# Patient Record
Sex: MALE | Race: WHITE | NOT HISPANIC OR LATINO | URBAN - METROPOLITAN AREA
[De-identification: names, ages, dates, MRNs, and addresses within clinical notes are randomized per-mention and may not be internally consistent; named-entity substitution may affect disease eponyms.]

---

## 2022-02-02 ENCOUNTER — INPATIENT (INPATIENT)
Facility: HOSPITAL | Age: 77
LOS: 15 days | Discharge: NURSING, REHAB OR LONG-TERM CARE | End: 2022-02-18
Attending: HOSPITALIST | Admitting: HOSPITALIST
Payer: MEDICARE

## 2022-02-02 VITALS — HEIGHT: 69 IN

## 2022-02-02 DIAGNOSIS — K92.2 GASTROINTESTINAL HEMORRHAGE, UNSPECIFIED: ICD-10-CM

## 2022-02-02 LAB
ALBUMIN SERPL ELPH-MCNC: 2.7 G/DL — LOW (ref 3.3–5)
ALP SERPL-CCNC: 54 U/L — SIGNIFICANT CHANGE UP (ref 40–120)
ALT FLD-CCNC: 10 U/L — SIGNIFICANT CHANGE UP (ref 4–41)
ANION GAP SERPL CALC-SCNC: 22 MMOL/L — HIGH (ref 7–14)
APTT BLD: 28.6 SEC — SIGNIFICANT CHANGE UP (ref 27–36.3)
AST SERPL-CCNC: 58 U/L — HIGH (ref 4–40)
BASOPHILS # BLD AUTO: 0.02 K/UL — SIGNIFICANT CHANGE UP (ref 0–0.2)
BASOPHILS NFR BLD AUTO: 0.1 % — SIGNIFICANT CHANGE UP (ref 0–2)
BILIRUB SERPL-MCNC: 0.5 MG/DL — SIGNIFICANT CHANGE UP (ref 0.2–1.2)
BLOOD GAS VENOUS COMPREHENSIVE RESULT: SIGNIFICANT CHANGE UP
BUN SERPL-MCNC: 165 MG/DL — HIGH (ref 7–23)
CALCIUM SERPL-MCNC: 8.6 MG/DL — SIGNIFICANT CHANGE UP (ref 8.4–10.5)
CHLORIDE SERPL-SCNC: 91 MMOL/L — LOW (ref 98–107)
CO2 SERPL-SCNC: 15 MMOL/L — LOW (ref 22–31)
CREAT SERPL-MCNC: 3.25 MG/DL — HIGH (ref 0.5–1.3)
EOSINOPHIL # BLD AUTO: 0.02 K/UL — SIGNIFICANT CHANGE UP (ref 0–0.5)
EOSINOPHIL NFR BLD AUTO: 0.1 % — SIGNIFICANT CHANGE UP (ref 0–6)
GLUCOSE BLDC GLUCOMTR-MCNC: 114 MG/DL — HIGH (ref 70–99)
GLUCOSE SERPL-MCNC: 101 MG/DL — HIGH (ref 70–99)
HCT VFR BLD CALC: 16.4 % — CRITICAL LOW (ref 39–50)
HCT VFR BLD CALC: 25.5 % — LOW (ref 39–50)
HGB BLD-MCNC: 5.6 G/DL — CRITICAL LOW (ref 13–17)
HGB BLD-MCNC: 9.1 G/DL — LOW (ref 13–17)
IANC: 9.78 K/UL — HIGH (ref 1.5–8.5)
IMM GRANULOCYTES NFR BLD AUTO: 1.5 % — SIGNIFICANT CHANGE UP (ref 0–1.5)
INR BLD: 1.05 RATIO — SIGNIFICANT CHANGE UP (ref 0.88–1.16)
LYMPHOCYTES # BLD AUTO: 19.1 % — SIGNIFICANT CHANGE UP (ref 13–44)
LYMPHOCYTES # BLD AUTO: 2.62 K/UL — SIGNIFICANT CHANGE UP (ref 1–3.3)
MCHC RBC-ENTMCNC: 29.5 PG — SIGNIFICANT CHANGE UP (ref 27–34)
MCHC RBC-ENTMCNC: 30.1 PG — SIGNIFICANT CHANGE UP (ref 27–34)
MCHC RBC-ENTMCNC: 34.1 GM/DL — SIGNIFICANT CHANGE UP (ref 32–36)
MCHC RBC-ENTMCNC: 35.7 GM/DL — SIGNIFICANT CHANGE UP (ref 32–36)
MCV RBC AUTO: 84.4 FL — SIGNIFICANT CHANGE UP (ref 80–100)
MCV RBC AUTO: 86.3 FL — SIGNIFICANT CHANGE UP (ref 80–100)
MONOCYTES # BLD AUTO: 1.07 K/UL — HIGH (ref 0–0.9)
MONOCYTES NFR BLD AUTO: 7.8 % — SIGNIFICANT CHANGE UP (ref 2–14)
NEUTROPHILS # BLD AUTO: 9.78 K/UL — HIGH (ref 1.8–7.4)
NEUTROPHILS NFR BLD AUTO: 71.4 % — SIGNIFICANT CHANGE UP (ref 43–77)
NRBC # BLD: 0 /100 WBCS — SIGNIFICANT CHANGE UP
NRBC # BLD: 0 /100 WBCS — SIGNIFICANT CHANGE UP
NRBC # FLD: 0 K/UL — SIGNIFICANT CHANGE UP
NRBC # FLD: 0 K/UL — SIGNIFICANT CHANGE UP
PLATELET # BLD AUTO: 101 K/UL — LOW (ref 150–400)
PLATELET # BLD AUTO: 123 K/UL — LOW (ref 150–400)
POTASSIUM SERPL-MCNC: 4.8 MMOL/L — SIGNIFICANT CHANGE UP (ref 3.5–5.3)
POTASSIUM SERPL-SCNC: 4.8 MMOL/L — SIGNIFICANT CHANGE UP (ref 3.5–5.3)
PROT SERPL-MCNC: 4.9 G/DL — LOW (ref 6–8.3)
PROTHROM AB SERPL-ACNC: 12 SEC — SIGNIFICANT CHANGE UP (ref 10.6–13.6)
RBC # BLD: 1.9 M/UL — LOW (ref 4.2–5.8)
RBC # BLD: 3.02 M/UL — LOW (ref 4.2–5.8)
RBC # FLD: 13.8 % — SIGNIFICANT CHANGE UP (ref 10.3–14.5)
RBC # FLD: 14.7 % — HIGH (ref 10.3–14.5)
SARS-COV-2 RNA SPEC QL NAA+PROBE: SIGNIFICANT CHANGE UP
SODIUM SERPL-SCNC: 128 MMOL/L — LOW (ref 135–145)
WBC # BLD: 13.72 K/UL — HIGH (ref 3.8–10.5)
WBC # BLD: 13.92 K/UL — HIGH (ref 3.8–10.5)
WBC # FLD AUTO: 13.72 K/UL — HIGH (ref 3.8–10.5)
WBC # FLD AUTO: 13.92 K/UL — HIGH (ref 3.8–10.5)

## 2022-02-02 PROCEDURE — 99291 CRITICAL CARE FIRST HOUR: CPT

## 2022-02-02 RX ORDER — BUDESONIDE AND FORMOTEROL FUMARATE DIHYDRATE 160; 4.5 UG/1; UG/1
2 AEROSOL RESPIRATORY (INHALATION)
Qty: 0 | Refills: 0 | DISCHARGE

## 2022-02-02 RX ORDER — ATORVASTATIN CALCIUM 80 MG/1
20 TABLET, FILM COATED ORAL ONCE
Refills: 0 | Status: COMPLETED | OUTPATIENT
Start: 2022-02-02 | End: 2022-02-02

## 2022-02-02 RX ORDER — INSULIN LISPRO 100/ML
VIAL (ML) SUBCUTANEOUS
Refills: 0 | Status: DISCONTINUED | OUTPATIENT
Start: 2022-02-02 | End: 2022-02-04

## 2022-02-02 RX ORDER — DEXTROSE 50 % IN WATER 50 %
25 SYRINGE (ML) INTRAVENOUS ONCE
Refills: 0 | Status: DISCONTINUED | OUTPATIENT
Start: 2022-02-02 | End: 2022-02-18

## 2022-02-02 RX ORDER — CEFTRIAXONE 500 MG/1
1000 INJECTION, POWDER, FOR SOLUTION INTRAMUSCULAR; INTRAVENOUS ONCE
Refills: 0 | Status: COMPLETED | OUTPATIENT
Start: 2022-02-02 | End: 2022-02-02

## 2022-02-02 RX ORDER — SENNA PLUS 8.6 MG/1
2 TABLET ORAL
Qty: 0 | Refills: 0 | DISCHARGE

## 2022-02-02 RX ORDER — OMEPRAZOLE 10 MG/1
1 CAPSULE, DELAYED RELEASE ORAL
Qty: 0 | Refills: 0 | DISCHARGE

## 2022-02-02 RX ORDER — ATORVASTATIN CALCIUM 80 MG/1
20 TABLET, FILM COATED ORAL AT BEDTIME
Refills: 0 | Status: DISCONTINUED | OUTPATIENT
Start: 2022-02-03 | End: 2022-02-03

## 2022-02-02 RX ORDER — CEFTRIAXONE 500 MG/1
1000 INJECTION, POWDER, FOR SOLUTION INTRAMUSCULAR; INTRAVENOUS EVERY 24 HOURS
Refills: 0 | Status: DISCONTINUED | OUTPATIENT
Start: 2022-02-03 | End: 2022-02-03

## 2022-02-02 RX ORDER — SODIUM CHLORIDE 9 MG/ML
1000 INJECTION, SOLUTION INTRAVENOUS
Refills: 0 | Status: DISCONTINUED | OUTPATIENT
Start: 2022-02-02 | End: 2022-02-18

## 2022-02-02 RX ORDER — PANTOPRAZOLE SODIUM 20 MG/1
80 TABLET, DELAYED RELEASE ORAL ONCE
Refills: 0 | Status: COMPLETED | OUTPATIENT
Start: 2022-02-02 | End: 2022-02-02

## 2022-02-02 RX ORDER — DEXTROSE 50 % IN WATER 50 %
15 SYRINGE (ML) INTRAVENOUS ONCE
Refills: 0 | Status: DISCONTINUED | OUTPATIENT
Start: 2022-02-02 | End: 2022-02-18

## 2022-02-02 RX ORDER — NOREPINEPHRINE BITARTRATE/D5W 8 MG/250ML
0.05 PLASTIC BAG, INJECTION (ML) INTRAVENOUS
Qty: 8 | Refills: 0 | Status: DISCONTINUED | OUTPATIENT
Start: 2022-02-02 | End: 2022-02-06

## 2022-02-02 RX ORDER — CHOLECALCIFEROL (VITAMIN D3) 125 MCG
1 CAPSULE ORAL
Qty: 0 | Refills: 0 | DISCHARGE

## 2022-02-02 RX ORDER — CEFTRIAXONE 500 MG/1
INJECTION, POWDER, FOR SOLUTION INTRAMUSCULAR; INTRAVENOUS
Refills: 0 | Status: DISCONTINUED | OUTPATIENT
Start: 2022-02-02 | End: 2022-02-03

## 2022-02-02 RX ORDER — CITALOPRAM 10 MG/1
1 TABLET, FILM COATED ORAL
Qty: 0 | Refills: 0 | DISCHARGE

## 2022-02-02 RX ORDER — GLUCAGON INJECTION, SOLUTION 0.5 MG/.1ML
1 INJECTION, SOLUTION SUBCUTANEOUS ONCE
Refills: 0 | Status: DISCONTINUED | OUTPATIENT
Start: 2022-02-02 | End: 2022-02-18

## 2022-02-02 RX ORDER — DESMOPRESSIN ACETATE 0.1 MG/1
30 TABLET ORAL ONCE
Refills: 0 | Status: COMPLETED | OUTPATIENT
Start: 2022-02-02 | End: 2022-02-02

## 2022-02-02 RX ORDER — INSULIN LISPRO 100/ML
VIAL (ML) SUBCUTANEOUS AT BEDTIME
Refills: 0 | Status: DISCONTINUED | OUTPATIENT
Start: 2022-02-02 | End: 2022-02-04

## 2022-02-02 RX ORDER — SODIUM CHLORIDE 9 MG/ML
500 INJECTION, SOLUTION INTRAVENOUS ONCE
Refills: 0 | Status: COMPLETED | OUTPATIENT
Start: 2022-02-02 | End: 2022-02-02

## 2022-02-02 RX ORDER — PANTOPRAZOLE SODIUM 20 MG/1
8 TABLET, DELAYED RELEASE ORAL
Qty: 80 | Refills: 0 | Status: DISCONTINUED | OUTPATIENT
Start: 2022-02-02 | End: 2022-02-04

## 2022-02-02 RX ORDER — SODIUM CHLORIDE 9 MG/ML
1000 INJECTION, SOLUTION INTRAVENOUS
Refills: 0 | Status: DISCONTINUED | OUTPATIENT
Start: 2022-02-02 | End: 2022-02-03

## 2022-02-02 RX ORDER — DEXTROSE 50 % IN WATER 50 %
12.5 SYRINGE (ML) INTRAVENOUS ONCE
Refills: 0 | Status: DISCONTINUED | OUTPATIENT
Start: 2022-02-02 | End: 2022-02-18

## 2022-02-02 RX ORDER — CHLORHEXIDINE GLUCONATE 213 G/1000ML
1 SOLUTION TOPICAL
Refills: 0 | Status: DISCONTINUED | OUTPATIENT
Start: 2022-02-02 | End: 2022-02-11

## 2022-02-02 RX ADMIN — PANTOPRAZOLE SODIUM 10 MG/HR: 20 TABLET, DELAYED RELEASE ORAL at 14:28

## 2022-02-02 RX ADMIN — PANTOPRAZOLE SODIUM 80 MILLIGRAM(S): 20 TABLET, DELAYED RELEASE ORAL at 14:28

## 2022-02-02 RX ADMIN — CEFTRIAXONE 100 MILLIGRAM(S): 500 INJECTION, POWDER, FOR SOLUTION INTRAMUSCULAR; INTRAVENOUS at 23:27

## 2022-02-02 RX ADMIN — DESMOPRESSIN ACETATE 230 MICROGRAM(S): 0.1 TABLET ORAL at 22:05

## 2022-02-02 RX ADMIN — Medication 8.68 MICROGRAM(S)/KG/MIN: at 22:41

## 2022-02-02 RX ADMIN — SODIUM CHLORIDE 1000 MILLILITER(S): 9 INJECTION, SOLUTION INTRAVENOUS at 22:35

## 2022-02-02 NOTE — ED PROVIDER NOTE - PHYSICAL EXAMINATION
General: Elderly male in NAD   HEENT: Normocephalic and atraumatic, conjunctival pallor. Trachea midline.   Cardiac: Normal S1 and S2 w/ RRR. No MRG.  Pulmonary: CTA bilaterally. No increased WOB.   Abdominal: Soft, NTND  Rectal: Dark, melanotic stool in vault.   Neurologic: AAOX1, follows commands.   Musculoskeletal: No limited ROM or deformities  Vascular: Warm and well perfused  Skin: Color appropriate   Psychiatric: Appropriate mood and affect. No apparent risk to self or others.  Sukhdev Del Castillo M.D.

## 2022-02-02 NOTE — H&P ADULT - NSHPLABSRESULTS_GEN_ALL_CORE
9.1    13.92 )-----------( 101      ( 02 Feb 2022 19:54 )             25.5     02-02    128<L>  |  91<L>  |  165<H>  ----------------------------<  101<H>  4.8   |  15<L>  |  3.25<H>    Ca    8.6      02 Feb 2022 14:35    TPro  4.9<L>  /  Alb  2.7<L>  /  TBili  0.5  /  DBili  x   /  AST  58<H>  /  ALT  10  /  AlkPhos  54  02-02      Blood Gas Profile - Venous  pH, Venous - 7.37  pCO2, Venous - 29  pO2, Venous - 32  HCO3, Venous - 17  Lactate, Venous - 4.8 Labs:                9.1    13.92 )-----------( 101      ( 02 Feb 2022 19:54 )             25.5                         8.9L    16.03H )-----------( 122L      ( 03 Feb 2022 02:54 )             25.7L   IANC 11.83H    PT/INR - ( 02 Feb 2022 14:35 )   PT: 12.0 sec;   INR: 1.05 ratio    PTT - ( 03 Feb 2022 02:54 )  PTT:25.2H sec    02-02  128<L>  |  91<L>  |  165<H>  ----------------------------<  101<H>  4.8   |  15<L>  |  3.25<H>  Ca    8.6      02 Feb 2022 14:35    02-03  128<L>  |  92<L>  |  146<H>   ----------------------------<  165<H>  4.5   |  14<L>  |  2.51<H>  Ca    8.3<L>      03 Feb 2022 02:54    TPro  5.4<L>  /  Alb  2.8<L>  /  TBili  2.1<H>  /  DBili  x   /  AST  66<H>  /  ALT  10  /  AlkPhos  65  02-03  TPro  4.9<L>  /  Alb  2.7<L>  /  TBili  0.5  /  DBili  x   /  AST  58<H>  /  ALT  10  /  AlkPhos  54  02-02    Blood Gas Profile - Venous  pH, Venous - 7.37  pCO2, Venous - 29  pO2, Venous - 32  HCO3, Venous - 17  Lactate, Venous - 4.8 Labs:                9.1    13.92 )-----------( 101      ( 2022 19:54 )             25.5                         8.9L    16.03H )-----------( 122L      ( 2022 02:54 )             25.7L   IANC 11.83H    PT/INR - ( 2022 14:35 )   PT: 12.0 sec;   INR: 1.05 ratio    PTT - ( 2022 02:54 )  PTT:25.2H sec    02  128<L>  |  91<L>  |  165<H>  ----------------------------<  101<H>  4.8   |  15<L>  |  3.25<H>  Ca    8.6      2022 14:35    02-  128<L>  |  92<L>  |  146<H>   ----------------------------<  165<H>  4.5   |  14<L>  |  2.51<H>  Ca    8.3<L>      2022 02:54    TPro  5.4<L>  /  Alb  2.8<L>  /  TBili  2.1<H>  /  DBili  x   /  AST  66<H>  /  ALT  10  /  AlkPhos  65  02-03  TPro  4.9<L>  /  Alb  2.7<L>  /  TBili  0.5  /  DBili  x   /  AST  58<H>  /  ALT  10  /  AlkPhos  54  02-02    Blood Gas Profile - Venous  pH, Venous - 7.37  pCO2, Venous - 29  pO2, Venous - 32  HCO3, Venous - 17  Lactate, Venous - 4.8    Urinalysis Basic - ( 2022 05:53 )  Color: Light Yellow / Appearance: Slightly Turbid / S.016 / pH: x  Gluc: x / Ketone: Small  / Bili: Negative / Urobili: <2 mg/dL   Blood: x / Protein: Negative / Nitrite: Negative   Leuk Esterase: Large / RBC: 0 /HPF / WBC 49 /HPF   Sq Epi: x / Non Sq Epi: Moderate / Bacteria: Few

## 2022-02-02 NOTE — H&P ADULT - NSHPREVIEWOFSYSTEMS_GEN_ALL_CORE
CONSTITUTIONAL: No weakness. No fatigue. No fever.  HEAD: No head trauma.   EYES: No vision changes.  ENT: No hearing changes or tinnitus. No ear pain. No changes in smell. No nasal congestion or discharge. No sore throat. No voice hoarseness.   NECK: No neck pain or stiffness. No lumps.  RESPIRATORY: No cough. No SOB. No wheezing. No hemoptysis.   CARDIOVASCULAR: No chest pain. No palpitations.   GASTROINTESTINAL: +ABD pain in RLQ. +Coffee ground emesis. No distension. No constipation. No diarrhea. No pain with defecation. No hematemesis. No hematochezia or melena.  BACK: No back pain.  GENITOURINARY: No dysuria. No frequency or urgency. No hesitancy. No incontinence. No urinary retention. No suprapubic pain. No hematuria.  EXTREMITY: No swelling.  MUSCULOSKELETAL: No joint pain or swelling. No fractures. No stiffness.    SKIN: No rashes. No itching. No skin, hair, or nail changes.  NEUROLOGICAL: No weakness or paralysis. No lightheadedness or dizziness. No HA. No numbness or tingling.   PSYCHIATRIC: No depression. CONSTITUTIONAL: No weakness. No fatigue. No fever.  HEAD: No head trauma.   EYES: No vision changes.  ENT: No hearing changes or tinnitus. No ear pain. No changes in smell. No nasal congestion or discharge. No sore throat. No voice hoarseness.   NECK: No neck pain or stiffness. No lumps.  RESPIRATORY: No cough. No SOB. No wheezing. No hemoptysis.   CARDIOVASCULAR: No chest pain. No palpitations.   GASTROINTESTINAL: +ABD pain in RLQ. +Coffee ground emesis. No distension. No constipation. No diarrhea. No pain with defecation. No hematemesis. No hematochezia or melena.  BACK: No back pain.  GENITOURINARY: No dysuria. No frequency or urgency. No hesitancy. No incontinence. No urinary retention. No suprapubic pain. No hematuria.  EXTREMITY: No swelling.  MUSCULOSKELETAL: No joint pain or swelling. No fractures. No stiffness.    SKIN: No rashes. No itching. No skin, hair, or nail changes.  NEUROLOGICAL: No weakness or paralysis. No lightheadedness or dizziness. No HA. No numbness or tingling.   PSYCHIATRIC: No SI.

## 2022-02-02 NOTE — ED PROVIDER NOTE - CLINICAL SUMMARY MEDICAL DECISION MAKING FREE TEXT BOX
76M presents with GIB, melanoitc stool in vault. Intermittently hypotensive. Given 2U PRBC urgent release. GI, MICU consulted.

## 2022-02-02 NOTE — ED PROVIDER NOTE - ATTENDING CONTRIBUTION TO CARE
DR. BLOCH, ATTENDING MD-  I performed a face to face bedside interview with patient regarding history of present illness, review of symptoms and past medical history. I completed an independent physical exam.  I have discussed patient's plan of care with the fellow.  Upon my evaluation, this patient had a high probability of imminent or life-threatening deterioration due to GI bleed, which required my direct attention, intervention, and personal management.  The patient has a  medical condition that impairs one or more vital organ systems.  Frequent personal assessment and adjustment of medical interventions was performed.      I have personally provided 45 minutes of critical care time exclusive of time spent on separately billable procedures. Time includes review of laboratory data, radiology results, discussion with consultants, patient and family; monitoring for potential decompensation, as well as time spent retrieving data and reviewing the chart and documenting the visit. Interventions were performed as documented above.  Patient mentating well. though hypotensive, oriented x1, pale, heart s1s2, lungs clear abd soft nontedner, extrem trace edema, moving all extremities

## 2022-02-02 NOTE — CONSULT NOTE ADULT - ASSESSMENT
76M with CHF (EF 27% per [employer]scripts in 2011), CAD s/p CABG 2010, CVA with hemiparesis, GERD, MDD, COPD presenting from NH with coffee ground emesis, encephalopathy and hypotension.    Impression:  #Coffee ground emesis - hgb 5.7 concerning for UGIB. Ddx includes stress ulcer, peptic ulcer disease, erosive gastritis, severe reflux esophagitis, MW tear, NSAID gastropathy, Dieulafoy's lesion, angioectasia. No known hx of liver disease.   #CHF - last documented EF in [employer]scripts 27%    Recommendations:  - Keep NPO  - Maintain active type & screen  - Transfuse to maintain Hb >/= 8.0 given hx of cardiac disease  - Pantoprazole 80 mg IV bolus then pantoprazole infusion at 8 mg/hr  - if BP remains low after resuscitation, low threshold for MICU consult  - repeat lactate   - TTE to assess cardiac function  - plan for EGD after resuscitation likely in AM or sooner if needed in ICU setting      Cassandra Trejo PGY-5  Gastroenterology Fellow  Pager #[phone] ([facility]) or [phone] (NS)  Available on [employer] Teams.  Please contact on-call GI fellow via answering service ([phone]) after 5pm and before 8am, and on weekends.

## 2022-02-02 NOTE — H&P ADULT - HISTORY OF PRESENT ILLNESS
76M PMHx CHF (TTE 8/2011 demonstrating moderate segmental LV systolic dysfunction), CAD, HTN, T2DM presents from [facility] with abdominal pain and coffee ground emesis. Patient states that he started experiencing acute onset of R lower quadrant abdominal pain x 1 week, described as a 7/10 intermittent, sharp, non-radiating pain that is made better with consumption of liquid diet. He also endorses 1-2 episodes of pale green and clear emesis daily within this 1-week time frame. Note that per documentation accompanying patient from NH, patient was noted to have coffee ground emesis and altered mentation.    In the ED:  VS: BP /47-66, HR 89-91, RR 17-26, O2 Sat  on 2L, Tmax 97.9  Labs: CBC - WBC 13.72, Hgb 5.6 (baseline is 17.3 in 2015), platelets 123. CMP - Na 128, /3.25 (baseline 1.69 9/2011); AST/ALT 58/10, otherwise normal. VBG 7.37/29/32/17/lactate 4.8.  Imaging: None acquired.    Patient given: Pantoprazole 80, pRBC 2U, ordered for pantoprazole drip and given one additional unit of pRBC. Patient was noted to be intermittently hypotensive despite fluid resuscitation, was admitted to MICU. 76M PMHx CHF (TTE 8/2011 demonstrating moderate segmental LV systolic dysfunction), CAD, COPD, HTN, T2DM presents from [facility] with abdominal pain and coffee ground emesis. Patient states that he started experiencing acute onset of R lower quadrant abdominal pain x 1 week, described as a 7/10 intermittent, sharp, non-radiating pain that is made better with consumption of liquid diet. He also endorses 1-2 episodes of pale green and clear emesis daily within this 1-week time frame. Note that per documentation accompanying patient from NH, patient was being transferred for evaluation of GI bleed and hypotension.    In the ED:  VS: BP /47-66, HR 89-91, RR 17-26, O2 Sat  on 2L, Tmax 97.9  Labs: CBC - WBC 13.72, Hgb 5.6 (baseline is 17.3 in 2015), platelets 123. CMP - Na 128, /3.25 (baseline 1.69 9/2011); AST/ALT 58/10, otherwise normal. VBG 7.37/29/32/17/lactate 4.8.  Imaging: None acquired.    Patient given: Pantoprazole 80, pRBC 2U, ordered for pantoprazole drip and given one additional unit of pRBC. Patient was noted to be intermittently hypotensive despite fluid resuscitation, was admitted to MICU. 76M PMHx CHF (TTE 8/2011 demonstrating moderate segmental LV systolic dysfunction), CAD, COPD, HTN, T2DM presents from [facility] with abdominal pain and coffee ground emesis. Patient states that he started experiencing acute onset of R lower quadrant abdominal pain x 1 week, described as a 7/10 intermittent, sharp, non-radiating pain that is made better with consumption of liquid diet. He also endorses 1-2 episodes of pale green and clear emesis daily within this 1-week time frame. Note that per documentation accompanying patient from NH, patient was being transferred for evaluation of GI bleed and hypotension.    In the ED:  VS: BP /47-66, HR 89-91, RR 17-26, O2 Sat  on 2L, Tmax 97.9  Labs: CBC - WBC 13.72, Hgb 5.6 (baseline is 17.3 in 2015), platelets 123. CMP - Na 128, /3.25 (baseline 1.69 9/2011); AST/ALT 58/10, otherwise normal. VBG 7.37/29/32/17/lactate 4.8.    Patient given: Pantoprazole 80, pRBC 2U, ordered for pantoprazole drip and given one additional unit of pRBC. Patient was noted to be intermittently hypotensive despite fluid resuscitation, was admitted to MICU.

## 2022-02-02 NOTE — ED PROVIDER NOTE - OBJECTIVE STATEMENT
76M PMH CHF, CAD, HTN, DM presents with GIB. As per report from NH, pt had coffee ground emesis last night. This afternoon was noted to be hypotensive so EMS was called. EMS reports pt was hypotensive to 70s in the field. Pt only c/o nausea. unclear what mental baseline is. Does not appear to be on thinners. 76M PMH CHF, CAD, HTN, DM presents with GIB. As per report from NH, pt had coffee ground emesis last night. This afternoon was noted to be hypotensive so EMS was called. EMS reports pt was hypotensive to 70s in the field. Pt only c/o nausea. unclear what mental baseline is. Does not appear to be on blood thinners.

## 2022-02-02 NOTE — CONSULT NOTE ADULT - SUBJECTIVE AND OBJECTIVE BOX
Chief Complaint:  Patient is a 76y old  Male who presents with a chief complaint of     HPI: History mostly obtained from NH paperwork and chart review.    76M with CHF (EF 27% per [employer]scripts in 2011), CAD s/p CABG 2010, CVA with hemiparesis, GERD, MDD, COPD presenting from NH with coffee ground emesis last night along with AMS. Per documentation, patient normally AOx2-3 however now confused and slow to respond. Noted to be hypotensive by EMS to 70s systolic. Patient states he has discomfort in his back currently. Able to state he is at [facility] and here for a stomach problem. Denies history of stomach problems, n/v, dizziness.      Allergies:  No Known Allergies      Home Medications:    Hospital Medications:  pantoprazole Infusion 8 mG/Hr IV Continuous <Continuous>      PMHX/PSHX:  Cigarette Smoker    Essential Hypertension    CHF (Congestive Heart Failure)    Diabetes Mellitus    Coronary Artery Disease (CAD) Excluded    CAD (Coronary Atherosclerotic Disease)    Hypercholesteremia    CABG (Coronary Artery Bypass Graft)    History of Appendectomy    CABG (Coronary Artery Bypass Graft)        Family history:      Social History:     ROS:     General:  No weight loss, fevers, chills, night sweats, fatigue  Eyes:  No vision changes, no yellowing of eyes   ENT:  No throat pain, runny nose  CV:  No chest pain, palpitations  Resp:  No SOB, cough, wheezing  GI:  See HPI  :  No burning with urination, no hematuria   Muscle:  No muscle pain, weakness  Neuro:  No numbness/tingling, memory problems  Psych:  No fatigue, insomnia, mood problems  Heme:  No easy bruisability  Skin:  No rash, itching       PHYSICAL EXAM:     GENERAL: pale, no distress lying in stretcher  HEENT:  NC/AT,  conjunctivae clear and pink, dried brown material on mouth  CHEST:  Full & symmetric excursion, no increased effort  HEART:  Regular rate  ABDOMEN:  Soft, non-tender, non-distended  RECTAL: dark brown stool  EXTREMITIES:  no cyanosis, clubbing or edema  SKIN:  No rash/erythema/ecchymoses/petechiae/wounds/abscess/warm/dry  NEURO:  Alert, oriented to place    Vital Signs:  Vital Signs Last 24 Hrs  T(C): 36.6 (02 Feb 2022 15:13), Max: 36.6 (02 Feb 2022 14:38)  T(F): 97.9 (02 Feb 2022 15:13), Max: 97.9 (02 Feb 2022 14:38)  HR: 90 (02 Feb 2022 15:13) (89 - 91)  BP: 106/53 (02 Feb 2022 15:13) (83/47 - 106/53)  BP(mean): 72 (02 Feb 2022 15:00) (72 - 72)  RR: 18 (02 Feb 2022 15:13) (17 - 26)  SpO2: 100% (02 Feb 2022 15:13) (99% - 100%)  Daily Height in cm: 175.26 (02 Feb 2022 13:42)    Daily     LABS:                        5.6    13.72 )-----------( 123      ( 02 Feb 2022 14:35 )             16.4             PT/INR - ( 02 Feb 2022 14:35 )   PT: 12.0 sec;   INR: 1.05 ratio         PTT - ( 02 Feb 2022 14:35 )  PTT:28.6 sec        Imaging:           HPI: History mostly obtained from NH paperwork and chart review.    76M with CHF (EF 27% per [employer]scripts in 2011), CAD s/p CABG 2010, CVA with hemiparesis, GERD, MDD, COPD presenting from NH with coffee ground emesis last night along with AMS. Per documentation, patient normally AOx2-3 however now confused and slow to respond. Noted to be hypotensive by EMS to 70s systolic. Patient states he has discomfort in his back currently. Able to state he is at [facility] and here for a stomach problem. Denies history of stomach problems, n/v, dizziness.      Allergies:  No Known Allergies      Home Medications:    Hospital Medications:  pantoprazole Infusion 8 mG/Hr IV Continuous <Continuous>      PMHX/PSHX:  Cigarette Smoker    Essential Hypertension    CHF (Congestive Heart Failure)    Diabetes Mellitus    Coronary Artery Disease (CAD) Excluded    CAD (Coronary Atherosclerotic Disease)    Hypercholesteremia    CABG (Coronary Artery Bypass Graft)    History of Appendectomy    CABG (Coronary Artery Bypass Graft)        Family history:      Social History:     ROS:     General:  No weight loss, fevers, chills, night sweats, fatigue  Eyes:  No vision changes, no yellowing of eyes   ENT:  No throat pain, runny nose  CV:  No chest pain, palpitations  Resp:  No SOB, cough, wheezing  GI:  See HPI  :  No burning with urination, no hematuria   Muscle:  No muscle pain, weakness  Neuro:  No numbness/tingling, memory problems  Psych:  No fatigue, insomnia, mood problems  Heme:  No easy bruisability  Skin:  No rash, itching       PHYSICAL EXAM:     GENERAL: pale, no distress lying in stretcher  HEENT:  NC/AT,  conjunctivae clear and pink, dried brown material on mouth  CHEST:  Full & symmetric excursion, no increased effort  HEART:  Regular rate  ABDOMEN:  Soft, non-tender, non-distended  RECTAL: dark brown stool  EXTREMITIES:  no cyanosis, clubbing or edema  SKIN:  No rash/erythema/ecchymoses/petechiae/wounds/abscess/warm/dry  NEURO:  Alert, oriented to place    Vital Signs:  Vital Signs Last 24 Hrs  T(C): 36.6 (02 Feb 2022 15:13), Max: 36.6 (02 Feb 2022 14:38)  T(F): 97.9 (02 Feb 2022 15:13), Max: 97.9 (02 Feb 2022 14:38)  HR: 90 (02 Feb 2022 15:13) (89 - 91)  BP: 106/53 (02 Feb 2022 15:13) (83/47 - 106/53)  BP(mean): 72 (02 Feb 2022 15:00) (72 - 72)  RR: 18 (02 Feb 2022 15:13) (17 - 26)  SpO2: 100% (02 Feb 2022 15:13) (99% - 100%)  Daily Height in cm: 175.26 (02 Feb 2022 13:42)    Daily     LABS:                        5.6    13.72 )-----------( 123      ( 02 Feb 2022 14:35 )             16.4             PT/INR - ( 02 Feb 2022 14:35 )   PT: 12.0 sec;   INR: 1.05 ratio         PTT - ( 02 Feb 2022 14:35 )  PTT:28.6 sec        Imaging:

## 2022-02-02 NOTE — H&P ADULT - NSHPPHYSICALEXAM_GEN_ALL_CORE
Vital Signs Last 24 Hrs  T(C): 36.4 (02 Feb 2022 20:50), Max: 36.7 (02 Feb 2022 16:30)  T(F): 97.5 (02 Feb 2022 20:50), Max: 98 (02 Feb 2022 16:30)  HR: 92 (02 Feb 2022 21:45) (84 - 100)  BP: 81/33 (02 Feb 2022 21:45) (81/33 - 115/78)  BP(mean): 72 (02 Feb 2022 15:00) (72 - 72)  RR: 16 (02 Feb 2022 21:45) (14 - 26)  SpO2: 98% (02 Feb 2022 21:45) (98% - 100%)    CONSTITUTIONAL: No acute distress. Awake and alert.  RESPIRATORY: CTAB. No wheezes, rales, or rhonchi. No accessory muscle use. No apparent respiratory distress.  CARDIOVASCULAR: +S1/S2. No audible S3/S4. Regular rate and rhythm. No murmurs, rubs, or gallops.   GASTROINTESTINAL: Soft, nontender, nondistended. +BS. No rebound or guarding.   MUSCULOSKELETAL: Spontaneous movement in all extremities.  DERMATOLOGICAL: No abnormal rashes or lesions.  NEUROLOGICAL: No focal deficits. A&Ox3 (oriented to person, place, and time). ICU Vital Signs Last 24 Hrs  T(C): 35.8 (03 Feb 2022 02:23), Max: 36.7 (02 Feb 2022 16:30)  T(F): 96.4 (03 Feb 2022 02:23), Max: 98 (02 Feb 2022 16:30)  HR: 90 (03 Feb 2022 03:00) (84 - 100)  BP: 131/90 (03 Feb 2022 03:00) (53/32 - 131/90)  BP(mean): 100 (03 Feb 2022 03:00) (40 - 100)  RR: 20 (03 Feb 2022 03:00) (13 - 26)  SpO2: 100% (03 Feb 2022 03:00) (98% - 100%)    CONSTITUTIONAL: No acute distress. Awake and alert.  RESPIRATORY: +NC. CTAB. No accessory muscle use. No apparent respiratory distress.  CARDIOVASCULAR: +S1/S2. No audible S3/S4. Regular rate and rhythm. No murmurs, rubs, or gallops.   GASTROINTESTINAL: Soft, nontender, nondistended. +BS. No rebound or guarding.   MUSCULOSKELETAL: Spontaneous movement in all extremities.  DERMATOLOGICAL: No abnormal rashes or lesions.  NEUROLOGICAL: No focal deficits. Alert. +Oriented x1-2. ICU Vital Signs Last 24 Hrs  T(C): 35.8 (03 Feb 2022 02:23), Max: 36.7 (02 Feb 2022 16:30)  T(F): 96.4 (03 Feb 2022 02:23), Max: 98 (02 Feb 2022 16:30)  HR: 90 (03 Feb 2022 03:00) (84 - 100)  BP: 131/90 (03 Feb 2022 03:00) (53/32 - 131/90)  BP(mean): 100 (03 Feb 2022 03:00) (40 - 100)  RR: 20 (03 Feb 2022 03:00) (13 - 26)  SpO2: 100% (03 Feb 2022 03:00) (98% - 100%)    Gen: NAD  HEENT: NCAT, no conjunctival injection, no scleral icterus, +dry oral mucosa  Neck: no JVD, supple, no LAD, no thyromegaly  Resp: +LFNC, normal WOB at rest, CTAB anteriorly  CV: RRR, S1 and S2, no murmurs, no rubs, no gallops, 2+ radial pulses  Abd: nondistended, bowel sounds, soft, nontender, no rebound, no involuntary guarding, no organomegaly  : +Hung  Ext: no lower extremity edema  Neuro: alert, oriented x4 ICU Vital Signs Last 24 Hrs  T(C): 35.8 (03 Feb 2022 02:23), Max: 36.7 (02 Feb 2022 16:30)  T(F): 96.4 (03 Feb 2022 02:23), Max: 98 (02 Feb 2022 16:30)  HR: 90 (03 Feb 2022 03:00) (84 - 100)  BP: 131/90 (03 Feb 2022 03:00) (53/32 - 131/90)  BP(mean): 100 (03 Feb 2022 03:00) (40 - 100)  RR: 20 (03 Feb 2022 03:00) (13 - 26)  SpO2: 100% (03 Feb 2022 03:00) (98% - 100%)    Gen: NAD, +tired-appearing  HEENT: NCAT, no conjunctival injection, no scleral icterus, +dry oral mucosa  Neck: no JVD, supple, no LAD, no thyromegaly  Resp: +LFNC, normal WOB at rest, CTAB anteriorly  CV: RRR, S1 and S2, no murmurs, no rubs, no gallops, 2+ radial pulses  Abd: nondistended, bowel sounds, soft, nontender, no rebound, no involuntary guarding, no organomegaly  : +Hung  Ext: no lower extremity edema  Neuro: alert, oriented x4 ICU Vital Signs Last 24 Hrs  T(C): 35.8 (03 Feb 2022 02:23), Max: 36.7 (02 Feb 2022 16:30)  T(F): 96.4 (03 Feb 2022 02:23), Max: 98 (02 Feb 2022 16:30)  HR: 90 (03 Feb 2022 03:00) (84 - 100)  BP: 131/90 (03 Feb 2022 03:00) (53/32 - 131/90)  BP(mean): 100 (03 Feb 2022 03:00) (40 - 100)  RR: 20 (03 Feb 2022 03:00) (13 - 26)  SpO2: 100% (03 Feb 2022 03:00) (98% - 100%)    Gen: NAD, +tired-appearing  HEENT: NCAT, no conjunctival injection, no scleral icterus, +dry oral mucosa  Neck: no JVD, supple, no LAD, no thyromegaly  Resp: +LFNC, normal WOB at rest, CTAB anteriorly  CV: RRR, S1 and S2, no murmurs, no rubs, no gallops, 2+ radial pulses  Abd: nondistended, bowel sounds, soft, nontender, no rebound, no involuntary guarding, no organomegaly  : +Hung  Ext: no lower extremity edema  Neuro: alert, oriented x4  Access: 2 PIV 18g, 1 arrow LUE ICU Vital Signs Last 24 Hrs  T(C): 35.8 (03 Feb 2022 02:23), Max: 36.7 (02 Feb 2022 16:30)  T(F): 96.4 (03 Feb 2022 02:23), Max: 98 (02 Feb 2022 16:30)  HR: 90 (03 Feb 2022 03:00) (84 - 100)  BP: 131/90 (03 Feb 2022 03:00) (53/32 - 131/90)  BP(mean): 100 (03 Feb 2022 03:00) (40 - 100)  RR: 20 (03 Feb 2022 03:00) (13 - 26)  SpO2: 100% (03 Feb 2022 03:00) (98% - 100%)    Gen: NAD, +tired-appearing  HEENT: NCAT, no conjunctival injection, no scleral icterus, +dry oral mucosa  Neck: no JVD, supple, no LAD, no thyromegaly  Resp: +LFNC, normal WOB at rest, CTAB anteriorly  CV: RRR, S1 and S2, no murmurs, no rubs, no gallops, 2+ radial pulses  Abd: nondistended, bowel sounds, soft, nontender, no rebound, no involuntary guarding, no organomegaly  Ext: no lower extremity edema  Neuro: alert, oriented x4  Access: 2 PIV 18g, 1 arrow LUE

## 2022-02-02 NOTE — ED PROVIDER NOTE - NSICDXPASTMEDICALHX_GEN_ALL_CORE_FT
PAST MEDICAL HISTORY:  CABG (Coronary Artery Bypass Graft) Dec 2010    CAD (Coronary Atherosclerotic Disease)     CHF (Congestive Heart Failure)     Cigarette Smoker     Diabetes Mellitus     Essential Hypertension     Hypercholesteremia

## 2022-02-02 NOTE — CONSULT NOTE ADULT - ATTENDING COMMENTS
#Coffee ground emesis: Reported per NH, no recurrent overt bleeding since arrival in ED  #Anemia: Unknown baseline, here with Hgb 5.7  #Heart failure #Coffee ground emesis: Reported per NH, no recurrent overt bleeding since arrival in ED  #Anemia: Unknown baseline, here with Hgb 5.7  #Heart failure    --PPI drip  --NPO for now  --Follow up post-transfusion CBC (receiving 2U pRBC in ED)  --Trend lactate after resuscitation, currently 4.8  --EGD pending resuscitation, likely tomorrow, but can reassess for more urgent endoscopic evaluation pending clinical course  --TTE given history of HF and no recent echo in system  --Low threshold for ICU consultation #Coffee ground emesis: Reported per NH, no recurrent overt bleeding since arrival in ED. Hypotensive in field, improved in ED with initial transfusion.  #Anemia: Unknown baseline, here with Hgb 5.7  #Heart failure    --PPI drip  --NPO for now  --Follow up post-transfusion CBC (receiving 2U pRBC in ED)  --Trend lactate after resuscitation, currently 4.8  --EGD pending resuscitation, likely tomorrow, but can reassess for more urgent endoscopic evaluation pending clinical course  --TTE given history of HF and no recent echo in system  --Low threshold for ICU consultation #Coffee ground emesis: Reported per NH, no recurrent overt bleeding since arrival in ED. Hypotensive in field, improved in ED with initial transfusion.  #Anemia: Unknown baseline, here with Hgb 5.7  #Heart failure    --PPI drip  --NPO for now  --Follow up post-transfusion CBC (receiving 2U pRBC in ED)  --Trend lactate after resuscitation, currently 4.8  --EGD pending resuscitation, likely tomorrow, but can reassess for more urgent endoscopic evaluation pending clinical course  --TTE given history of HF and no recent echo in system  --Low threshold for ICU consultation (case already discussed with MICU team and assessment pending)

## 2022-02-02 NOTE — H&P ADULT - ASSESSMENT
76M PMHx CHF (TTE 8/2011 demonstrating moderate segmental LV systolic dysfunction), CAD, HTN, T2DM presents from [facility] with abdominal pain and coffee ground emesis; on presentation, found to be hypotensive and anemic to Hgb 5.4 concerning for hemorrhagic shock due to upper GI bleed. 76M PMHx CHF (TTE 8/2011 demonstrating moderate segmental LV systolic dysfunction), CAD, HTN, T2DM presents from [facility] with abdominal pain and coffee ground emesis; on presentation, found to be hypotensive and anemic to Hgb 5.4 concerning for hemorrhagic shock due to upper GI bleed.    #Neuro    #Respiratory    #Cardiovascular    #GI    #/Renal    #Endocrine    #ID    #Heme/Onc    #DVT Prophylaxis    #GOC   76M PMHx CHF (TTE 8/2011 demonstrating moderate segmental LV systolic dysfunction), CAD, HTN, T2DM presents from [facility] with abdominal pain and coffee ground emesis; on presentation, found to be hypotensive and anemic to Hgb 5.4 concerning for hemorrhagic shock due to upper GI bleed.    NEURO/PSYCH    #    SKIN/MSK    #    GI/NUTRITION    #GIB  - pantoprazole infusion    #Diet: NPO    ENDO    #    NEPHRO//METABOLIC/VOLUME    #    HEME/ONC    #VTE ppx:     CV    #Shock  Likely hypovolemic.  - norepi  - f/u lactate 2/3 AM    #CAD, h/o  - TTE    PULM    #    ETHICS    - full code    ID    #    LINES    - 76M PMHx CHF (TTE 8/2011 demonstrating moderate segmental LV systolic dysfunction), CAD, HTN, T2DM presents from [facility] with abdominal pain and coffee ground emesis; on presentation, found to be hypotensive and anemic to Hgb 5.4 concerning for hemorrhagic shock due to upper GI bleed.    NEURO/PSYCH    #    SKIN/MSK    #    GI/NUTRITION    #GIB  - pantoprazole infusion    #Diet: NPO    ENDO    #DMT2  - ISS    NEPHRO//METABOLIC/VOLUME    #    HEME/ONC    #Anemia  Likely 2/2 GIB.  - s/p pRBCs    #VTE ppx: SCDs    CV    #Shock  Likely hypovolemic/hemorrhagic 2/2 GIB.  - norepi  - f/u lactate 2/3 AM    #CAD and HTN  - TTE    PULM    #    ETHICS    - full code    ID    #    LINES    - 76M PMHx CHF (TTE 8/2011 demonstrating moderate segmental LV systolic dysfunction), CAD, HTN, T2DM presents from [facility] with abdominal pain and coffee ground emesis; on presentation, found to be hypotensive and anemic to Hgb 5.4 concerning for hemorrhagic shock due to upper GI bleed.    NEURO/PSYCH    - holding home citalopram    SKIN/MSK    GI/NUTRITION    #Coffee ground emesis  Likely UGIB 2/2 stress ulcer, PUD, erosive gastritis, reflux esophagitis, MW tear, NSAID gastropathy, etc.  - pantoprazole infusion s/p 80mg IVB  - EGD planned for 2/3 AM    #GERD  - holding home omeprazole    #Constipation  - holding home senna    #Diet: NPO    ENDO    #DMT2  - ISS  - holding home metformin    NEPHRO//METABOLIC/VOLUME    HEME/ONC    #Anemia  Likely 2/2 UGIB.  - Hb goal >=8  - s/p pRBCs  - CBC Q6H     #VTE ppx: SCDs    CV    #Shock  Likely hypovolemic/hemorrhagic 2/2 GIB.  - norepi  - s/p IVF  - f/u lactate 2/3 AM  - holding home carvedilol    #HF and CAD  Last documented LVEF in [employer] was 27%.  - TTE  - home statin    PULM    - holding home Symbicort    ETHICS    - full code    ID 76M PMHx CHF (TTE 8/2011 demonstrating moderate segmental LV systolic dysfunction), CAD, HTN, T2DM presents from [facility] with abdominal pain and coffee ground emesis; on presentation, found to be hypotensive and anemic to Hgb 5.4 concerning for hemorrhagic shock due to upper GI bleed.    NEURO/PSYCH    - holding home citalopram    SKIN/MSK    GI/NUTRITION    #Coffee ground emesis  Likely UGIB 2/2 stress ulcer, PUD, erosive gastritis, reflux esophagitis, MW tear, NSAID gastropathy, etc.  - pantoprazole infusion s/p 80mg IVB  - EGD planned for 2/3 AM    #GERD  - holding home omeprazole    #Constipation  - holding home senna    #Diet: NPO    ENDO    #DMT2  - ISS  - holding home metformin    NEPHRO//METABOLIC/VOLUME    HEME/ONC    #Anemia  Likely 2/2 UGIB.  - Hb goal >=8  - s/p pRBCs  - CBC Q6H     #VTE ppx: SCDs    CV    #Shock  Possibly 2/2 GIB, infection, or adrenal insufficiency.  - norepi  - s/p IVF  - f/u lactate 2/3 AM  - f/u cortisol 2/3 AM  - empiric CTX  - holding home carvedilol    #HF and CAD  Last documented LVEF in [employer] was 27%.  - TTE  - home statin    PULM    - holding home Symbicort    ETHICS    - full code    ID 76M PMHx CHF (TTE 8/2011 demonstrating moderate segmental LV systolic dysfunction), CAD, HTN, T2DM presents from [facility] with abdominal pain and coffee ground emesis; on presentation, found to be hypotensive and anemic to Hgb 5.4 concerning for hemorrhagic shock due to upper GI bleed.    NEURO/PSYCH    - holding home citalopram    SKIN/MSK    - ZOE    GI/NUTRITION    #Coffee ground emesis  Likely UGIB 2/2 stress ulcer, PUD, erosive gastritis, reflux esophagitis, MW tear, NSAID gastropathy, etc.  - pantoprazole infusion s/p 80mg IVP  - EGD planned for 2/3 AM    #GERD  - holding home omeprazole    #Constipation  - holding home senna    #Diet: NPO    ENDO    #DMT2  - ISS  - holding home metformin    NEPHRO//METABOLIC/VOLUME    HEME/ONC    #Anemia  Likely 2/2 UGIB.  - Hb goal >=8  - s/p pRBCs  - CBC Q6H  - maintain active T&S    #VTE ppx: SCDs    CV    #Shock  Possibly 2/2 GIB, infection, or adrenal insufficiency.  - norepi  - s/p IVF  - f/u lactate, cortisol, and procal 2/3 AM  - empiric CTX  - holding home carvedilol    #HF and CAD  Last documented LVEF in Allscripts was 27%.  - TTE  - home statin    PULM    - holding home Symbicort    ETHICS    - full code    ID 76M PMHx CHF (TTE 8/2011 demonstrating moderate segmental LV systolic dysfunction), CAD, HTN, T2DM presents from [facility] with abdominal pain and coffee ground emesis; on presentation, found to be hypotensive and anemic to Hgb 5.4 concerning for hemorrhagic shock due to upper GI bleed.    NEURO/PSYCH    - holding home citalopram    SKIN/MSK    GI/NUTRITION    #Coffee ground emesis  Likely UGIB 2/2 stress ulcer, PUD, erosive gastritis, reflux esophagitis, MW tear, NSAID gastropathy, etc.  - pantoprazole infusion s/p 80mg IVP  - EGD planned for 2/3 AM  - f/u U/S abd    #GERD  - holding home omeprazole    #Constipation  - holding home senna    #Diet: NPO    ENDO    #DMT2  - ISS  - holding home metformin    NEPHRO//METABOLIC/VOLUME    #Hung    HEME/ONC    #Anemia  Likely 2/2 UGIB.  - Hb goal >=8  - s/p pRBCs  - CBC Q6H  - maintain active T&S    #VTE ppx: SCDs    CV    #Shock  Possibly 2/2 infection, UGIB, or adrenal insufficiency.  - norepi  - s/p IVF  - f/u lactate, cortisol, TSH, T4, and procal 2/3 AM  - empiric Zosyn  - holding home carvedilol    #HF and CAD  Last documented LVEF in Allscripts was 27%.  - TTE  - holding home statin    PULM    - Duoneb instead of home Symbicort    ETHICS    - full code    ID    #Neutrophilic leukocytosis  Likely 2/2 infection.  - s/p IVF  - f/u lactate and procal 2/3 AM  - empiric Zosyn  - f/u U/S abd 76M with PMHx HFrEF (TTE 8/2011 showed moderate segmental LVSD), CAD s/p CABG, HTN, DM.  Presented from [facility] with abd pain and coffee ground emesis. Found to be hypotensive, anemic to Hb 5.4, with DUSTIN, and with c/f UGIB.    NEURO/PSYCH    - holding home citalopram    SKIN/MSK    GI/NUTRITION    #Coffee ground emesis  Likely UGIB. BUN 165H.  - pantoprazole infusion s/p 80mg IVP  - EGD planned for 2/3 AM    #Abd pain  Likely 2/2 UGIB. POCUS showed cholelithiasis.  - f/u U/S abd and CT A/P w/o con    #GERD  - holding home omeprazole    #Constipation  - holding home senna    #Diet: NPO    ENDO    #DM  - ISS  - holding home metformin    NEPHRO//METABOLIC/VOLUME    #DUSTIN  Likely prerenal. BUN 165H.  - Hung  - s/p IVF    #HypoNa  - f/u cortisol, TSH, and T4 2/3 AM  - s/p IVF    HEME/ONC    #Anemia  Likely 2/2 UGIB.  - Hb goal >=8  - s/p pRBCs  - CBC Q6H  - maintain active T&S    #Thrombocytopenia  Likely reactive.  - CBC Q6H    #VTE ppx: SCDs    CV    #Shock  Possibly 2/2 infection, UGIB, or adrenal insufficiency.  - norepi  - s/p IVF  - f/u cortisol and procal 2/3 AM  - f/u lactate 2/4 AM  - empiric Zosyn  - holding home carvedilol    #HF and CAD  Last documented LVEF in Allscripts was 27%.  - TTE  - holding home statin    PULM    - Duoneb PRN instead of home Symbicort    ETHICS    - full code    ID    #Neutrophilic leukocytosis  Likely 2/2 infection.  - s/p IVF  - f/u procal 2/3 AM  - f/u lactate 2/4 AM  - empiric Zosyn  - f/u ucx and bcx  - f/u U/S abd and CT A/P w/o con 76M with PMHx HFrEF (TTE 8/2011 showed moderate segmental LVSD), CAD s/p CABG, HTN, DM.  Presented from [facility] with abd pain and coffee ground emesis. Found to be hypotensive, anemic to Hb 5.4, with DUSTIN, and with c/f UGIB.    NEURO/PSYCH    - holding home citalopram    SKIN/MSK    GI/NUTRITION    #Coffee ground emesis  Likely UGIB. BUN 165H.  - pantoprazole infusion s/p 80mg IVP  - EGD planned for 2/3 AM    #Abd pain  Likely 2/2 UGIB. POCUS showed cholelithiasis.  - f/u U/S abd and CT A/P w/o con    #GERD  - holding home omeprazole    #Constipation  - holding home senna    #Diet: NPO    ENDO    #DM  - ISS  - holding home metformin    NEPHRO//METABOLIC/VOLUME    #DUSTIN  Likely prerenal. BUN 165H.  - Hung  - s/p IVF    #HypoNa  - f/u cortisol, TSH, and T4 2/3 AM  - s/p IVF    HEME/ONC    #Anemia  Likely 2/2 UGIB.  - Hb goal >=8  - s/p pRBCs  - CBC Q6H  - maintain active T&S    #Thrombocytopenia  Likely reactive.  - CBC Q6H    #VTE ppx: SCDs    CV    #Shock  Possibly 2/2 infection, UGIB, or adrenal insufficiency.  - norepi  - s/p IVF  - f/u cortisol, procal, and HsenTnT 2/3 AM  - f/u lactate 2/4 AM  - empiric Zosyn  - holding home carvedilol    #HF and CAD  Last documented LVEF in Allscripts was 27%.  - TTE  - holding home statin  - f/u HsenTnT 2/3 AM    PULM    - Duoneb PRN instead of home Symbicort  - wean off LFNC    ETHICS    - full code    ID    #Neutrophilic leukocytosis  Likely 2/2 infection.  - s/p IVF  - f/u procal 2/3 AM  - f/u lactate 2/4 AM  - empiric Zosyn  - f/u ucx and bcx  - f/u U/S abd and CT A/P w/o con 76M with PMHx HFrEF (TTE 8/2011 showed moderate segmental LVSD), CAD s/p CABG, HTN, DM.  Presented from [facility] with abd pain and coffee ground emesis. Found to be hypotensive, anemic to Hb 5.4, with DUSTIN, and with c/f UGIB.    NEURO/PSYCH    - holding home citalopram    SKIN/MSK    GI/NUTRITION    #Coffee ground emesis  Likely UGIB. BUN 165H.  - pantoprazole infusion s/p 80mg IVP  - EGD planned for 2/3 AM    #Abd pain  Likely 2/2 UGIB. POCUS showed cholelithiasis.  - f/u U/S abd and CT A/P w/o con    #GERD  - holding home omeprazole    #Constipation  - holding home senna    #Diet: NPO    ENDO    #DM  - ISS  - holding home metformin    NEPHRO//METABOLIC/VOLUME    #DUSTIN  Likely prerenal. BUN 165H.  - Hung  - s/p IVF    #HypoNa  - f/u cortisol, TSH, and T4 2/3 AM  - s/p IVF    HEME/ONC    #Anemia  Likely 2/2 UGIB.  - Hb goal >=8  - s/p pRBCs  - CBC Q6H  - maintain active T&S    #Thrombocytopenia  Likely reactive.  - CBC Q6H    #VTE ppx: SCDs    CV    #Shock  Possibly 2/2 infection, UGIB, or adrenal insufficiency. Less likely cardiogenic shock.  - norepi  - s/p IVF  - f/u cortisol, procal, TSH, T4, and HsenTnT 2/3 AM  - f/u lactate 2/4 AM  - empiric Zosyn  - holding home carvedilol    #HF and CAD  Last documented LVEF in Allscripts was 27%.  - TTE  - holding home statin  - f/u HsenTnT 2/3 AM    PULM    - Duoneb PRN instead of home Symbicort  - wean off LFNC    ETHICS    - full code    ID    #Neutrophilic leukocytosis  Likely 2/2 infection.  - s/p IVF  - f/u procal 2/3 AM  - f/u lactate 2/4 AM  - empiric Zosyn  - f/u ucx and bcx  - f/u U/S abd and CT A/P w/o con 76M with PMHx HFrEF (TTE 8/2011 showed moderate segmental LVSD), CAD s/p CABG, HTN, DM.  Presented from [facility] with abd pain and coffee ground emesis. Found to be hypotensive, anemic to Hb 5.4, with DUSTIN, and with c/f UGIB.    NEURO/PSYCH    - holding home citalopram    SKIN/MSK    GI/NUTRITION    #Coffee ground emesis  Likely UGIB. BUN 165H.  - pantoprazole infusion s/p 80mg IVP  - EGD planned for 2/3 AM    #Abd pain  Likely 2/2 UGIB. POCUS showed cholelithiasis.  - f/u U/S abd and CT A/P w/o con    #GERD  - holding home omeprazole    #Constipation  - holding home senna    #Diet: NPO    ENDO    #DM  - ISS  - holding home metformin    NEPHRO//METABOLIC/VOLUME    #DUSTIN  Likely prerenal. BUN 165H.  - Hung  - s/p IVF    #HypoNa  Normal TSH and fT4.  - f/u cortisol 2/3 AM  - s/p IVF    HEME/ONC    #Anemia  Likely 2/2 UGIB.  - Hb goal >=8  - s/p pRBCs  - CBC Q6H  - maintain active T&S    #Thrombocytopenia  Likely reactive.  - CBC Q6H    #VTE ppx: SCDs    CV    #Shock  Possibly 2/2 infection, UGIB, or adrenal insufficiency. Less likely cardiogenic shock. Normal TSH and fT4.  - norepi  - s/p IVF  - f/u cortisol and HsenTnT 2/3 AM  - f/u lactate 2/4 AM  - empiric Zosyn  - holding home carvedilol    #HF and CAD  Last documented LVEF in Allscripts was 27%.  - TTE  - holding home statin  - f/u HsenTnT 2/3 AM    PULM    - Duoneb PRN instead of home Symbicort  - wean off LFNC    ETHICS    - full code    ID    #Neutrophilic leukocytosis  Likely 2/2 infection. Procal 3.21H.  - s/p IVF  - f/u lactate 2/4 AM  - empiric Zosyn  - f/u ucx and bcx  - f/u U/S abd and CT A/P w/o con 76M with PMHx HFrEF (TTE 8/2011 showed moderate segmental LVSD), CAD s/p CABG, HTN, DM.  Presented from [facility] with abd pain and coffee ground emesis. Found to be hypotensive, anemic to Hb 5.4, with DUSTIN, and with c/f UGIB.    NEURO/PSYCH    - holding home citalopram    SKIN/MSK    GI/NUTRITION    #Coffee ground emesis  Likely UGIB. BUN 165H.  - pantoprazole infusion s/p 80mg IVP  - EGD planned for 2/3 AM    #Abd pain  Likely 2/2 UGIB. POCUS showed cholelithiasis.  - f/u U/S abd and CT A/P w/o con    #GERD  - holding home omeprazole    #Constipation  - holding home senna    #Diet: NPO    ENDO    #DM  - ISS  - holding home metformin    NEPHRO//METABOLIC/VOLUME    #DUSTIN  Likely prerenal. BUN 165H.  - Hung  - s/p IVF    #HypoNa  Normal TSH and fT4.  - f/u cortisol 2/3 AM  - s/p IVF    HEME/ONC    #Anemia  Likely 2/2 UGIB.  - Hb goal >=8  - s/p pRBCs  - CBC Q6H  - maintain active T&S    #Thrombocytopenia  Likely reactive.  - CBC Q6H    #VTE ppx: SCDs    CV    #Shock  Possibly 2/2 infection, UGIB, adrenal insufficiency, cardiogenic shock. Normal TSH and fT4.  - norepi  - s/p IVF  - f/u cortisol and HsenTnT 2/3 AM  - f/u lactate 2/4 AM  - empiric Zosyn  - holding home carvedilol    #HFrEF and CAD  Last documented LVEF in Allscripts was 27%. HsenTnT 1320H.  - TTE  - holding home statin  - trend HsenTnT  - f/u ECG    PULM    - Duoneb PRN instead of home Symbicort  - wean off LFNC    ETHICS    - full code    ID    #Neutrophilic leukocytosis  Likely 2/2 infection. Procal 3.21H.  - s/p IVF  - f/u lactate 2/4 AM  - empiric Zosyn  - f/u ucx and bcx  - f/u U/S abd and CT A/P w/o con 76M with PMHx HFrEF (TTE 8/2011 showed moderate segmental LVSD), CAD s/p CABG, HTN, DM.  Presented from [facility] with abd pain and coffee ground emesis. Found to be hypotensive, anemic to Hb 5.4, with DUSTIN, and with c/f UGIB.    NEURO/PSYCH    - holding home citalopram    SKIN/MSK    GI/NUTRITION    #Coffee ground emesis  Likely UGIB. BUN 165H.  - pantoprazole infusion s/p 80mg IVP  - EGD planned for 2/3 AM    #Abd pain  Likely 2/2 UGIB. POCUS showed cholelithiasis.  - f/u U/S abd and CT A/P w/o con    #GERD  - holding home omeprazole    #Constipation  - holding home senna    #Diet: NPO    ENDO    #DM  - ISS  - holding home metformin    NEPHRO//METABOLIC/VOLUME    #DUSTIN  Likely prerenal. BUN 165H.  - Hung  - s/p IVF    #HypoNa  Normal TSH and fT4.  - f/u cortisol 2/3 AM  - s/p IVF    HEME/ONC    #Anemia  Likely 2/2 UGIB.  - Hb goal >=8  - s/p pRBCs  - CBC Q6H  - maintain active T&S    #Thrombocytopenia  Likely reactive.  - CBC Q6H    #VTE ppx: SCDs    CV    #Shock  Possibly 2/2 infection, UGIB, adrenal insufficiency, cardiogenic shock. Normal TSH and fT4.  - norepi  - s/p IVF  - f/u cortisol and HsenTnT 2/3 AM  - f/u lactate 2/4 AM  - empiric Zosyn  - holding home carvedilol    #HFrEF, CAD, troponinemia   Last documented LVEF in Allscripts was 27%. HsenTnT 1320H.  - TTE  - increased home atorvastatin to high intensity  - trend HsenTnT  - f/u ECG  - aspirin     PULM    - Duoneb PRN instead of home Symbicort  - wean off LFNC    ETHICS    - full code    ID    #Neutrophilic leukocytosis  Likely 2/2 infection. Procal 3.21H.  - s/p IVF  - f/u lactate 2/4 AM  - empiric Zosyn  - f/u ucx and bcx  - f/u U/S abd and CT A/P w/o con 76M with PMHx HFrEF (TTE 8/2011 showed moderate segmental LVSD), CAD s/p CABG, HTN, DM.  Presented from [facility] with abd pain and coffee ground emesis. Found to be hypotensive, anemic to Hb 5.4, with DUSTIN, and with c/f UGIB.    NEURO/PSYCH    - holding home citalopram    SKIN/MSK    GI/NUTRITION    #Coffee ground emesis  Likely UGIB. BUN 165H.  - pantoprazole infusion s/p 80mg IVP  - EGD planned for 2/3 AM    #Abd pain  Likely 2/2 UGIB. POCUS showed cholelithiasis.  - f/u U/S abd and CT A/P w/o con    #GERD  - holding home omeprazole    #Constipation  - holding home senna    #Diet: NPO    ENDO    #DM  - ISS  - holding home metformin    NEPHRO//METABOLIC/VOLUME    #DUSTIN  Likely prerenal. BUN 165H.  - Hung  - s/p IVF    #HypoNa  Normal TSH and fT4.  - f/u cortisol 2/3 AM  - s/p IVF    HEME/ONC    #Anemia  Likely 2/2 UGIB.  - Hb goal >=8  - s/p pRBCs  - CBC Q6H  - maintain active T&S    #Thrombocytopenia  Likely reactive.  - CBC Q6H    #VTE ppx: SCDs    CV    #Shock  Possibly 2/2 infection, UGIB, adrenal insufficiency, cardiogenic shock. Normal TSH and fT4.  - norepi  - s/p IVF  - f/u cortisol and HsenTnT 2/3 AM  - f/u lactate 2/4 AM  - empiric Zosyn  - holding home carvedilol    #HFrEF, CAD, troponinemia   Potential demand ischemia. Last documented LVEF in Allscripts was 27%. HsenTnT 1320H.  - TTE  - increased home atorvastatin to high intensity  - trend HsenTnT  - f/u ECG  - aspirin   - Card consulted 2/3    PULM    - Duoneb PRN instead of home Symbicort  - wean off LFNC    ETHICS    - full code    ID    #Neutrophilic leukocytosis  Likely 2/2 infection. Procal 3.21H.  - s/p IVF  - f/u lactate 2/4 AM  - empiric Zosyn  - f/u ucx and bcx  - f/u U/S abd and CT A/P w/o con

## 2022-02-02 NOTE — ED ADULT NURSE NOTE - OBJECTIVE STATEMENT
Mobile Critical Care RN:.  77 yo M received in [facility] as EMS notification for r/o GI bleed.  Sent from SNF for GIB/coffee ground emesis last PM.  Pt hypotensive to 70/36 in the field with altered mental status.  Per SNF not his baseline.  Pt is A&Ox2/3 but lethargic and slow to answer questions.  Respirations even unlabored.  LS CTA.  dried blood noted to mouth.  + blood in stool.  no BM present.  MM dry.  Skin pale but warm.  R temp as noted.  denies pain.  arrives with non functioning 24G PIV to L wrist.  Bl18G PIV placed in ed.  blood given as ordered.

## 2022-02-02 NOTE — ED ADULT NURSE REASSESSMENT NOTE - NS ED NURSE REASSESS COMMENT FT1
Received report from day RN. Pt resting in stretcher, vitals as charted. Pt is noted to be hypotensive at this time, MD made aware. Pt mentating well, denies chest pain, headache, SOB, abd pain. Will continue to monitor
micu at bedside. Patient resting, no complaints, patient is in bed maintain baseline.
Patient receiving third dose of unit PRBC. Patient resting, in bed, still appears pale. Maintaining baseline status.

## 2022-02-02 NOTE — H&P ADULT - ATTENDING COMMENTS
76M with HFrEF, CAD s/p CABG, DM, HTN, p/w abdominal pain, coffee ground emesis, found to be hypotensive and anemic to 5.4, concern for UGIB.  Patient transfused 3 units of pRBC in ED with initial improvement in hemodynamics, but again became hypotensive to 60/40s requiring initiation of levophed despite appropriate rise in Hb post-transfusion.  Patient also found to have DUSTIN with uremia to 160.     - levophed infusion  - serial cbc, transfuse to Hb >7, GI evaluation and endoscopy, PPI gtt  - check TSH, FT4, cortisol AM  - pan-culture, start on abx with ceftriaxone, check procal  - CT a/p w/o contrast  - place hall  - trend lactate  - serial troponin, 2D echo  - POCUS with GB stones without pericholecystic fluid or GB wall thickening, difficult to obtain any cardiac windows, IVC small and minimally collapsing.  500 cc IVF bolus.  - insulin sliding scale with q6h FS  - DVT ppx with SCD  - guarded prognosis  - hold home cardiac meds  - mely prn    Sukhdev Matta MD

## 2022-02-02 NOTE — ED ADULT TRIAGE NOTE - CHIEF COMPLAINT QUOTE
EMS notification for r/o GI bleed. Pt hypotensive to 70/36 in the field with altered mental status. Charge nurse notified, pt taken directly to [facility].

## 2022-02-03 DIAGNOSIS — E87.2 ACIDOSIS: ICD-10-CM

## 2022-02-03 DIAGNOSIS — N17.9 ACUTE KIDNEY FAILURE, UNSPECIFIED: ICD-10-CM

## 2022-02-03 LAB
A1C WITH ESTIMATED AVERAGE GLUCOSE RESULT: 6.3 % — HIGH (ref 4–5.6)
A1C WITH ESTIMATED AVERAGE GLUCOSE RESULT: 6.8 % — HIGH (ref 4–5.6)
ALBUMIN SERPL ELPH-MCNC: 2.8 G/DL — LOW (ref 3.3–5)
ALBUMIN SERPL ELPH-MCNC: 2.9 G/DL — LOW (ref 3.3–5)
ALP SERPL-CCNC: 65 U/L — SIGNIFICANT CHANGE UP (ref 40–120)
ALP SERPL-CCNC: 71 U/L — SIGNIFICANT CHANGE UP (ref 40–120)
ALT FLD-CCNC: 10 U/L — SIGNIFICANT CHANGE UP (ref 4–41)
ALT FLD-CCNC: 12 U/L — SIGNIFICANT CHANGE UP (ref 4–41)
ANION GAP SERPL CALC-SCNC: 22 MMOL/L — HIGH (ref 7–14)
ANION GAP SERPL CALC-SCNC: 23 MMOL/L — HIGH (ref 7–14)
ANION GAP SERPL CALC-SCNC: 26 MMOL/L — HIGH (ref 7–14)
ANISOCYTOSIS BLD QL: SLIGHT — SIGNIFICANT CHANGE UP
APPEARANCE UR: ABNORMAL
APTT BLD: 25.2 SEC — LOW (ref 27–36.3)
AST SERPL-CCNC: 66 U/L — HIGH (ref 4–40)
AST SERPL-CCNC: 66 U/L — HIGH (ref 4–40)
B-OH-BUTYR SERPL-SCNC: 6.2 MMOL/L — HIGH (ref 0–0.4)
BACTERIA # UR AUTO: ABNORMAL
BASE EXCESS BLDV CALC-SCNC: -11.3 MMOL/L — LOW (ref -2–3)
BASE EXCESS BLDV CALC-SCNC: -14.5 MMOL/L — LOW (ref -2–3)
BASOPHILS # BLD AUTO: 0 K/UL — SIGNIFICANT CHANGE UP (ref 0–0.2)
BASOPHILS # BLD AUTO: 0.03 K/UL — SIGNIFICANT CHANGE UP (ref 0–0.2)
BASOPHILS # BLD AUTO: 0.03 K/UL — SIGNIFICANT CHANGE UP (ref 0–0.2)
BASOPHILS # BLD AUTO: 0.06 K/UL — SIGNIFICANT CHANGE UP (ref 0–0.2)
BASOPHILS NFR BLD AUTO: 0 % — SIGNIFICANT CHANGE UP (ref 0–2)
BASOPHILS NFR BLD AUTO: 0.2 % — SIGNIFICANT CHANGE UP (ref 0–2)
BASOPHILS NFR BLD AUTO: 0.2 % — SIGNIFICANT CHANGE UP (ref 0–2)
BASOPHILS NFR BLD AUTO: 0.3 % — SIGNIFICANT CHANGE UP (ref 0–2)
BILIRUB SERPL-MCNC: 1.5 MG/DL — HIGH (ref 0.2–1.2)
BILIRUB SERPL-MCNC: 2.1 MG/DL — HIGH (ref 0.2–1.2)
BILIRUB UR-MCNC: NEGATIVE — SIGNIFICANT CHANGE UP
BLOOD GAS VENOUS COMPREHENSIVE RESULT: SIGNIFICANT CHANGE UP
BUN SERPL-MCNC: 112 MG/DL — HIGH (ref 7–23)
BUN SERPL-MCNC: 118 MG/DL — HIGH (ref 7–23)
BUN SERPL-MCNC: 146 MG/DL — HIGH (ref 7–23)
BUN SERPL-MCNC: 146 MG/DL — HIGH (ref 7–23)
BUN SERPL-MCNC: 96 MG/DL — HIGH (ref 7–23)
CA-I SERPL-SCNC: 1.23 MMOL/L — SIGNIFICANT CHANGE UP (ref 1.15–1.33)
CALCIUM SERPL-MCNC: 6.9 MG/DL — LOW (ref 8.4–10.5)
CALCIUM SERPL-MCNC: 7.8 MG/DL — LOW (ref 8.4–10.5)
CALCIUM SERPL-MCNC: 8.2 MG/DL — LOW (ref 8.4–10.5)
CALCIUM SERPL-MCNC: 8.3 MG/DL — LOW (ref 8.4–10.5)
CALCIUM SERPL-MCNC: 8.4 MG/DL — SIGNIFICANT CHANGE UP (ref 8.4–10.5)
CHLORIDE BLDV-SCNC: 94 MMOL/L — LOW (ref 96–108)
CHLORIDE BLDV-SCNC: 97 MMOL/L — SIGNIFICANT CHANGE UP (ref 96–108)
CHLORIDE SERPL-SCNC: 100 MMOL/L — SIGNIFICANT CHANGE UP (ref 98–107)
CHLORIDE SERPL-SCNC: 100 MMOL/L — SIGNIFICANT CHANGE UP (ref 98–107)
CHLORIDE SERPL-SCNC: 86 MMOL/L — LOW (ref 98–107)
CHLORIDE SERPL-SCNC: 92 MMOL/L — LOW (ref 98–107)
CHLORIDE SERPL-SCNC: 93 MMOL/L — LOW (ref 98–107)
CO2 BLDV-SCNC: 12.6 MMOL/L — LOW (ref 22–26)
CO2 BLDV-SCNC: 15.8 MMOL/L — LOW (ref 22–26)
CO2 SERPL-SCNC: 10 MMOL/L — CRITICAL LOW (ref 22–31)
CO2 SERPL-SCNC: 12 MMOL/L — LOW (ref 22–31)
CO2 SERPL-SCNC: 13 MMOL/L — LOW (ref 22–31)
CO2 SERPL-SCNC: 14 MMOL/L — LOW (ref 22–31)
CO2 SERPL-SCNC: 15 MMOL/L — LOW (ref 22–31)
COLOR SPEC: SIGNIFICANT CHANGE UP
COMMENT - URINE: SIGNIFICANT CHANGE UP
CORTIS AM PEAK SERPL-MCNC: 27.2 UG/DL — HIGH (ref 6–18.4)
CREAT SERPL-MCNC: 1.79 MG/DL — HIGH (ref 0.5–1.3)
CREAT SERPL-MCNC: 1.97 MG/DL — HIGH (ref 0.5–1.3)
CREAT SERPL-MCNC: 2.18 MG/DL — HIGH (ref 0.5–1.3)
CREAT SERPL-MCNC: 2.51 MG/DL — HIGH (ref 0.5–1.3)
CREAT SERPL-MCNC: 2.54 MG/DL — HIGH (ref 0.5–1.3)
DIFF PNL FLD: ABNORMAL
EOSINOPHIL # BLD AUTO: 0 K/UL — SIGNIFICANT CHANGE UP (ref 0–0.5)
EOSINOPHIL # BLD AUTO: 0.01 K/UL — SIGNIFICANT CHANGE UP (ref 0–0.5)
EOSINOPHIL # BLD AUTO: 0.03 K/UL — SIGNIFICANT CHANGE UP (ref 0–0.5)
EOSINOPHIL # BLD AUTO: 0.04 K/UL — SIGNIFICANT CHANGE UP (ref 0–0.5)
EOSINOPHIL NFR BLD AUTO: 0 % — SIGNIFICANT CHANGE UP (ref 0–6)
EOSINOPHIL NFR BLD AUTO: 0.1 % — SIGNIFICANT CHANGE UP (ref 0–6)
EOSINOPHIL NFR BLD AUTO: 0.2 % — SIGNIFICANT CHANGE UP (ref 0–6)
EOSINOPHIL NFR BLD AUTO: 0.3 % — SIGNIFICANT CHANGE UP (ref 0–6)
EPI CELLS # UR: ABNORMAL
ESTIMATED AVERAGE GLUCOSE: 134 — SIGNIFICANT CHANGE UP
ESTIMATED AVERAGE GLUCOSE: 148 — SIGNIFICANT CHANGE UP
GAS PNL BLDV: 126 MMOL/L — LOW (ref 136–145)
GAS PNL BLDV: 129 MMOL/L — LOW (ref 136–145)
GAS PNL BLDV: SIGNIFICANT CHANGE UP
GIANT PLATELETS BLD QL SMEAR: PRESENT — SIGNIFICANT CHANGE UP
GLUCOSE BLDC GLUCOMTR-MCNC: 132 MG/DL — HIGH (ref 70–99)
GLUCOSE BLDC GLUCOMTR-MCNC: 137 MG/DL — HIGH (ref 70–99)
GLUCOSE BLDC GLUCOMTR-MCNC: 139 MG/DL — HIGH (ref 70–99)
GLUCOSE BLDC GLUCOMTR-MCNC: 197 MG/DL — HIGH (ref 70–99)
GLUCOSE BLDC GLUCOMTR-MCNC: 232 MG/DL — HIGH (ref 70–99)
GLUCOSE BLDC GLUCOMTR-MCNC: 272 MG/DL — HIGH (ref 70–99)
GLUCOSE BLDC GLUCOMTR-MCNC: 276 MG/DL — HIGH (ref 70–99)
GLUCOSE BLDC GLUCOMTR-MCNC: 291 MG/DL — HIGH (ref 70–99)
GLUCOSE BLDC GLUCOMTR-MCNC: 301 MG/DL — HIGH (ref 70–99)
GLUCOSE BLDC GLUCOMTR-MCNC: 95 MG/DL — SIGNIFICANT CHANGE UP (ref 70–99)
GLUCOSE BLDV-MCNC: 148 MG/DL — HIGH (ref 70–99)
GLUCOSE BLDV-MCNC: 356 MG/DL — HIGH (ref 70–99)
GLUCOSE SERPL-MCNC: 165 MG/DL — HIGH (ref 70–99)
GLUCOSE SERPL-MCNC: 276 MG/DL — HIGH (ref 70–99)
GLUCOSE SERPL-MCNC: 297 MG/DL — HIGH (ref 70–99)
GLUCOSE SERPL-MCNC: 304 MG/DL — HIGH (ref 70–99)
GLUCOSE SERPL-MCNC: 711 MG/DL — CRITICAL HIGH (ref 70–99)
GLUCOSE UR QL: ABNORMAL
HAPTOGLOB SERPL-MCNC: 253 MG/DL — HIGH (ref 34–200)
HCO3 BLDV-SCNC: 12 MMOL/L — LOW (ref 22–29)
HCO3 BLDV-SCNC: 15 MMOL/L — LOW (ref 22–29)
HCT VFR BLD CALC: 23.9 % — LOW (ref 39–50)
HCT VFR BLD CALC: 25.7 % — LOW (ref 39–50)
HCT VFR BLD CALC: 26 % — LOW (ref 39–50)
HCT VFR BLD CALC: 27.5 % — LOW (ref 39–50)
HCT VFR BLDA CALC: 26 % — LOW (ref 39–51)
HCT VFR BLDA CALC: 28 % — LOW (ref 39–51)
HGB BLD CALC-MCNC: 8.6 G/DL — LOW (ref 13–17)
HGB BLD CALC-MCNC: 9.4 G/DL — LOW (ref 13–17)
HGB BLD-MCNC: 8.3 G/DL — LOW (ref 13–17)
HGB BLD-MCNC: 8.9 G/DL — LOW (ref 13–17)
HGB BLD-MCNC: 9 G/DL — LOW (ref 13–17)
HGB BLD-MCNC: 9.5 G/DL — LOW (ref 13–17)
HYALINE CASTS # UR AUTO: 2 /LPF — SIGNIFICANT CHANGE UP (ref 0–7)
IANC: 11.83 K/UL — HIGH (ref 1.5–8.5)
IANC: 13.71 K/UL — HIGH (ref 1.5–8.5)
IANC: 9.47 K/UL — HIGH (ref 1.5–8.5)
IANC: 9.53 K/UL — HIGH (ref 1.5–8.5)
IMM GRANULOCYTES NFR BLD AUTO: 2.6 % — HIGH (ref 0–1.5)
IMM GRANULOCYTES NFR BLD AUTO: 3.3 % — HIGH (ref 0–1.5)
IMM GRANULOCYTES NFR BLD AUTO: 4.3 % — HIGH (ref 0–1.5)
KETONES UR-MCNC: ABNORMAL
LACTATE BLDA-MCNC: 1.3 MMOL/L — SIGNIFICANT CHANGE UP (ref 0.5–2)
LACTATE BLDV-MCNC: 1.1 MMOL/L — SIGNIFICANT CHANGE UP (ref 0.5–2)
LACTATE BLDV-MCNC: 2.4 MMOL/L — HIGH (ref 0.5–2)
LDH SERPL L TO P-CCNC: 428 U/L — HIGH (ref 135–225)
LEUKOCYTE ESTERASE UR-ACNC: ABNORMAL
LYMPHOCYTES # BLD AUTO: 1.19 K/UL — SIGNIFICANT CHANGE UP (ref 1–3.3)
LYMPHOCYTES # BLD AUTO: 1.36 K/UL — SIGNIFICANT CHANGE UP (ref 1–3.3)
LYMPHOCYTES # BLD AUTO: 1.36 K/UL — SIGNIFICANT CHANGE UP (ref 1–3.3)
LYMPHOCYTES # BLD AUTO: 10.6 % — LOW (ref 13–44)
LYMPHOCYTES # BLD AUTO: 13.4 % — SIGNIFICANT CHANGE UP (ref 13–44)
LYMPHOCYTES # BLD AUTO: 2.15 K/UL — SIGNIFICANT CHANGE UP (ref 1–3.3)
LYMPHOCYTES # BLD AUTO: 7.7 % — LOW (ref 13–44)
LYMPHOCYTES # BLD AUTO: 9.5 % — LOW (ref 13–44)
MAGNESIUM SERPL-MCNC: 1.6 MG/DL — SIGNIFICANT CHANGE UP (ref 1.6–2.6)
MANUAL SMEAR VERIFICATION: SIGNIFICANT CHANGE UP
MCHC RBC-ENTMCNC: 29.7 PG — SIGNIFICANT CHANGE UP (ref 27–34)
MCHC RBC-ENTMCNC: 29.9 PG — SIGNIFICANT CHANGE UP (ref 27–34)
MCHC RBC-ENTMCNC: 29.9 PG — SIGNIFICANT CHANGE UP (ref 27–34)
MCHC RBC-ENTMCNC: 30.1 PG — SIGNIFICANT CHANGE UP (ref 27–34)
MCHC RBC-ENTMCNC: 34.5 GM/DL — SIGNIFICANT CHANGE UP (ref 32–36)
MCHC RBC-ENTMCNC: 34.6 GM/DL — SIGNIFICANT CHANGE UP (ref 32–36)
MCHC RBC-ENTMCNC: 34.6 GM/DL — SIGNIFICANT CHANGE UP (ref 32–36)
MCHC RBC-ENTMCNC: 34.7 GM/DL — SIGNIFICANT CHANGE UP (ref 32–36)
MCV RBC AUTO: 85.8 FL — SIGNIFICANT CHANGE UP (ref 80–100)
MCV RBC AUTO: 86 FL — SIGNIFICANT CHANGE UP (ref 80–100)
MCV RBC AUTO: 86.2 FL — SIGNIFICANT CHANGE UP (ref 80–100)
MCV RBC AUTO: 87 FL — SIGNIFICANT CHANGE UP (ref 80–100)
MICROCYTES BLD QL: SLIGHT — SIGNIFICANT CHANGE UP
MONOCYTES # BLD AUTO: 0.85 K/UL — SIGNIFICANT CHANGE UP (ref 0–0.9)
MONOCYTES # BLD AUTO: 1.35 K/UL — HIGH (ref 0–0.9)
MONOCYTES # BLD AUTO: 1.55 K/UL — HIGH (ref 0–0.9)
MONOCYTES # BLD AUTO: 1.65 K/UL — HIGH (ref 0–0.9)
MONOCYTES NFR BLD AUTO: 10.8 % — SIGNIFICANT CHANGE UP (ref 2–14)
MONOCYTES NFR BLD AUTO: 12.1 % — SIGNIFICANT CHANGE UP (ref 2–14)
MONOCYTES NFR BLD AUTO: 5.3 % — SIGNIFICANT CHANGE UP (ref 2–14)
MONOCYTES NFR BLD AUTO: 9.4 % — SIGNIFICANT CHANGE UP (ref 2–14)
MRSA PCR RESULT.: DETECTED
MYELOCYTES NFR BLD: 0.9 % — HIGH (ref 0–0)
NEUTROPHILS # BLD AUTO: 12.89 K/UL — HIGH (ref 1.8–7.4)
NEUTROPHILS # BLD AUTO: 13.71 K/UL — HIGH (ref 1.8–7.4)
NEUTROPHILS # BLD AUTO: 9.47 K/UL — HIGH (ref 1.8–7.4)
NEUTROPHILS # BLD AUTO: 9.53 K/UL — HIGH (ref 1.8–7.4)
NEUTROPHILS NFR BLD AUTO: 73.6 % — SIGNIFICANT CHANGE UP (ref 43–77)
NEUTROPHILS NFR BLD AUTO: 76.6 % — SIGNIFICANT CHANGE UP (ref 43–77)
NEUTROPHILS NFR BLD AUTO: 78.2 % — HIGH (ref 43–77)
NEUTROPHILS NFR BLD AUTO: 79.5 % — HIGH (ref 43–77)
NEUTS BAND # BLD: 0.9 % — SIGNIFICANT CHANGE UP (ref 0–6)
NITRITE UR-MCNC: NEGATIVE — SIGNIFICANT CHANGE UP
NRBC # BLD: 0 /100 WBCS — SIGNIFICANT CHANGE UP
NRBC # FLD: 0.04 K/UL — HIGH
NRBC # FLD: 0.04 K/UL — HIGH
NRBC # FLD: 0.05 K/UL — HIGH
PCO2 BLDV: 28 MMHG — LOW (ref 42–55)
PCO2 BLDV: 33 MMHG — LOW (ref 42–55)
PH BLDV: 7.23 — LOW (ref 7.32–7.43)
PH BLDV: 7.26 — LOW (ref 7.32–7.43)
PH UR: 5 — SIGNIFICANT CHANGE UP (ref 5–8)
PHOSPHATE SERPL-MCNC: 3.2 MG/DL — SIGNIFICANT CHANGE UP (ref 2.5–4.5)
PLAT MORPH BLD: ABNORMAL
PLATELET # BLD AUTO: 122 K/UL — LOW (ref 150–400)
PLATELET # BLD AUTO: 126 K/UL — LOW (ref 150–400)
PLATELET # BLD AUTO: 134 K/UL — LOW (ref 150–400)
PLATELET # BLD AUTO: 140 K/UL — LOW (ref 150–400)
PLATELET COUNT - ESTIMATE: ABNORMAL
PO2 BLDV: 26 MMHG — SIGNIFICANT CHANGE UP
PO2 BLDV: 48 MMHG — SIGNIFICANT CHANGE UP
POIKILOCYTOSIS BLD QL AUTO: SLIGHT — SIGNIFICANT CHANGE UP
POTASSIUM BLDV-SCNC: 4.7 MMOL/L — SIGNIFICANT CHANGE UP (ref 3.5–5.1)
POTASSIUM BLDV-SCNC: 4.7 MMOL/L — SIGNIFICANT CHANGE UP (ref 3.5–5.1)
POTASSIUM SERPL-MCNC: 2.8 MMOL/L — CRITICAL LOW (ref 3.5–5.3)
POTASSIUM SERPL-MCNC: 4.2 MMOL/L — SIGNIFICANT CHANGE UP (ref 3.5–5.3)
POTASSIUM SERPL-MCNC: 4.3 MMOL/L — SIGNIFICANT CHANGE UP (ref 3.5–5.3)
POTASSIUM SERPL-MCNC: 4.4 MMOL/L — SIGNIFICANT CHANGE UP (ref 3.5–5.3)
POTASSIUM SERPL-MCNC: 4.5 MMOL/L — SIGNIFICANT CHANGE UP (ref 3.5–5.3)
POTASSIUM SERPL-SCNC: 2.8 MMOL/L — CRITICAL LOW (ref 3.5–5.3)
POTASSIUM SERPL-SCNC: 4.2 MMOL/L — SIGNIFICANT CHANGE UP (ref 3.5–5.3)
POTASSIUM SERPL-SCNC: 4.3 MMOL/L — SIGNIFICANT CHANGE UP (ref 3.5–5.3)
POTASSIUM SERPL-SCNC: 4.4 MMOL/L — SIGNIFICANT CHANGE UP (ref 3.5–5.3)
POTASSIUM SERPL-SCNC: 4.5 MMOL/L — SIGNIFICANT CHANGE UP (ref 3.5–5.3)
PROCALCITONIN SERPL-MCNC: 3.21 NG/ML — HIGH (ref 0.02–0.1)
PROT SERPL-MCNC: 5.4 G/DL — LOW (ref 6–8.3)
PROT SERPL-MCNC: 5.7 G/DL — LOW (ref 6–8.3)
PROT UR-MCNC: NEGATIVE — SIGNIFICANT CHANGE UP
RBC # BLD: 2.78 M/UL — LOW (ref 4.2–5.8)
RBC # BLD: 2.98 M/UL — LOW (ref 4.2–5.8)
RBC # BLD: 3.03 M/UL — LOW (ref 4.2–5.8)
RBC # BLD: 3.16 M/UL — LOW (ref 4.2–5.8)
RBC # FLD: 14.4 % — SIGNIFICANT CHANGE UP (ref 10.3–14.5)
RBC # FLD: 14.7 % — HIGH (ref 10.3–14.5)
RBC # FLD: 14.8 % — HIGH (ref 10.3–14.5)
RBC # FLD: 14.9 % — HIGH (ref 10.3–14.5)
RBC BLD AUTO: ABNORMAL
RBC CASTS # UR COMP ASSIST: 0 /HPF — SIGNIFICANT CHANGE UP (ref 0–4)
RETICS #: 97.6 K/UL — SIGNIFICANT CHANGE UP (ref 25–125)
RETICS/RBC NFR: 3.1 % — HIGH (ref 0.5–2.5)
S AUREUS DNA NOSE QL NAA+PROBE: DETECTED
SAO2 % BLDV: 43.9 % — SIGNIFICANT CHANGE UP
SAO2 % BLDV: 80 % — SIGNIFICANT CHANGE UP
SODIUM SERPL-SCNC: 124 MMOL/L — LOW (ref 135–145)
SODIUM SERPL-SCNC: 128 MMOL/L — LOW (ref 135–145)
SODIUM SERPL-SCNC: 129 MMOL/L — LOW (ref 135–145)
SODIUM SERPL-SCNC: 134 MMOL/L — LOW (ref 135–145)
SODIUM SERPL-SCNC: 135 MMOL/L — SIGNIFICANT CHANGE UP (ref 135–145)
SP GR SPEC: 1.02 — SIGNIFICANT CHANGE UP (ref 1–1.05)
T4 FREE SERPL-MCNC: 1.6 NG/DL — SIGNIFICANT CHANGE UP (ref 0.9–1.8)
TROPONIN T, HIGH SENSITIVITY RESULT: 1192 NG/L — CRITICAL HIGH
TROPONIN T, HIGH SENSITIVITY RESULT: 1320 NG/L — CRITICAL HIGH
TSH SERPL-MCNC: 2.99 UIU/ML — SIGNIFICANT CHANGE UP (ref 0.27–4.2)
UROBILINOGEN FLD QL: SIGNIFICANT CHANGE UP
WBC # BLD: 12.47 K/UL — HIGH (ref 3.8–10.5)
WBC # BLD: 12.86 K/UL — HIGH (ref 3.8–10.5)
WBC # BLD: 16.03 K/UL — HIGH (ref 3.8–10.5)
WBC # BLD: 17.55 K/UL — HIGH (ref 3.8–10.5)
WBC # FLD AUTO: 12.47 K/UL — HIGH (ref 3.8–10.5)
WBC # FLD AUTO: 12.86 K/UL — HIGH (ref 3.8–10.5)
WBC # FLD AUTO: 16.03 K/UL — HIGH (ref 3.8–10.5)
WBC # FLD AUTO: 17.55 K/UL — HIGH (ref 3.8–10.5)
WBC UR QL: 49 /HPF — HIGH (ref 0–5)

## 2022-02-03 PROCEDURE — 99233 SBSQ HOSP IP/OBS HIGH 50: CPT | Mod: GC

## 2022-02-03 PROCEDURE — 99223 1ST HOSP IP/OBS HIGH 75: CPT

## 2022-02-03 PROCEDURE — 93306 TTE W/DOPPLER COMPLETE: CPT | Mod: 26

## 2022-02-03 PROCEDURE — 99223 1ST HOSP IP/OBS HIGH 75: CPT | Mod: GC

## 2022-02-03 PROCEDURE — 71045 X-RAY EXAM CHEST 1 VIEW: CPT | Mod: 26

## 2022-02-03 PROCEDURE — 76700 US EXAM ABDOM COMPLETE: CPT | Mod: 26

## 2022-02-03 PROCEDURE — 99291 CRITICAL CARE FIRST HOUR: CPT | Mod: 25

## 2022-02-03 PROCEDURE — 93308 TTE F-UP OR LMTD: CPT | Mod: 26,GC

## 2022-02-03 PROCEDURE — 76604 US EXAM CHEST: CPT | Mod: 26,GC

## 2022-02-03 RX ORDER — VANCOMYCIN HCL 1 G
1000 VIAL (EA) INTRAVENOUS ONCE
Refills: 0 | Status: COMPLETED | OUTPATIENT
Start: 2022-02-03 | End: 2022-02-03

## 2022-02-03 RX ORDER — DEXTROSE 50 % IN WATER 50 %
25 SYRINGE (ML) INTRAVENOUS ONCE
Refills: 0 | Status: DISCONTINUED | OUTPATIENT
Start: 2022-02-03 | End: 2022-02-18

## 2022-02-03 RX ORDER — CITALOPRAM 10 MG/1
10 TABLET, FILM COATED ORAL DAILY
Refills: 0 | Status: DISCONTINUED | OUTPATIENT
Start: 2022-02-03 | End: 2022-02-03

## 2022-02-03 RX ORDER — SODIUM BICARBONATE 1 MEQ/ML
50 SYRINGE (ML) INTRAVENOUS ONCE
Refills: 0 | Status: COMPLETED | OUTPATIENT
Start: 2022-02-03 | End: 2022-02-03

## 2022-02-03 RX ORDER — PIPERACILLIN AND TAZOBACTAM 4; .5 G/20ML; G/20ML
3.38 INJECTION, POWDER, LYOPHILIZED, FOR SOLUTION INTRAVENOUS ONCE
Refills: 0 | Status: COMPLETED | OUTPATIENT
Start: 2022-02-03 | End: 2022-02-03

## 2022-02-03 RX ORDER — DEXTROSE MONOHYDRATE, SODIUM CHLORIDE, AND POTASSIUM CHLORIDE 50; .745; 4.5 G/1000ML; G/1000ML; G/1000ML
1000 INJECTION, SOLUTION INTRAVENOUS
Refills: 0 | Status: DISCONTINUED | OUTPATIENT
Start: 2022-02-03 | End: 2022-02-04

## 2022-02-03 RX ORDER — ATORVASTATIN CALCIUM 80 MG/1
40 TABLET, FILM COATED ORAL DAILY
Refills: 0 | Status: DISCONTINUED | OUTPATIENT
Start: 2022-02-03 | End: 2022-02-18

## 2022-02-03 RX ORDER — SODIUM CHLORIDE 9 MG/ML
1000 INJECTION, SOLUTION INTRAVENOUS
Refills: 0 | Status: DISCONTINUED | OUTPATIENT
Start: 2022-02-03 | End: 2022-02-03

## 2022-02-03 RX ORDER — ASPIRIN/CALCIUM CARB/MAGNESIUM 324 MG
81 TABLET ORAL DAILY
Refills: 0 | Status: DISCONTINUED | OUTPATIENT
Start: 2022-02-03 | End: 2022-02-05

## 2022-02-03 RX ORDER — VANCOMYCIN HCL 1 G
1000 VIAL (EA) INTRAVENOUS ONCE
Refills: 0 | Status: COMPLETED | OUTPATIENT
Start: 2022-02-03 | End: 2022-02-04

## 2022-02-03 RX ORDER — DEXTROSE 50 % IN WATER 50 %
12.5 SYRINGE (ML) INTRAVENOUS ONCE
Refills: 0 | Status: DISCONTINUED | OUTPATIENT
Start: 2022-02-03 | End: 2022-02-18

## 2022-02-03 RX ORDER — INSULIN HUMAN 100 [IU]/ML
3 INJECTION, SOLUTION SUBCUTANEOUS
Qty: 100 | Refills: 0 | Status: DISCONTINUED | OUTPATIENT
Start: 2022-02-03 | End: 2022-02-05

## 2022-02-03 RX ORDER — SODIUM CHLORIDE 9 MG/ML
500 INJECTION, SOLUTION INTRAVENOUS ONCE
Refills: 0 | Status: COMPLETED | OUTPATIENT
Start: 2022-02-03 | End: 2022-02-03

## 2022-02-03 RX ORDER — PIPERACILLIN AND TAZOBACTAM 4; .5 G/20ML; G/20ML
3.38 INJECTION, POWDER, LYOPHILIZED, FOR SOLUTION INTRAVENOUS EVERY 12 HOURS
Refills: 0 | Status: COMPLETED | OUTPATIENT
Start: 2022-02-03 | End: 2022-02-08

## 2022-02-03 RX ORDER — IPRATROPIUM/ALBUTEROL SULFATE 18-103MCG
3 AEROSOL WITH ADAPTER (GRAM) INHALATION EVERY 6 HOURS
Refills: 0 | Status: DISCONTINUED | OUTPATIENT
Start: 2022-02-03 | End: 2022-02-03

## 2022-02-03 RX ORDER — BUDESONIDE AND FORMOTEROL FUMARATE DIHYDRATE 160; 4.5 UG/1; UG/1
2 AEROSOL RESPIRATORY (INHALATION)
Refills: 0 | Status: DISCONTINUED | OUTPATIENT
Start: 2022-02-03 | End: 2022-02-18

## 2022-02-03 RX ORDER — INSULIN HUMAN 100 [IU]/ML
9 INJECTION, SOLUTION SUBCUTANEOUS
Qty: 100 | Refills: 0 | Status: DISCONTINUED | OUTPATIENT
Start: 2022-02-03 | End: 2022-02-03

## 2022-02-03 RX ADMIN — SODIUM CHLORIDE 500 MILLILITER(S): 9 INJECTION, SOLUTION INTRAVENOUS at 11:34

## 2022-02-03 RX ADMIN — ATORVASTATIN CALCIUM 40 MILLIGRAM(S): 80 TABLET, FILM COATED ORAL at 11:35

## 2022-02-03 RX ADMIN — PIPERACILLIN AND TAZOBACTAM 25 GRAM(S): 4; .5 INJECTION, POWDER, LYOPHILIZED, FOR SOLUTION INTRAVENOUS at 18:03

## 2022-02-03 RX ADMIN — Medication 4: at 11:48

## 2022-02-03 RX ADMIN — Medication 81 MILLIGRAM(S): at 11:35

## 2022-02-03 RX ADMIN — INSULIN HUMAN 3 UNIT(S)/HR: 100 INJECTION, SOLUTION SUBCUTANEOUS at 22:44

## 2022-02-03 RX ADMIN — INSULIN HUMAN 6 UNIT(S)/HR: 100 INJECTION, SOLUTION SUBCUTANEOUS at 20:26

## 2022-02-03 RX ADMIN — BUDESONIDE AND FORMOTEROL FUMARATE DIHYDRATE 2 PUFF(S): 160; 4.5 AEROSOL RESPIRATORY (INHALATION) at 22:28

## 2022-02-03 RX ADMIN — PIPERACILLIN AND TAZOBACTAM 200 GRAM(S): 4; .5 INJECTION, POWDER, LYOPHILIZED, FOR SOLUTION INTRAVENOUS at 03:45

## 2022-02-03 RX ADMIN — Medication 3: at 06:51

## 2022-02-03 RX ADMIN — DEXTROSE MONOHYDRATE, SODIUM CHLORIDE, AND POTASSIUM CHLORIDE 200 MILLILITER(S): 50; .745; 4.5 INJECTION, SOLUTION INTRAVENOUS at 22:44

## 2022-02-03 RX ADMIN — INSULIN HUMAN 9 UNIT(S)/HR: 100 INJECTION, SOLUTION SUBCUTANEOUS at 18:02

## 2022-02-03 RX ADMIN — Medication 50 MILLIEQUIVALENT(S): at 11:34

## 2022-02-03 RX ADMIN — Medication 8.68 MICROGRAM(S)/KG/MIN: at 23:41

## 2022-02-03 RX ADMIN — CHLORHEXIDINE GLUCONATE 1 APPLICATION(S): 213 SOLUTION TOPICAL at 06:16

## 2022-02-03 RX ADMIN — Medication 250 MILLIGRAM(S): at 11:33

## 2022-02-03 NOTE — CHART NOTE - NSCHARTNOTEFT_GEN_A_CORE
: Deric Simpson MD    INDICATION: Septic Shock    PROCEDURE:  [X] LIMITED ECHO  [X] LIMITED CHEST  [ ] LIMITED RETROPERITONEAL  [ ] LIMITED ABDOMINAL  [ ] LIMITED DVT  [ ] NEEDLE GUIDANCE VASCULAR  [ ] NEEDLE GUIDANCE THORACENTESIS  [ ] NEEDLE GUIDANCE PARACENTESIS  [ ] NEEDLE GUIDANCE PERICARDIOCENTESIS  [ ] OTHER    FINDINGS:  Scattered B line anteriorly. No pleural effusions.  Severely reduced LV systolic function. RV appears smaller than LV.  IVC appears 1 cm.     INTERPRETATION:  Scattered B lines anteriorly. Severely reduced LV systolic function. IVC ~ 1 cm.     Images stored on [employer] : Deric Simpson MD    INDICATION: Septic Shock    PROCEDURE:  [X] LIMITED ECHO  [X] LIMITED CHEST  [ ] LIMITED RETROPERITONEAL  [ ] LIMITED ABDOMINAL  [ ] LIMITED DVT  [ ] NEEDLE GUIDANCE VASCULAR  [ ] NEEDLE GUIDANCE THORACENTESIS  [ ] NEEDLE GUIDANCE PARACENTESIS  [ ] NEEDLE GUIDANCE PERICARDIOCENTESIS  [ ] OTHER    FINDINGS:  Scattered B line anteriorly. No pleural effusions.  Severely reduced LV systolic function. RV appears smaller than LV.  IVC appears 1 cm.     INTERPRETATION:  Scattered B lines anteriorly. Severely reduced LV systolic function. IVC ~ 1 cm.     Images stored on [employer].    Attending Note: Agree with above. I was present through out the scan.

## 2022-02-03 NOTE — CONSULT NOTE ADULT - SUBJECTIVE AND OBJECTIVE BOX
Tory Quinn MD  Cardiology Fellow  [phone]  All Cardiology service information can be found 24/7 on [email or web address], password: [email or web address]    Patient seen and evaluated at bedside    Chief Complaint:    HPI:  76M PMHx CHF (TTE 8/2011 demonstrating moderate segmental LV systolic dysfunction), CAD, s/p CABG COPD, CVA with left hemiparesis HTN, T2DM presents from [facility] with abdominal pain and coffee ground emesis. Patient states that he started experiencing acute onset of R lower quadrant abdominal pain x 1 week, described as a 7/10 intermittent, sharp, non-radiating pain that is made better with consumption of liquid diet. He also endorses 1-2 episodes of pale green and clear emesis daily within this 1-week time frame. Note that per documentation accompanying patient from NH, patient was being transferred for evaluation of GI bleed and hypotension.    In the ED:  VS: BP /47-66, HR 89-91, RR 17-26, O2 Sat  on 2L, Tmax 97.9  Labs: CBC - WBC 13.72, Hgb 5.6 (baseline is 17.3 in 2015), platelets 123. CMP - Na 128, /3.25 (baseline 1.69 9/2011); AST/ALT 58/10, otherwise normal. VBG 7.37/29/32/17/lactate 4.8.    Patient given: Pantoprazole 80, pRBC 2U, ordered for pantoprazole drip and given one additional unit of pRBC. Patient was noted to be intermittently hypotensive despite fluid resuscitation, was admitted to MICU. (02 Feb 2022 21:58)      PMHx:   Cigarette Smoker    Essential Hypertension    CHF (Congestive Heart Failure)    Diabetes Mellitus    Coronary Artery Disease (CAD) Excluded    CAD (Coronary Atherosclerotic Disease)    Hypercholesteremia    CABG (Coronary Artery Bypass Graft)        PSHx:   History of Appendectomy    CABG (Coronary Artery Bypass Graft)        Allergies:  No Known Allergies      Home Meds:    · 	citalopram 10 mg oral tablet: Last Dose Taken:  , 1 tab(s) orally once a day  · 	omeprazole 20 mg oral delayed release capsule: Last Dose Taken:  , 1 cap(s) orally once a day  · 	metFORMIN 500 mg oral tablet: Last Dose Taken:  , 1 tab(s) orally 2 times a day  · 	carvedilol 6.25 mg oral tablet: Last Dose Taken:  , 1 tab(s) orally 2 times a day  · 	senna 8.6 mg oral tablet: Last Dose Taken:  , 2 tab(s) orally once a day (at bedtime)  · 	Symbicort 80 mcg-4.5 mcg/inh inhalation aerosol: 2 puff(s) inhaled 2 times a day  · 	Vitamin D3 50 mcg (2000 intl units) oral tablet: 1 tab(s) orally once a day  · 	atorvastatin 20 mg oral tablet: Last Dose Taken:  , 1 tab(s) orally once a day      Current Medications:   albuterol/ipratropium for Nebulization 3 milliLiter(s) Nebulizer every 6 hours PRN  aspirin enteric coated 81 milliGRAM(s) Oral daily  atorvastatin 40 milliGRAM(s) Oral daily  budesonide  80 MICROgram(s)/formoterol 4.5 MICROgram(s) Inhaler 2 Puff(s) Inhalation two times a day  chlorhexidine 4% Liquid 1 Application(s) Topical <User Schedule>  dextrose 40% Gel 15 Gram(s) Oral once  dextrose 5%. 1000 milliLiter(s) IV Continuous <Continuous>  dextrose 5%. 1000 milliLiter(s) IV Continuous <Continuous>  dextrose 50% Injectable 25 Gram(s) IV Push once  dextrose 50% Injectable 12.5 Gram(s) IV Push once  dextrose 50% Injectable 25 Gram(s) IV Push once  glucagon  Injectable 1 milliGRAM(s) IntraMuscular once  insulin lispro (ADMELOG) corrective regimen sliding scale   SubCutaneous three times a day before meals  insulin lispro (ADMELOG) corrective regimen sliding scale   SubCutaneous at bedtime  norepinephrine Infusion 0.05 MICROgram(s)/kG/Min IV Continuous <Continuous>  pantoprazole Infusion 8 mG/Hr IV Continuous <Continuous>  piperacillin/tazobactam IVPB.. 3.375 Gram(s) IV Intermittent every 12 hours      FAMILY HISTORY:  Social History: Former smoker     REVIEW OF SYSTEMS:  Patient mentioned no chest pain, dyspnea, or palpitations     Physical Exam:  T(F): 96.7 (02-03), Max: 98 (02-02)  HR: 88 (02-03) (84 - 100)  BP: 129/44 (02-03) (53/32 - 131/90)  RR: 17 (02-03)  SpO2: 100% (02-03)  GENERAL: Fatigued, dry lips   ENT:  No JVD  CHEST/LUNG: clear to auscultation anteriorly   BACK: No spinal tenderness  HEART: Regular rate and rhythm; No murmurs, rubs, or gallops  ABDOMEN: Soft, Nontender, Nondistended; Bowel sounds present  EXTREMITIES:  No clubbing, cyanosis, or edema  PSYCH: Nl behavior, nl affect  NEUROLOGY: AAOx2   SKIN: Normal color, No rashes or lesions  LINES:    Cardiovascular Diagnostic Testing:    ECG:  SR. 1st degree AV block, RBBB, LAFB, tachycardia. V1-V3 TWI with lateral ST changes    Echo:   2011:   CONCLUSIONS:  Technically very difficult study.  1.Mitral annular calcification, otherwise normal mitral  valve. Minimal mitral regurgitation.  2. Endocardium not well visualized; grossly at least  moderate segmental left ventricular systolic dysfunction.  Hypokinesis of the apex and mid to distal septum.  The  anterior wall was not well visualized.  Endocardial  visualization enhanced with intravenous injection of echo  contrast (Definity).  3. Unable to accurately evaluate right ventricular size or  systolic function.      Cath:        CXR: Personally reviewed    Labs: Personally reviewed                        9.5    17.55 )-----------( 140      ( 03 Feb 2022 09:22 )             27.5     02-03    129<L>  |  93<L>  |  146<H>  ----------------------------<  276<H>  4.4   |  10<LL>  |  2.54<H>    Ca    8.4      03 Feb 2022 09:22  Phos  3.2     02-03  Mg     1.60     02-03    TPro  5.7<L>  /  Alb  2.9<L>  /  TBili  1.5<H>  /  DBili  x   /  AST  66<H>  /  ALT  12  /  AlkPhos  71  02-03    PT/INR - ( 02 Feb 2022 14:35 )   PT: 12.0 sec;   INR: 1.05 ratio         PTT - ( 03 Feb 2022 02:54 )  PTT:25.2 sec    CARDIAC MARKERS ( 03 Feb 2022 09:22 )  1192 ng/L / x     / x     / x     / x     / x      CARDIAC MARKERS ( 03 Feb 2022 02:57 )  1320 ng/L / x     / x     / x     / x     / x                  Thyroid Stimulating Hormone, Serum: 2.99 uIU/mL (02-03 @ 02:57)

## 2022-02-03 NOTE — CONSULT NOTE ADULT - ATTENDING COMMENTS
Pt. with hemodynamically mediated DUSTIN in setting of GIB, anemia and hypotension. Scr elevated to 2.54 today. Monitor labs and urine output. Avoid any potential nephrotoxins. Dose medications as per eGFR.

## 2022-02-03 NOTE — CONSULT NOTE ADULT - PROBLEM SELECTOR RECOMMENDATION 2
Pt. with metabolic acidosis in setting of hypotension, hyperglycemia and DUSTIN. SCO2 low at 10 on AM labs. Consider IV sodium bicarbonate therapy. Monitor SCO2.    If any questions, please feel free to contact me     Gutierrez Bateman  Nephrology Fellow  [facility] Pager: [phone]  [facility] Pager: [phone]

## 2022-02-03 NOTE — CONSULT NOTE ADULT - ASSESSMENT
76M with history CAD s/p CABG, HFrEF, HTN, T2DM, sent nursing home with abomdinal pain and coffee ground emesis in setting of home. Labs significant for leukocytosis, hgb of 5.6, trop of 1320 -> 1192, DUSTIN on CKD, lactate of 4.8 now 1.1 on pressors. S/p 3u prbcs. Patient with troponin elevation likely 2/2 demand ischemia     #CAD s/p CABG  #HFrEF   -Agree with echo  -Would no give anti-platelet or AC at this time   -Would hold BB in setting of pressor use  -C/w statin if no c/i   -Patient is at elevated risk for any procedure given acuity of current illness, history of cardiac disease but would pursue further cardiac work up prior to EGD given high likelihood of GI bleed with anemia, hypotension, and coffee ground emesis  76M with history CAD s/p CABG, HFrEF, HTN, T2DM, sent nursing home with abomdinal pain and coffee ground emesis in setting of home. Labs significant for leukocytosis, hgb of 5.6, trop of 1320 -> 1192, DUSTIN on CKD, lactate of 4.8 now 1.1 on pressors. S/p 3u prbcs. Patient with troponin elevation likely 2/2 demand ischemia     #CAD s/p CABG  #HFrEF   -Agree with echo  -Would no give anti-platelet or AC at this time   -Would hold BB in setting of pressor use  -C/w statin if no c/i   -Patient is at elevated risk for any procedure given acuity of current illness, history of cardiac disease but would not pursue further cardiac work up prior to EGD given high likelihood of brisk GI bleed with anemia, hypotension, and coffee ground emesis  76M with history CAD s/p CABG, HFrEF, HTN, T2DM, sent nursing home with abomdinal pain and coffee ground emesis in setting of home. Labs significant for leukocytosis, hgb of 5.6, trop of 1320 -> 1192, DUSTIN on CKD, lactate of 4.8 now 1.1 on pressors. S/p 3u prbcs. Patient with troponin elevation likely 2/2 demand ischemia     #CAD s/p CABG  #HFrEF   -Agree with echo  -Would no give anti-platelet or AC at this time   -Would hold BB in setting of pressor use  -C/w statin if no c/i   -Patient is at elevated risk for any procedure given acuity of current illness, history of cardiac disease but would not pursue further cardiac work up prior to EGD given high likelihood of brisk GI bleed with anemia, hypotension, and coffee ground emesis     This patient was seen and examined personally by me and the plan was discussed with the fellow and/or resident above. Amendments were made as necessary to the above. Agree with the excellent note an plan above. 76M w CAD, HFrEF, HTN, DM2 here w abdominal pain and coffee ground emesis. Now leukocytosis and elevated trop with anemia, on pressors and s/p PRBCs. Likely demand ischemia. Management as above with TTE, holding BB and DAPT pending resolution of GIB.    Albert Bagley MD, MPhil, [facility]  Cardiologist, [facility]  ; [facility] and [facility]  Email: [email or web address] and Cardiovascular Surgery on-service contact/call information, go to [email or web address] and use [email or web address]" to login.  Outpatient Cardiology appointments, call [phone] to arrange with a colleague; I do not have outpatient Cardiology clinic.

## 2022-02-03 NOTE — PROGRESS NOTE ADULT - ASSESSMENT
76M with CHF (EF 27% per [employer] in 2011), CAD s/p CABG 2010, CVA with hemiparesis, GERD, MDD, COPD presenting from NH with coffee ground emesis, encephalopathy and hypotension.    Impression:  #Coffee ground emesis - hgb 5.7 concerning for UGIB. Ddx includes stress ulcer, peptic ulcer disease, erosive gastritis, severe reflux esophagitis, MW tear, NSAID gastropathy, Dieulafoy's lesion, angioectasia. No known hx of liver disease.   #CHF - last documented EF in [employer] 27%    Recommendations:  - discussed with MICU team - being w/u for HF and renal failure with met acidosis - given stable hgb and no further overt bleeding will defer EGD at this time  - Maintain active type & screen  - Transfuse to maintain Hb >/= 8.0 given cardiac disease  - can c/w PPI IV BID  - if develops overt bleeding, please contact GI for re-eval for more urgent endoscopy        Cassandra Trejo PGY-5  Gastroenterology Fellow  Pager #[phone] (ANTOINE) or [phone] (NS)  Available on [employer] Teams.  Please contact on-call GI fellow via answering service ([phone]) after 5pm and before 8am, and on weekends.    76M with CHF (EF 27% per [employer] in 2011), CAD s/p CABG 2010, CVA with hemiparesis, GERD, MDD, COPD presenting from NH with coffee ground emesis, encephalopathy and hypotension.    Impression:  #Coffee ground emesis - hgb 5.7 concerning for UGIB. Ddx includes stress ulcer, peptic ulcer disease, erosive gastritis, severe reflux esophagitis, MW tear, NSAID gastropathy, Dieulafoy's lesion, angioectasia. No known hx of liver disease.   #CHF - last documented EF in [employer] 27%    Recommendations:  - discussed with MICU team - being w/u for HF and renal failure with met acidosis - given stable hgb and no further overt bleeding will defer EGD at this time  - Maintain active type & screen  - Transfuse to maintain Hb >/= 8.0 given cardiac disease  - can c/w PPI IV BID  - if develops overt bleeding, please contact GI for re-eval for more urgent endoscopy      Cassandra Trejo PGY-5  Gastroenterology Fellow  Pager #[phone] (ANT[place]INE) or [phone] (NS)  Available on [employer] Teams.  Please contact on-call GI fellow via answering service ([phone]) after 5pm and before 8am, and on weekends.

## 2022-02-03 NOTE — PROGRESS NOTE ADULT - SUBJECTIVE AND OBJECTIVE BOX
Chief Complaint:  Patient is a 76y old  Male who presents with a chief complaint of Abdominal pain (2022 13:13)      Interval Events: noted to have significant dec LV function on bedside US   - hgb stable since transfusions yesterday  - no further vomiting or BMs      Hospital Medications:  albuterol/ipratropium for Nebulization 3 milliLiter(s) Nebulizer every 6 hours PRN  aspirin enteric coated 81 milliGRAM(s) Oral daily  atorvastatin 40 milliGRAM(s) Oral daily  budesonide  80 MICROgram(s)/formoterol 4.5 MICROgram(s) Inhaler 2 Puff(s) Inhalation two times a day  chlorhexidine 4% Liquid 1 Application(s) Topical <User Schedule>  dextrose 40% Gel 15 Gram(s) Oral once  dextrose 5%. 1000 milliLiter(s) IV Continuous <Continuous>  dextrose 5%. 1000 milliLiter(s) IV Continuous <Continuous>  dextrose 50% Injectable 25 Gram(s) IV Push once  dextrose 50% Injectable 12.5 Gram(s) IV Push once  dextrose 50% Injectable 25 Gram(s) IV Push once  glucagon  Injectable 1 milliGRAM(s) IntraMuscular once  insulin lispro (ADMELOG) corrective regimen sliding scale   SubCutaneous three times a day before meals  insulin lispro (ADMELOG) corrective regimen sliding scale   SubCutaneous at bedtime  norepinephrine Infusion 0.05 MICROgram(s)/kG/Min IV Continuous <Continuous>  pantoprazole Infusion 8 mG/Hr IV Continuous <Continuous>  piperacillin/tazobactam IVPB.. 3.375 Gram(s) IV Intermittent every 12 hours      PMHX/PSHX:  Cigarette Smoker    Essential Hypertension    CHF (Congestive Heart Failure)    Diabetes Mellitus    Coronary Artery Disease (CAD) Excluded    CAD (Coronary Atherosclerotic Disease)    Hypercholesteremia    CABG (Coronary Artery Bypass Graft)    History of Appendectomy    CABG (Coronary Artery Bypass Graft)            ROS:     General:  No weight loss, fevers, chills, night sweats, fatigue   Eyes:  No vision changes  ENT:  No sore throat, pain, runny nose  CV:  No chest pain, palpitations, dizziness   Resp:  No SOB, cough, wheezing  GI:  See HPI  :  No burning with urination, hematuria  Muscle:  No pain, weakness  Neuro:  No weakness/tingling, memory problems  Psych:  No fatigue, insomnia, mood problems, depression  Heme:  No easy bruisability  Skin:  No rash, edema      PHYSICAL EXAM:     GENERAL:  elderly, no distress  HEENT:  NC/AT,  conjunctivae clear, sclera anicteric  CHEST:  Full & symmetric excursion, no increased effort w/ respirations  HEART:  Regular rhythm & rate  ABDOMEN:  Soft, non-tender, non-distended  EXTREMITIES:  no LE  edema  SKIN:  No rash/erythema/ecchymoses/petechiae/wounds/jaundice  NEURO:  Alert, oriented    Vital Signs:  Vital Signs Last 24 Hrs  T(C): 37.2 (2022 12:00), Max: 37.2 (2022 08:00)  T(F): 98.9 (2022 12:00), Max: 98.9 (2022 08:00)  HR: 90 (2022 12:00) (84 - 100)  BP: 122/48 (2022 12:00) (53/32 - 131/90)  BP(mean): 62 (2022 12:00) (40 - 100)  RR: 20 (2022 12:00) (13 - 26)  SpO2: 99% (2022 12:00) (98% - 100%)  Daily Height in cm: 170.18 (2022 02:23)    Daily Weight in k (2022 02:23)    LABS:                        9.5    17.55 )-----------( 140      ( 2022 09:22 )             27.5     02-03    129<L>  |  93<L>  |  146<H>  ----------------------------<  276<H>  4.4   |  10<LL>  |  2.54<H>    Ca    8.4      2022 09:22  Phos  3.2     02-03  Mg     1.60     02-03    TPro  5.7<L>  /  Alb  2.9<L>  /  TBili  1.5<H>  /  DBili  x   /  AST  66<H>  /  ALT  12  /  AlkPhos  71  02-03    LIVER FUNCTIONS - ( 2022 09:22 )  Alb: 2.9 g/dL / Pro: 5.7 g/dL / ALK PHOS: 71 U/L / ALT: 12 U/L / AST: 66 U/L / GGT: x           PT/INR - ( 2022 14:35 )   PT: 12.0 sec;   INR: 1.05 ratio         PTT - ( 2022 02:54 )  PTT:25.2 sec  Urinalysis Basic - ( 2022 05:53 )    Color: Light Yellow / Appearance: Slightly Turbid / S.016 / pH: x  Gluc: x / Ketone: Small  / Bili: Negative / Urobili: <2 mg/dL   Blood: x / Protein: Negative / Nitrite: Negative   Leuk Esterase: Large / RBC: 0 /HPF / WBC 49 /HPF   Sq Epi: x / Non Sq Epi: Moderate / Bacteria: Few          Imaging:           Interval Events: noted to have significant dec LV function on bedside US   - hgb stable since transfusions yesterday  - no further vomiting or BMs      Hospital Medications:  albuterol/ipratropium for Nebulization 3 milliLiter(s) Nebulizer every 6 hours PRN  aspirin enteric coated 81 milliGRAM(s) Oral daily  atorvastatin 40 milliGRAM(s) Oral daily  budesonide  80 MICROgram(s)/formoterol 4.5 MICROgram(s) Inhaler 2 Puff(s) Inhalation two times a day  chlorhexidine 4% Liquid 1 Application(s) Topical <User Schedule>  dextrose 40% Gel 15 Gram(s) Oral once  dextrose 5%. 1000 milliLiter(s) IV Continuous <Continuous>  dextrose 5%. 1000 milliLiter(s) IV Continuous <Continuous>  dextrose 50% Injectable 25 Gram(s) IV Push once  dextrose 50% Injectable 12.5 Gram(s) IV Push once  dextrose 50% Injectable 25 Gram(s) IV Push once  glucagon  Injectable 1 milliGRAM(s) IntraMuscular once  insulin lispro (ADMELOG) corrective regimen sliding scale   SubCutaneous three times a day before meals  insulin lispro (ADMELOG) corrective regimen sliding scale   SubCutaneous at bedtime  norepinephrine Infusion 0.05 MICROgram(s)/kG/Min IV Continuous <Continuous>  pantoprazole Infusion 8 mG/Hr IV Continuous <Continuous>  piperacillin/tazobactam IVPB.. 3.375 Gram(s) IV Intermittent every 12 hours      PMHX/PSHX:  Cigarette Smoker    Essential Hypertension    CHF (Congestive Heart Failure)    Diabetes Mellitus    Coronary Artery Disease (CAD) Excluded    CAD (Coronary Atherosclerotic Disease)    Hypercholesteremia    CABG (Coronary Artery Bypass Graft)    History of Appendectomy    CABG (Coronary Artery Bypass Graft)            ROS:     General:  No weight loss, fevers, chills, night sweats, fatigue   Eyes:  No vision changes  ENT:  No sore throat, pain, runny nose  CV:  No chest pain, palpitations, dizziness   Resp:  No SOB, cough, wheezing  GI:  See HPI  :  No burning with urination, hematuria  Muscle:  No pain, weakness  Neuro:  No weakness/tingling, memory problems  Psych:  No fatigue, insomnia, mood problems, depression  Heme:  No easy bruisability  Skin:  No rash, edema      PHYSICAL EXAM:     GENERAL:  elderly, no distress  HEENT:  NC/AT,  conjunctivae clear, sclera anicteric  CHEST:  Full & symmetric excursion, no increased effort w/ respirations  HEART:  Regular rhythm & rate  ABDOMEN:  Soft, non-tender, non-distended  EXTREMITIES:  no LE  edema  SKIN:  No rash/erythema/ecchymoses/petechiae/wounds/jaundice  NEURO:  Alert, oriented    Vital Signs:  Vital Signs Last 24 Hrs  T(C): 37.2 (2022 12:00), Max: 37.2 (2022 08:00)  T(F): 98.9 (2022 12:00), Max: 98.9 (2022 08:00)  HR: 90 (2022 12:00) (84 - 100)  BP: 122/48 (2022 12:00) (53/32 - 131/90)  BP(mean): 62 (2022 12:00) (40 - 100)  RR: 20 (2022 12:00) (13 - 26)  SpO2: 99% (2022 12:00) (98% - 100%)  Daily Height in cm: 170.18 (2022 02:23)    Daily Weight in k (2022 02:23)    LABS:                        9.5    17.55 )-----------( 140      ( 2022 09:22 )             27.5     02-03    129<L>  |  93<L>  |  146<H>  ----------------------------<  276<H>  4.4   |  10<LL>  |  2.54<H>    Ca    8.4      2022 09:22  Phos  3.2     02-03  Mg     1.60     02-03    TPro  5.7<L>  /  Alb  2.9<L>  /  TBili  1.5<H>  /  DBili  x   /  AST  66<H>  /  ALT  12  /  AlkPhos  71  02-03    LIVER FUNCTIONS - ( 2022 09:22 )  Alb: 2.9 g/dL / Pro: 5.7 g/dL / ALK PHOS: 71 U/L / ALT: 12 U/L / AST: 66 U/L / GGT: x           PT/INR - ( 2022 14:35 )   PT: 12.0 sec;   INR: 1.05 ratio         PTT - ( 2022 02:54 )  PTT:25.2 sec  Urinalysis Basic - ( 2022 05:53 )    Color: Light Yellow / Appearance: Slightly Turbid / S.016 / pH: x  Gluc: x / Ketone: Small  / Bili: Negative / Urobili: <2 mg/dL   Blood: x / Protein: Negative / Nitrite: Negative   Leuk Esterase: Large / RBC: 0 /HPF / WBC 49 /HPF   Sq Epi: x / Non Sq Epi: Moderate / Bacteria: Few          Imaging:

## 2022-02-03 NOTE — CONSULT NOTE ADULT - SUBJECTIVE AND OBJECTIVE BOX
[facility] DIVISION OF KIDNEY DISEASES AND HYPERTENSION -- [phone]  -- INITIAL CONSULT NOTE  --------------------------------------------------------------------------------  HPI: Patient is a 76 year old male with past medical history of HFrEF, CAD, COPD, hypertension and DM 2 presented to [facility] from nursing home for abdominal pain and coffee ground emesis. Admitted to MICU for UGIB. Noted to be hypotensive, blood pressure was as low as 55/32, started on Levophed. Initial Hb also low 5.6, received 3 units pRBC transfusion. Nephrology consulted for DUSTIN. On review of labs on [facility]/[place], patient noted to have Scr of 3.25 on admission on 2/22, then improved to 2.54 today. No prior labs available.     Patient was seen and examined at bedside. Reported feeling ok. He is currently AAOx3. Denies CP or SOB.     PAST HISTORY  --------------------------------------------------------------------------------  PAST MEDICAL & SURGICAL HISTORY:  Cigarette Smoker    Essential Hypertension    CHF (Congestive Heart Failure)    Diabetes Mellitus    CAD (Coronary Atherosclerotic Disease)    Hypercholesteremia    CABG (Coronary Artery Bypass Graft)  Dec 2010    History of Appendectomy    CABG (Coronary Artery Bypass Graft)    FAMILY HISTORY:    PAST SOCIAL HISTORY:    ALLERGIES & MEDICATIONS  --------------------------------------------------------------------------------  Allergies    No Known Allergies    Intolerances    Standing Inpatient Medications  aspirin enteric coated 81 milliGRAM(s) Oral daily  atorvastatin 40 milliGRAM(s) Oral daily  budesonide  80 MICROgram(s)/formoterol 4.5 MICROgram(s) Inhaler 2 Puff(s) Inhalation two times a day  chlorhexidine 4% Liquid 1 Application(s) Topical <User Schedule>  dextrose 40% Gel 15 Gram(s) Oral once  dextrose 5%. 1000 milliLiter(s) IV Continuous <Continuous>  dextrose 5%. 1000 milliLiter(s) IV Continuous <Continuous>  dextrose 50% Injectable 25 Gram(s) IV Push once  dextrose 50% Injectable 12.5 Gram(s) IV Push once  dextrose 50% Injectable 25 Gram(s) IV Push once  glucagon  Injectable 1 milliGRAM(s) IntraMuscular once  insulin lispro (ADMELOG) corrective regimen sliding scale   SubCutaneous three times a day before meals  insulin lispro (ADMELOG) corrective regimen sliding scale   SubCutaneous at bedtime  norepinephrine Infusion 0.05 MICROgram(s)/kG/Min IV Continuous <Continuous>  pantoprazole Infusion 8 mG/Hr IV Continuous <Continuous>  piperacillin/tazobactam IVPB.. 3.375 Gram(s) IV Intermittent every 12 hours    PRN Inpatient Medications  albuterol/ipratropium for Nebulization 3 milliLiter(s) Nebulizer every 6 hours PRN    REVIEW OF SYSTEMS  --------------------------------------------------------------------------------  Gen: No fevers/chills  Respiratory: No dyspnea, cough  CV: No chest pain  GI: No abdominal pain, diarrhea  : No dysuria, hematuria  MSK: No  edema    All other systems were reviewed and are negative, except as noted.    VITALS/PHYSICAL EXAM  --------------------------------------------------------------------------------  T(C): 37.2 (02-03-22 @ 12:00), Max: 37.2 (02-03-22 @ 08:00)  HR: 90 (02-03-22 @ 12:00) (84 - 100)  BP: 122/48 (02-03-22 @ 12:00) (53/32 - 131/90)  RR: 20 (02-03-22 @ 12:00) (13 - 26)  SpO2: 99% (02-03-22 @ 12:00) (98% - 100%)  Wt(kg): --  Height (cm): 170.2 (02-03-22 @ 02:23)  Weight (kg): 90 (02-03-22 @ 02:23)  BMI (kg/m2): 31.1 (02-03-22 @ 02:23)  BSA (m2): 2.02 (02-03-22 @ 02:23)    02-02-22 @ 07:01  -  02-03-22 @ 07:00  --------------------------------------------------------  IN: 222.6 mL / OUT: 0 mL / NET: 222.6 mL    02-03-22 @ 07:01  -  02-03-22 @ 13:13  --------------------------------------------------------  IN: 330 mL / OUT: 0 mL / NET: 330 mL    Physical Exam:  	Gen: NAD, elderly male   	HEENT: MMM  	Pulm: CTA B/L, no crackles  	CV: S1S2  	Abd: Soft, +BS   	Ext: No LE edema B/L  	Neuro: Awake and alert   	Skin: Warm and dry    LABS/STUDIES  --------------------------------------------------------------------------------              9.5    17.55 >-----------<  140      [02-03-22 @ 09:22]              27.5     129  |  93  |  146  ----------------------------<  276      [02-03-22 @ 09:22]  4.4   |  10  |  2.54        Ca     8.4     [02-03-22 @ 09:22]      Mg     1.60     [02-03-22 @ 09:26]      Phos  3.2     [02-03-22 @ 09:26]    TPro  5.7  /  Alb  2.9  /  TBili  1.5  /  DBili  x   /  AST  66  /  ALT  12  /  AlkPhos  71  [02-03-22 @ 09:22]    PT/INR: PT 12.0 , INR 1.05       [02-02-22 @ 14:35]  PTT: 25.2       [02-03-22 @ 02:54]          [02-03-22 @ 09:26]    Creatinine Trend:  SCr 2.54 [02-03 @ 09:22]  SCr 2.51 [02-03 @ 02:54]  SCr 3.25 [02-02 @ 14:35]    Urinalysis - [02-03-22 @ 05:53]      Color Light Yellow / Appearance Slightly Turbid / SG 1.016 / pH 5.0      Gluc Trace / Ketone Small  / Bili Negative / Urobili <2 mg/dL       Blood Small / Protein Negative / Leuk Est Large / Nitrite Negative      RBC 0 / WBC 49 / Hyaline 2 / Gran  / Sq Epi  / Non Sq Epi Moderate / Bacteria Few [facility] DIVISION OF KIDNEY DISEASES AND HYPERTENSION -- [phone]  -- INITIAL CONSULT NOTE  --------------------------------------------------------------------------------  HPI: 76-year-old male with PMH of HFrEF, CAD, COPD, hypertension and DM 2 presented to [facility] from nursing home for abdominal pain and coffee ground emesis. Admitted to MICU for UGIB. Noted to be hypotensive, blood pressure was as low as 55/32, started on Levophed. Initial Hb also low 5.6, received 3 units pRBC transfusion. Nephrology consulted for DUSTIN. On review of labs on [facility]/[place], patient noted to have Scr of 3.25 on admission on 2/22, then improved to 2.54 today. No prior labs available.     Patient was seen and examined at bedside. Reported feeling okay. He is currently AAOx3. Denies CP or SOB.     PAST HISTORY  --------------------------------------------------------------------------------  PAST MEDICAL & SURGICAL HISTORY:  Cigarette Smoker    Essential Hypertension    CHF (Congestive Heart Failure)    Diabetes Mellitus    CAD (Coronary Atherosclerotic Disease)    Hypercholesteremia    CABG (Coronary Artery Bypass Graft)  Dec 2010    History of Appendectomy    CABG (Coronary Artery Bypass Graft)    FAMILY HISTORY:    PAST SOCIAL HISTORY:    ALLERGIES & MEDICATIONS  --------------------------------------------------------------------------------  Allergies    No Known Allergies    Intolerances    Standing Inpatient Medications  aspirin enteric coated 81 milliGRAM(s) Oral daily  atorvastatin 40 milliGRAM(s) Oral daily  budesonide  80 MICROgram(s)/formoterol 4.5 MICROgram(s) Inhaler 2 Puff(s) Inhalation two times a day  chlorhexidine 4% Liquid 1 Application(s) Topical <User Schedule>  dextrose 40% Gel 15 Gram(s) Oral once  dextrose 5%. 1000 milliLiter(s) IV Continuous <Continuous>  dextrose 5%. 1000 milliLiter(s) IV Continuous <Continuous>  dextrose 50% Injectable 25 Gram(s) IV Push once  dextrose 50% Injectable 12.5 Gram(s) IV Push once  dextrose 50% Injectable 25 Gram(s) IV Push once  glucagon  Injectable 1 milliGRAM(s) IntraMuscular once  insulin lispro (ADMELOG) corrective regimen sliding scale   SubCutaneous three times a day before meals  insulin lispro (ADMELOG) corrective regimen sliding scale   SubCutaneous at bedtime  norepinephrine Infusion 0.05 MICROgram(s)/kG/Min IV Continuous <Continuous>  pantoprazole Infusion 8 mG/Hr IV Continuous <Continuous>  piperacillin/tazobactam IVPB.. 3.375 Gram(s) IV Intermittent every 12 hours    PRN Inpatient Medications  albuterol/ipratropium for Nebulization 3 milliLiter(s) Nebulizer every 6 hours PRN    REVIEW OF SYSTEMS  --------------------------------------------------------------------------------  Gen: No fevers/chills  Respiratory: No dyspnea, cough  CV: No chest pain  GI: No abdominal pain, diarrhea  : No dysuria, hematuria  MSK: No  edema    All other systems were reviewed and are negative, except as noted.    VITALS/PHYSICAL EXAM  --------------------------------------------------------------------------------  T(C): 37.2 (02-03-22 @ 12:00), Max: 37.2 (02-03-22 @ 08:00)  HR: 90 (02-03-22 @ 12:00) (84 - 100)  BP: 122/48 (02-03-22 @ 12:00) (53/32 - 131/90)  RR: 20 (02-03-22 @ 12:00) (13 - 26)  SpO2: 99% (02-03-22 @ 12:00) (98% - 100%)  Wt(kg): --  Height (cm): 170.2 (02-03-22 @ 02:23)  Weight (kg): 90 (02-03-22 @ 02:23)  BMI (kg/m2): 31.1 (02-03-22 @ 02:23)  BSA (m2): 2.02 (02-03-22 @ 02:23)    02-02-22 @ 07:01  -  02-03-22 @ 07:00  --------------------------------------------------------  IN: 222.6 mL / OUT: 0 mL / NET: 222.6 mL    02-03-22 @ 07:01  -  02-03-22 @ 13:13  --------------------------------------------------------  IN: 330 mL / OUT: 0 mL / NET: 330 mL    Physical Exam:  	Gen: NAD, elderly male   	HEENT: MMM  	Pulm: CTA B/L, no crackles  	CV: S1S2  	Abd: Soft, +BS   	Ext: No LE edema B/L  	Neuro: Awake and alert   	Skin: Warm and dry    LABS/STUDIES  --------------------------------------------------------------------------------              9.5    17.55 >-----------<  140      [02-03-22 @ 09:22]              27.5     129  |  93  |  146  ----------------------------<  276      [02-03-22 @ 09:22]  4.4   |  10  |  2.54        Ca     8.4     [02-03-22 @ 09:22]      Mg     1.60     [02-03-22 @ 09:26]      Phos  3.2     [02-03-22 @ 09:26]    TPro  5.7  /  Alb  2.9  /  TBili  1.5  /  DBili  x   /  AST  66  /  ALT  12  /  AlkPhos  71  [02-03-22 @ 09:22]          [02-03-22 @ 09:26]    Creatinine Trend:  SCr 2.54 [02-03 @ 09:22]  SCr 2.51 [02-03 @ 02:54]  SCr 3.25 [02-02 @ 14:35]    Urinalysis - [02-03-22 @ 05:53]      Color Light Yellow / Appearance Slightly Turbid / SG 1.016 / pH 5.0      Gluc Trace / Ketone Small  / Bili Negative / Urobili <2 mg/dL       Blood Small / Protein Negative / Leuk Est Large / Nitrite Negative      RBC 0 / WBC 49 / Hyaline 2 / Gran  / Sq Epi  / Non Sq Epi Moderate / Bacteria Few [facility] DIVISION OF KIDNEY DISEASES AND HYPERTENSION -- [phone]  -- INITIAL CONSULT NOTE  --------------------------------------------------------------------------------  HPI: 76-year-old male with PMH of HFrEF, CAD, COPD, HTN and DM type 2 presented to [facility] from nursing home for abdominal pain and coffee ground emesis. Admitted to MICU for UGIB. Noted to be hypotensive, blood pressure was as low as 55/32, started on Levophed. Initial Hb also low 5.6, received 3 units pRBC transfusion. Nephrology consulted for DUSTIN. On review of labs on [facility]/[place], patient noted to have Scr of 3.25 on admission on 2/22, then improved to 2.54 today. No prior labs available.     Patient was seen and examined at bedside. Reported feeling okay. He is currently AAOx3. Denies CP or SOB.     PAST HISTORY  --------------------------------------------------------------------------------  PAST MEDICAL & SURGICAL HISTORY:  Cigarette Smoker    Essential Hypertension    CHF (Congestive Heart Failure)    Diabetes Mellitus    CAD (Coronary Atherosclerotic Disease)    Hypercholesteremia    CABG (Coronary Artery Bypass Graft)  Dec 2010    History of Appendectomy    CABG (Coronary Artery Bypass Graft)    FAMILY HISTORY:    PAST SOCIAL HISTORY:    ALLERGIES & MEDICATIONS  --------------------------------------------------------------------------------  Allergies    No Known Allergies    Intolerances    Standing Inpatient Medications  aspirin enteric coated 81 milliGRAM(s) Oral daily  atorvastatin 40 milliGRAM(s) Oral daily  budesonide  80 MICROgram(s)/formoterol 4.5 MICROgram(s) Inhaler 2 Puff(s) Inhalation two times a day  chlorhexidine 4% Liquid 1 Application(s) Topical <User Schedule>  dextrose 40% Gel 15 Gram(s) Oral once  dextrose 5%. 1000 milliLiter(s) IV Continuous <Continuous>  dextrose 5%. 1000 milliLiter(s) IV Continuous <Continuous>  dextrose 50% Injectable 25 Gram(s) IV Push once  dextrose 50% Injectable 12.5 Gram(s) IV Push once  dextrose 50% Injectable 25 Gram(s) IV Push once  glucagon  Injectable 1 milliGRAM(s) IntraMuscular once  insulin lispro (ADMELOG) corrective regimen sliding scale   SubCutaneous three times a day before meals  insulin lispro (ADMELOG) corrective regimen sliding scale   SubCutaneous at bedtime  norepinephrine Infusion 0.05 MICROgram(s)/kG/Min IV Continuous <Continuous>  pantoprazole Infusion 8 mG/Hr IV Continuous <Continuous>  piperacillin/tazobactam IVPB.. 3.375 Gram(s) IV Intermittent every 12 hours    PRN Inpatient Medications  albuterol/ipratropium for Nebulization 3 milliLiter(s) Nebulizer every 6 hours PRN    REVIEW OF SYSTEMS  --------------------------------------------------------------------------------  Gen: No fevers/chills  Respiratory: No dyspnea, cough  CV: No chest pain  GI: No abdominal pain, diarrhea  : No dysuria, hematuria  MSK: No  edema    All other systems were reviewed and are negative, except as noted.    VITALS/PHYSICAL EXAM  --------------------------------------------------------------------------------  T(C): 37.2 (02-03-22 @ 12:00), Max: 37.2 (02-03-22 @ 08:00)  HR: 90 (02-03-22 @ 12:00) (84 - 100)  BP: 122/48 (02-03-22 @ 12:00) (53/32 - 131/90)  RR: 20 (02-03-22 @ 12:00) (13 - 26)  SpO2: 99% (02-03-22 @ 12:00) (98% - 100%)  Wt(kg): --  Height (cm): 170.2 (02-03-22 @ 02:23)  Weight (kg): 90 (02-03-22 @ 02:23)  BMI (kg/m2): 31.1 (02-03-22 @ 02:23)  BSA (m2): 2.02 (02-03-22 @ 02:23)    02-02-22 @ 07:01  -  02-03-22 @ 07:00  --------------------------------------------------------  IN: 222.6 mL / OUT: 0 mL / NET: 222.6 mL    02-03-22 @ 07:01  -  02-03-22 @ 13:13  --------------------------------------------------------  IN: 330 mL / OUT: 0 mL / NET: 330 mL    Physical Exam:  	Gen: NAD, elderly male   	HEENT: MMM  	Pulm: CTA B/L, no crackles  	CV: S1S2+  	Abd: Soft, +BS   	Ext: No LE edema B/L  	Neuro: Awake and alert   	Skin: Warm and dry    LABS/STUDIES  --------------------------------------------------------------------------------              9.5    17.55 >-----------<  140      [02-03-22 @ 09:22]              27.5     129  |  93  |  146  ----------------------------<  276      [02-03-22 @ 09:22]  4.4   |  10  |  2.54        Ca     8.4     [02-03-22 @ 09:22]      Mg     1.60     [02-03-22 @ 09:26]      Phos  3.2     [02-03-22 @ 09:26]    TPro  5.7  /  Alb  2.9  /  TBili  1.5  /  DBili  x   /  AST  66  /  ALT  12  /  AlkPhos  71  [02-03-22 @ 09:22]          [02-03-22 @ 09:26]    Creatinine Trend:  SCr 2.54 [02-03 @ 09:22]  SCr 2.51 [02-03 @ 02:54]  SCr 3.25 [02-02 @ 14:35]    Urinalysis - [02-03-22 @ 05:53]      Color Light Yellow / Appearance Slightly Turbid / SG 1.016 / pH 5.0      Gluc Trace / Ketone Small  / Bili Negative / Urobili <2 mg/dL       Blood Small / Protein Negative / Leuk Est Large / Nitrite Negative      RBC 0 / WBC 49 / Hyaline 2 / Gran  / Sq Epi  / Non Sq Epi Moderate / Bacteria Few

## 2022-02-03 NOTE — PROGRESS NOTE ADULT - SUBJECTIVE AND OBJECTIVE BOX
INTERVAL HPI/OVERNIGHT EVENTS:    SUBJECTIVE: Patient seen and examined at bedside.       VITAL SIGNS:  ICU Vital Signs Last 24 Hrs  T(C): 37.2 (2022 12:00), Max: 37.2 (2022 08:00)  T(F): 98.9 (2022 12:00), Max: 98.9 (2022 08:00)  HR: 90 (:00) (84 - 100)  BP: 122/48 (2022 12:00) (53/32 - 131/90)  BP(mean): 62 (2022 12:00) (40 - 100)  ABP: --  ABP(mean): --  RR: 20 (2022 12:00) (13 - 26)  SpO2: 99% (:) (98% - 100%)      Plateau pressure:   P/F ratio:      @ 07:01  -   @ 07:00  --------------------------------------------------------  IN: 222.6 mL / OUT: 0 mL / NET: 222.6 mL     @ 07:01  -  -03 @ 14:20  --------------------------------------------------------  IN: 330 mL / OUT: 0 mL / NET: 330 mL      CAPILLARY BLOOD GLUCOSE      POCT Blood Glucose.: 301 mg/dL (2022 11:37)      PHYSICAL EXAM:    Gen: NAD, +tired-appearing  HEENT: NCAT, no conjunctival injection, no scleral icterus, +dry oral mucosa  Neck: no JVD, supple, no LAD, no thyromegaly  Resp: +LFNC, normal WOB at rest, CTAB anteriorly  CV: RRR, S1 and S2, no murmurs, no rubs, no gallops, 2+ radial pulses  Abd: nondistended, bowel sounds, soft, nontender, no rebound, no involuntary guarding, no organomegaly  Ext: no lower extremity edema  Neuro: alert, oriented x4  Access: 2 PIV 18g, 1 arrow LUE    MEDICATIONS:  MEDICATIONS  (STANDING):  aspirin enteric coated 81 milliGRAM(s) Oral daily  atorvastatin 40 milliGRAM(s) Oral daily  budesonide  80 MICROgram(s)/formoterol 4.5 MICROgram(s) Inhaler 2 Puff(s) Inhalation two times a day  chlorhexidine 4% Liquid 1 Application(s) Topical <User Schedule>  dextrose 40% Gel 15 Gram(s) Oral once  dextrose 5%. 1000 milliLiter(s) (50 mL/Hr) IV Continuous <Continuous>  dextrose 5%. 1000 milliLiter(s) (100 mL/Hr) IV Continuous <Continuous>  dextrose 50% Injectable 25 Gram(s) IV Push once  dextrose 50% Injectable 12.5 Gram(s) IV Push once  dextrose 50% Injectable 25 Gram(s) IV Push once  glucagon  Injectable 1 milliGRAM(s) IntraMuscular once  insulin lispro (ADMELOG) corrective regimen sliding scale   SubCutaneous three times a day before meals  insulin lispro (ADMELOG) corrective regimen sliding scale   SubCutaneous at bedtime  norepinephrine Infusion 0.05 MICROgram(s)/kG/Min (8.68 mL/Hr) IV Continuous <Continuous>  pantoprazole Infusion 8 mG/Hr (10 mL/Hr) IV Continuous <Continuous>  piperacillin/tazobactam IVPB.. 3.375 Gram(s) IV Intermittent every 12 hours    MEDICATIONS  (PRN):  albuterol/ipratropium for Nebulization 3 milliLiter(s) Nebulizer every 6 hours PRN Shortness of Breath      ALLERGIES:  Allergies    No Known Allergies    Intolerances        LABS:                        9.5    17.55 )-----------( 140      ( 2022 09:22 )             27.5     02-03    129<L>  |  93<L>  |  146<H>  ----------------------------<  276<H>  4.4   |  10<LL>  |  2.54<H>    Ca    8.4      2022 09:22  Phos  3.2     02-03  Mg     1.60     02-03    TPro  5.7<L>  /  Alb  2.9<L>  /  TBili  1.5<H>  /  DBili  x   /  AST  66<H>  /  ALT  12  /  AlkPhos  71  -    PT/INR - ( 2022 14:35 )   PT: 12.0 sec;   INR: 1.05 ratio         PTT - ( 2022 02:54 )  PTT:25.2 sec  Urinalysis Basic - ( 2022 05:53 )    Color: Light Yellow / Appearance: Slightly Turbid / S.016 / pH: x  Gluc: x / Ketone: Small  / Bili: Negative / Urobili: <2 mg/dL   Blood: x / Protein: Negative / Nitrite: Negative   Leuk Esterase: Large / RBC: 0 /HPF / WBC 49 /HPF   Sq Epi: x / Non Sq Epi: Moderate / Bacteria: Few        RADIOLOGY & ADDITIONAL TESTS: Reviewed.   INTERVAL HPI/OVERNIGHT EVENTS:  patient received 3U PRBC overnight   hgb stabilized   seen by cardiology and GI and renal       SUBJECTIVE: Patient seen and examined at bedside.       VITAL SIGNS:  ICU Vital Signs Last 24 Hrs  T(C): 37.2 (2022 12:00), Max: 37.2 (2022 08:00)  T(F): 98.9 (2022 12:00), Max: 98.9 (2022 08:00)  HR: 90 (:00) (84 - 100)  BP: 122/48 (2022 12:00) (53/32 - 131/90)  BP(mean): 62 (:00) (40 - 100)  ABP: --  ABP(mean): --  RR: 20 (2022 12:00) (13 - 26)  SpO2: 99% (:00) (98% - 100%)      Plateau pressure:   P/F ratio:      @ 07:01  -  -03 @ 07:00  --------------------------------------------------------  IN: 222.6 mL / OUT: 0 mL / NET: 222.6 mL     @ 07:01  -  02-03 @ 14:20  --------------------------------------------------------  IN: 330 mL / OUT: 0 mL / NET: 330 mL      CAPILLARY BLOOD GLUCOSE      POCT Blood Glucose.: 301 mg/dL (2022 11:37)      PHYSICAL EXAM:    Gen: NAD, +tired-appearing  HEENT: NCAT, no conjunctival injection, no scleral icterus, +dry oral mucosa  Neck: no JVD, supple, no LAD, no thyromegaly  Resp: +LFNC, normal WOB at rest, CTAB anteriorly  CV: RRR, S1 and S2, no murmurs, no rubs, no gallops, 2+ radial pulses  Abd: nondistended, bowel sounds, soft, nontender, no rebound, no involuntary guarding, no organomegaly  Ext: no lower extremity edema  Neuro: alert, oriented x4  Access: 2 PIV 18g, 1 arrow LUE    MEDICATIONS:  MEDICATIONS  (STANDING):  aspirin enteric coated 81 milliGRAM(s) Oral daily  atorvastatin 40 milliGRAM(s) Oral daily  budesonide  80 MICROgram(s)/formoterol 4.5 MICROgram(s) Inhaler 2 Puff(s) Inhalation two times a day  chlorhexidine 4% Liquid 1 Application(s) Topical <User Schedule>  dextrose 40% Gel 15 Gram(s) Oral once  dextrose 5%. 1000 milliLiter(s) (50 mL/Hr) IV Continuous <Continuous>  dextrose 5%. 1000 milliLiter(s) (100 mL/Hr) IV Continuous <Continuous>  dextrose 50% Injectable 25 Gram(s) IV Push once  dextrose 50% Injectable 12.5 Gram(s) IV Push once  dextrose 50% Injectable 25 Gram(s) IV Push once  glucagon  Injectable 1 milliGRAM(s) IntraMuscular once  insulin lispro (ADMELOG) corrective regimen sliding scale   SubCutaneous three times a day before meals  insulin lispro (ADMELOG) corrective regimen sliding scale   SubCutaneous at bedtime  norepinephrine Infusion 0.05 MICROgram(s)/kG/Min (8.68 mL/Hr) IV Continuous <Continuous>  pantoprazole Infusion 8 mG/Hr (10 mL/Hr) IV Continuous <Continuous>  piperacillin/tazobactam IVPB.. 3.375 Gram(s) IV Intermittent every 12 hours    MEDICATIONS  (PRN):  albuterol/ipratropium for Nebulization 3 milliLiter(s) Nebulizer every 6 hours PRN Shortness of Breath      ALLERGIES:  Allergies    No Known Allergies    Intolerances        LABS:                        9.5    17.55 )-----------( 140      ( 2022 09:22 )             27.5     02-03    129<L>  |  93<L>  |  146<H>  ----------------------------<  276<H>  4.4   |  10<LL>  |  2.54<H>    Ca    8.4      2022 09:22  Phos  3.2     02-03  Mg     1.60         TPro  5.7<L>  /  Alb  2.9<L>  /  TBili  1.5<H>  /  DBili  x   /  AST  66<H>  /  ALT  12  /  AlkPhos  71      PT/INR - ( 2022 14:35 )   PT: 12.0 sec;   INR: 1.05 ratio         PTT - ( 2022 02:54 )  PTT:25.2 sec  Urinalysis Basic - ( 2022 05:53 )    Color: Light Yellow / Appearance: Slightly Turbid / S.016 / pH: x  Gluc: x / Ketone: Small  / Bili: Negative / Urobili: <2 mg/dL   Blood: x / Protein: Negative / Nitrite: Negative   Leuk Esterase: Large / RBC: 0 /HPF / WBC 49 /HPF   Sq Epi: x / Non Sq Epi: Moderate / Bacteria: Few        RADIOLOGY & ADDITIONAL TESTS: Reviewed.

## 2022-02-03 NOTE — PROGRESS NOTE ADULT - ASSESSMENT
76M with PMHx HFrEF (TTE 8/2011 showed moderate segmental LVSD), CAD s/p CABG, HTN, DM.  Presented from [facility] with abd pain and coffee ground emesis. Found to be hypotensive, anemic to Hb 5.4, with DUSTIN, and with c/f UGIB.  Course now complicated worsening acidosis with high AG     NEURO/PSYCH    - holding home citalopram    SKIN/MSK    GI/NUTRITION    #Coffee ground emesis  Likely UGIB. BUN 165H.  - pantoprazole infusion s/p 80mg IVP  - holding off on EHD   -NPO currently     #Abd pain  Likely 2/2 UGIB. POCUS showed cholelithiasis.      #GERD  - holding home omeprazole    #Constipation  - holding home senna    #Diet: NPO    ENDO    #DM  - ISS  - holding home metformin    NEPHRO//METABOLIC/VOLUME    #DUSTIN  Likely prerenal. BUN 165H.  - Hung  - s/p IVF    #HypoNa  Normal TSH and fT4.    #HAGMA   -low bicarb   possible DKA/ uremic acidosis   will give 1AMP bicarb, treat with insulin gtt for DKA       HEME/ONC    #Anemia  Likely 2/2 UGIB.  - Hb goal >=8  - s/p pRBCs  - CBC Q8Hr   - maintain active T&S    #Thrombocytopenia  Likely reactive.  - CBC Q8hr     #VTE ppx: SCDs    CV    #Shock  Possibly 2/2 infection, UGIB,   - norepi  - s/p IVF  - empiric Zosyn  - holding home carvedilol    #HFrEF, CAD, troponinemia   Potential demand ischemia. Last documented LVEF in [employer] was 27%. HsenTnT 1320H.  - TTE w/ no comment on EF.   -cards says troponin is likely demand   -no acute intervention per cardiology now     PULM    -restart home symbicort   -saturating well on RA     ETHICS    - full code    ID    #Neutrophilic leukocytosis  Likely 2/2 infection. Procal 3.21H.  - s/p IVF  - empiric Zosyn  - f/u ucx and bcx  -will get CXR

## 2022-02-03 NOTE — CONSULT NOTE ADULT - ASSESSMENT
Pt. with hemodynamically mediated DUSTIN in setting of hemorrhagic shock  Pt. with hemodynamically mediated DUSTIN in setting of hemorrhagic shock.

## 2022-02-03 NOTE — CONSULT NOTE ADULT - PROBLEM SELECTOR RECOMMENDATION 9
Pt with hemodynamically mediated DUSTIN in setting of hemorrhagic shock. Exact duration of DUSTIN however unknown. Noted to have Scr of 3.25 on admission on 2/22, then improved to 2.54 today. Noted to be hypotensive, blood pressure was as low as 55/32, started on Levophed. Initial Hb also low 5.6. UA with pyuria and positive leuk est. Currently on IV vasopressor. Send urine electrolytes and spot urine TP/CR. Optimize hemodynamics. Will need to consider HD if renal failure continues to worsen. Monitor labs and urine output. Avoid NSAIDs, ACEI/ARBS, RCA and nephrotoxins. Dose medications as per eGFR.    If any questions, please feel free to contact me     Gutierrez Bateman  Nephrology Fellow  [facility] Pager: [phone]  [facility] Pager: [phone] Pt with hemodynamically mediated DUSTIN in setting of hemorrhagic shock. Exact duration of DUSTIN however unknown. Noted to have Scr of 3.25 on admission on 2/22, then improved to 2.54 today. Noted to be hypotensive, blood pressure was as low as 55/32, started on Levophed. Initial Hb also low 5.6. UA with pyuria and positive leuk est. Currently on IV vasopressor. Send urine electrolytes and spot urine TP/CR. Optimize hemodynamics. Will need to consider HD if renal failure continues to worsen. Monitor labs and urine output. Avoid NSAIDs, ACEI/ARBS, RCA and nephrotoxins. Dose medications as per eGFR. Pt. with hemodynamically mediated DUSTIN in setting of hemorrhagic shock. Exact duration of DUSTIN however unknown. Noted to have Scr of 3.25 on admission on 2/22, then improved to 2.54 today. Noted to be hypotensive, blood pressure was as low as 55/32, started on Levophed. Initial Hb also low 5.6. UA with pyuria and positive LE. Currently on IV vasopressor. Send urine electrolytes and spot urine TP/CR. Optimize hemodynamics. Will need to consider HD if renal failure continues to worsen. Monitor labs and urine output. Avoid NSAIDs, ACEI/ARBS, RCA and nephrotoxins. Dose medications as per eGFR.

## 2022-02-04 DIAGNOSIS — I21.4 NON-ST ELEVATION (NSTEMI) MYOCARDIAL INFARCTION: ICD-10-CM

## 2022-02-04 LAB
AMMONIA BLD-MCNC: 15 UMOL/L — SIGNIFICANT CHANGE UP (ref 11–55)
ANION GAP SERPL CALC-SCNC: 13 MMOL/L — SIGNIFICANT CHANGE UP (ref 7–14)
ANION GAP SERPL CALC-SCNC: 15 MMOL/L — HIGH (ref 7–14)
ANION GAP SERPL CALC-SCNC: 15 MMOL/L — HIGH (ref 7–14)
ANION GAP SERPL CALC-SCNC: 18 MMOL/L — HIGH (ref 7–14)
APTT BLD: 26.2 SEC — LOW (ref 27–36.3)
B-OH-BUTYR SERPL-SCNC: 1 MMOL/L — HIGH (ref 0–0.4)
BASE EXCESS BLDV CALC-SCNC: -2.2 MMOL/L — LOW (ref -2–3)
BASOPHILS # BLD AUTO: 0.02 K/UL — SIGNIFICANT CHANGE UP (ref 0–0.2)
BASOPHILS NFR BLD AUTO: 0.1 % — SIGNIFICANT CHANGE UP (ref 0–2)
BLOOD GAS ARTERIAL COMPREHENSIVE RESULT: SIGNIFICANT CHANGE UP
BUN SERPL-MCNC: 106 MG/DL — HIGH (ref 7–23)
BUN SERPL-MCNC: 69 MG/DL — HIGH (ref 7–23)
BUN SERPL-MCNC: 87 MG/DL — HIGH (ref 7–23)
BUN SERPL-MCNC: 93 MG/DL — HIGH (ref 7–23)
CA-I SERPL-SCNC: 1.25 MMOL/L — SIGNIFICANT CHANGE UP (ref 1.15–1.33)
CALCIUM SERPL-MCNC: 8.5 MG/DL — SIGNIFICANT CHANGE UP (ref 8.4–10.5)
CALCIUM SERPL-MCNC: 8.6 MG/DL — SIGNIFICANT CHANGE UP (ref 8.4–10.5)
CALCIUM SERPL-MCNC: 8.7 MG/DL — SIGNIFICANT CHANGE UP (ref 8.4–10.5)
CALCIUM SERPL-MCNC: 8.7 MG/DL — SIGNIFICANT CHANGE UP (ref 8.4–10.5)
CHLORIDE BLDV-SCNC: 106 MMOL/L — SIGNIFICANT CHANGE UP (ref 96–108)
CHLORIDE SERPL-SCNC: 103 MMOL/L — SIGNIFICANT CHANGE UP (ref 98–107)
CHLORIDE SERPL-SCNC: 106 MMOL/L — SIGNIFICANT CHANGE UP (ref 98–107)
CHLORIDE SERPL-SCNC: 106 MMOL/L — SIGNIFICANT CHANGE UP (ref 98–107)
CHLORIDE SERPL-SCNC: 111 MMOL/L — HIGH (ref 98–107)
CO2 BLDV-SCNC: 23.4 MMOL/L — SIGNIFICANT CHANGE UP (ref 22–26)
CO2 SERPL-SCNC: 17 MMOL/L — LOW (ref 22–31)
CO2 SERPL-SCNC: 19 MMOL/L — LOW (ref 22–31)
CO2 SERPL-SCNC: 19 MMOL/L — LOW (ref 22–31)
CO2 SERPL-SCNC: 21 MMOL/L — LOW (ref 22–31)
CREAT SERPL-MCNC: 1.5 MG/DL — HIGH (ref 0.5–1.3)
CREAT SERPL-MCNC: 1.71 MG/DL — HIGH (ref 0.5–1.3)
CREAT SERPL-MCNC: 1.81 MG/DL — HIGH (ref 0.5–1.3)
CREAT SERPL-MCNC: 1.93 MG/DL — HIGH (ref 0.5–1.3)
EOSINOPHIL # BLD AUTO: 0.01 K/UL — SIGNIFICANT CHANGE UP (ref 0–0.5)
EOSINOPHIL NFR BLD AUTO: 0.1 % — SIGNIFICANT CHANGE UP (ref 0–6)
GAS PNL BLDV: 138 MMOL/L — SIGNIFICANT CHANGE UP (ref 136–145)
GAS PNL BLDV: SIGNIFICANT CHANGE UP
GLUCOSE BLDC GLUCOMTR-MCNC: 101 MG/DL — HIGH (ref 70–99)
GLUCOSE BLDC GLUCOMTR-MCNC: 118 MG/DL — HIGH (ref 70–99)
GLUCOSE BLDC GLUCOMTR-MCNC: 133 MG/DL — HIGH (ref 70–99)
GLUCOSE BLDC GLUCOMTR-MCNC: 173 MG/DL — HIGH (ref 70–99)
GLUCOSE BLDC GLUCOMTR-MCNC: 181 MG/DL — HIGH (ref 70–99)
GLUCOSE BLDC GLUCOMTR-MCNC: 196 MG/DL — HIGH (ref 70–99)
GLUCOSE BLDC GLUCOMTR-MCNC: 205 MG/DL — HIGH (ref 70–99)
GLUCOSE BLDC GLUCOMTR-MCNC: 206 MG/DL — HIGH (ref 70–99)
GLUCOSE BLDC GLUCOMTR-MCNC: 208 MG/DL — HIGH (ref 70–99)
GLUCOSE BLDC GLUCOMTR-MCNC: 210 MG/DL — HIGH (ref 70–99)
GLUCOSE BLDC GLUCOMTR-MCNC: 222 MG/DL — HIGH (ref 70–99)
GLUCOSE BLDC GLUCOMTR-MCNC: 225 MG/DL — HIGH (ref 70–99)
GLUCOSE BLDC GLUCOMTR-MCNC: 231 MG/DL — HIGH (ref 70–99)
GLUCOSE BLDC GLUCOMTR-MCNC: 231 MG/DL — HIGH (ref 70–99)
GLUCOSE BLDV-MCNC: 222 MG/DL — HIGH (ref 70–99)
GLUCOSE SERPL-MCNC: 120 MG/DL — HIGH (ref 70–99)
GLUCOSE SERPL-MCNC: 204 MG/DL — HIGH (ref 70–99)
GLUCOSE SERPL-MCNC: 222 MG/DL — HIGH (ref 70–99)
GLUCOSE SERPL-MCNC: 251 MG/DL — HIGH (ref 70–99)
HCO3 BLDV-SCNC: 22 MMOL/L — SIGNIFICANT CHANGE UP (ref 22–29)
HCT VFR BLD CALC: 26.3 % — LOW (ref 39–50)
HCT VFR BLDA CALC: 28 % — LOW (ref 39–51)
HGB BLD CALC-MCNC: 9.3 G/DL — LOW (ref 13–17)
HGB BLD-MCNC: 9 G/DL — LOW (ref 13–17)
IANC: 11.17 K/UL — HIGH (ref 1.5–8.5)
IMM GRANULOCYTES NFR BLD AUTO: 2 % — HIGH (ref 0–1.5)
INR BLD: 1.09 RATIO — SIGNIFICANT CHANGE UP (ref 0.88–1.16)
LACTATE BLDV-MCNC: 1.9 MMOL/L — SIGNIFICANT CHANGE UP (ref 0.5–2)
LYMPHOCYTES # BLD AUTO: 1.29 K/UL — SIGNIFICANT CHANGE UP (ref 1–3.3)
LYMPHOCYTES # BLD AUTO: 8.9 % — LOW (ref 13–44)
MAGNESIUM SERPL-MCNC: 1.2 MG/DL — LOW (ref 1.6–2.6)
MAGNESIUM SERPL-MCNC: 1.3 MG/DL — LOW (ref 1.6–2.6)
MAGNESIUM SERPL-MCNC: 1.7 MG/DL — SIGNIFICANT CHANGE UP (ref 1.6–2.6)
MAGNESIUM SERPL-MCNC: 1.7 MG/DL — SIGNIFICANT CHANGE UP (ref 1.6–2.6)
MAGNESIUM SERPL-MCNC: 2.2 MG/DL — SIGNIFICANT CHANGE UP (ref 1.6–2.6)
MCHC RBC-ENTMCNC: 29.2 PG — SIGNIFICANT CHANGE UP (ref 27–34)
MCHC RBC-ENTMCNC: 34.2 GM/DL — SIGNIFICANT CHANGE UP (ref 32–36)
MCV RBC AUTO: 85.4 FL — SIGNIFICANT CHANGE UP (ref 80–100)
MONOCYTES # BLD AUTO: 1.76 K/UL — HIGH (ref 0–0.9)
MONOCYTES NFR BLD AUTO: 12.1 % — SIGNIFICANT CHANGE UP (ref 2–14)
NEUTROPHILS # BLD AUTO: 11.17 K/UL — HIGH (ref 1.8–7.4)
NEUTROPHILS NFR BLD AUTO: 76.8 % — SIGNIFICANT CHANGE UP (ref 43–77)
NRBC # BLD: 0 /100 WBCS — SIGNIFICANT CHANGE UP
NRBC # FLD: 0.04 K/UL — HIGH
PCO2 BLDV: 36 MMHG — LOW (ref 42–55)
PH BLDV: 7.4 — SIGNIFICANT CHANGE UP (ref 7.32–7.43)
PHOSPHATE SERPL-MCNC: 1.3 MG/DL — LOW (ref 2.5–4.5)
PHOSPHATE SERPL-MCNC: 1.4 MG/DL — LOW (ref 2.5–4.5)
PHOSPHATE SERPL-MCNC: 1.5 MG/DL — LOW (ref 2.5–4.5)
PHOSPHATE SERPL-MCNC: 2.6 MG/DL — SIGNIFICANT CHANGE UP (ref 2.5–4.5)
PLATELET # BLD AUTO: 145 K/UL — LOW (ref 150–400)
PO2 BLDV: 47 MMHG — SIGNIFICANT CHANGE UP
POTASSIUM BLDV-SCNC: 3.9 MMOL/L — SIGNIFICANT CHANGE UP (ref 3.5–5.1)
POTASSIUM SERPL-MCNC: 3.8 MMOL/L — SIGNIFICANT CHANGE UP (ref 3.5–5.3)
POTASSIUM SERPL-MCNC: 4 MMOL/L — SIGNIFICANT CHANGE UP (ref 3.5–5.3)
POTASSIUM SERPL-MCNC: 4.1 MMOL/L — SIGNIFICANT CHANGE UP (ref 3.5–5.3)
POTASSIUM SERPL-MCNC: 5.2 MMOL/L — SIGNIFICANT CHANGE UP (ref 3.5–5.3)
POTASSIUM SERPL-SCNC: 3.8 MMOL/L — SIGNIFICANT CHANGE UP (ref 3.5–5.3)
POTASSIUM SERPL-SCNC: 4 MMOL/L — SIGNIFICANT CHANGE UP (ref 3.5–5.3)
POTASSIUM SERPL-SCNC: 4.1 MMOL/L — SIGNIFICANT CHANGE UP (ref 3.5–5.3)
POTASSIUM SERPL-SCNC: 5.2 MMOL/L — SIGNIFICANT CHANGE UP (ref 3.5–5.3)
PROTHROM AB SERPL-ACNC: 12.5 SEC — SIGNIFICANT CHANGE UP (ref 10.6–13.6)
RBC # BLD: 3.08 M/UL — LOW (ref 4.2–5.8)
RBC # FLD: 14.9 % — HIGH (ref 10.3–14.5)
SAO2 % BLDV: 77.1 % — SIGNIFICANT CHANGE UP
SODIUM SERPL-SCNC: 138 MMOL/L — SIGNIFICANT CHANGE UP (ref 135–145)
SODIUM SERPL-SCNC: 140 MMOL/L — SIGNIFICANT CHANGE UP (ref 135–145)
SODIUM SERPL-SCNC: 140 MMOL/L — SIGNIFICANT CHANGE UP (ref 135–145)
SODIUM SERPL-SCNC: 145 MMOL/L — SIGNIFICANT CHANGE UP (ref 135–145)
VANCOMYCIN FLD-MCNC: 14.9 UG/ML — SIGNIFICANT CHANGE UP
WBC # BLD: 14.54 K/UL — HIGH (ref 3.8–10.5)
WBC # FLD AUTO: 14.54 K/UL — HIGH (ref 3.8–10.5)

## 2022-02-04 PROCEDURE — 99232 SBSQ HOSP IP/OBS MODERATE 35: CPT | Mod: GC

## 2022-02-04 PROCEDURE — 99233 SBSQ HOSP IP/OBS HIGH 50: CPT | Mod: GC

## 2022-02-04 RX ORDER — POTASSIUM PHOSPHATE, MONOBASIC POTASSIUM PHOSPHATE, DIBASIC 236; 224 MG/ML; MG/ML
30 INJECTION, SOLUTION INTRAVENOUS ONCE
Refills: 0 | Status: COMPLETED | OUTPATIENT
Start: 2022-02-04 | End: 2022-02-04

## 2022-02-04 RX ORDER — SODIUM CHLORIDE 9 MG/ML
1000 INJECTION, SOLUTION INTRAVENOUS
Refills: 0 | Status: DISCONTINUED | OUTPATIENT
Start: 2022-02-04 | End: 2022-02-05

## 2022-02-04 RX ORDER — SCOPALAMINE 1 MG/3D
1 PATCH, EXTENDED RELEASE TRANSDERMAL ONCE
Refills: 0 | Status: COMPLETED | OUTPATIENT
Start: 2022-02-04 | End: 2022-02-04

## 2022-02-04 RX ORDER — INSULIN LISPRO 100/ML
7 VIAL (ML) SUBCUTANEOUS
Refills: 0 | Status: DISCONTINUED | OUTPATIENT
Start: 2022-02-04 | End: 2022-02-07

## 2022-02-04 RX ORDER — PANTOPRAZOLE SODIUM 20 MG/1
40 TABLET, DELAYED RELEASE ORAL
Refills: 0 | Status: DISCONTINUED | OUTPATIENT
Start: 2022-02-04 | End: 2022-02-07

## 2022-02-04 RX ORDER — MIDODRINE HYDROCHLORIDE 2.5 MG/1
10 TABLET ORAL EVERY 8 HOURS
Refills: 0 | Status: DISCONTINUED | OUTPATIENT
Start: 2022-02-04 | End: 2022-02-05

## 2022-02-04 RX ORDER — INSULIN LISPRO 100/ML
VIAL (ML) SUBCUTANEOUS AT BEDTIME
Refills: 0 | Status: DISCONTINUED | OUTPATIENT
Start: 2022-02-04 | End: 2022-02-18

## 2022-02-04 RX ORDER — INSULIN GLARGINE 100 [IU]/ML
20 INJECTION, SOLUTION SUBCUTANEOUS ONCE
Refills: 0 | Status: COMPLETED | OUTPATIENT
Start: 2022-02-04 | End: 2022-02-04

## 2022-02-04 RX ORDER — VANCOMYCIN HCL 1 G
500 VIAL (EA) INTRAVENOUS ONCE
Refills: 0 | Status: DISCONTINUED | OUTPATIENT
Start: 2022-02-04 | End: 2022-02-04

## 2022-02-04 RX ORDER — MAGNESIUM SULFATE 500 MG/ML
2 VIAL (ML) INJECTION ONCE
Refills: 0 | Status: COMPLETED | OUTPATIENT
Start: 2022-02-04 | End: 2022-02-04

## 2022-02-04 RX ORDER — DEXTROSE 50 % IN WATER 50 %
15 SYRINGE (ML) INTRAVENOUS ONCE
Refills: 0 | Status: DISCONTINUED | OUTPATIENT
Start: 2022-02-04 | End: 2022-02-18

## 2022-02-04 RX ORDER — POTASSIUM PHOSPHATE, MONOBASIC POTASSIUM PHOSPHATE, DIBASIC 236; 224 MG/ML; MG/ML
30 INJECTION, SOLUTION INTRAVENOUS ONCE
Refills: 0 | Status: DISCONTINUED | OUTPATIENT
Start: 2022-02-04 | End: 2022-02-04

## 2022-02-04 RX ORDER — VANCOMYCIN HCL 1 G
1000 VIAL (EA) INTRAVENOUS ONCE
Refills: 0 | Status: DISCONTINUED | OUTPATIENT
Start: 2022-02-04 | End: 2022-02-04

## 2022-02-04 RX ORDER — INSULIN LISPRO 100/ML
VIAL (ML) SUBCUTANEOUS
Refills: 0 | Status: DISCONTINUED | OUTPATIENT
Start: 2022-02-04 | End: 2022-02-18

## 2022-02-04 RX ORDER — ALBUMIN HUMAN 25 %
250 VIAL (ML) INTRAVENOUS ONCE
Refills: 0 | Status: COMPLETED | OUTPATIENT
Start: 2022-02-04 | End: 2022-02-04

## 2022-02-04 RX ADMIN — BUDESONIDE AND FORMOTEROL FUMARATE DIHYDRATE 2 PUFF(S): 160; 4.5 AEROSOL RESPIRATORY (INHALATION) at 10:24

## 2022-02-04 RX ADMIN — SODIUM CHLORIDE 125 MILLILITER(S): 9 INJECTION, SOLUTION INTRAVENOUS at 16:29

## 2022-02-04 RX ADMIN — Medication 25 GRAM(S): at 11:07

## 2022-02-04 RX ADMIN — PIPERACILLIN AND TAZOBACTAM 25 GRAM(S): 4; .5 INJECTION, POWDER, LYOPHILIZED, FOR SOLUTION INTRAVENOUS at 18:49

## 2022-02-04 RX ADMIN — PIPERACILLIN AND TAZOBACTAM 25 GRAM(S): 4; .5 INJECTION, POWDER, LYOPHILIZED, FOR SOLUTION INTRAVENOUS at 03:43

## 2022-02-04 RX ADMIN — MIDODRINE HYDROCHLORIDE 10 MILLIGRAM(S): 2.5 TABLET ORAL at 16:29

## 2022-02-04 RX ADMIN — DEXTROSE MONOHYDRATE, SODIUM CHLORIDE, AND POTASSIUM CHLORIDE 200 MILLILITER(S): 50; .745; 4.5 INJECTION, SOLUTION INTRAVENOUS at 03:46

## 2022-02-04 RX ADMIN — PANTOPRAZOLE SODIUM 40 MILLIGRAM(S): 20 TABLET, DELAYED RELEASE ORAL at 18:48

## 2022-02-04 RX ADMIN — INSULIN GLARGINE 20 UNIT(S): 100 INJECTION, SOLUTION SUBCUTANEOUS at 13:04

## 2022-02-04 RX ADMIN — BUDESONIDE AND FORMOTEROL FUMARATE DIHYDRATE 2 PUFF(S): 160; 4.5 AEROSOL RESPIRATORY (INHALATION) at 20:00

## 2022-02-04 RX ADMIN — Medication 125 MILLILITER(S): at 13:05

## 2022-02-04 RX ADMIN — Medication 7 UNIT(S): at 17:17

## 2022-02-04 RX ADMIN — ATORVASTATIN CALCIUM 40 MILLIGRAM(S): 80 TABLET, FILM COATED ORAL at 15:32

## 2022-02-04 RX ADMIN — Medication 81 MILLIGRAM(S): at 15:33

## 2022-02-04 RX ADMIN — Medication 16.67 GRAM(S): at 03:43

## 2022-02-04 RX ADMIN — POTASSIUM PHOSPHATE, MONOBASIC POTASSIUM PHOSPHATE, DIBASIC 83.33 MILLIMOLE(S): 236; 224 INJECTION, SOLUTION INTRAVENOUS at 08:07

## 2022-02-04 RX ADMIN — MIDODRINE HYDROCHLORIDE 10 MILLIGRAM(S): 2.5 TABLET ORAL at 21:18

## 2022-02-04 RX ADMIN — INSULIN HUMAN 3 UNIT(S)/HR: 100 INJECTION, SOLUTION SUBCUTANEOUS at 03:44

## 2022-02-04 RX ADMIN — Medication 8.68 MICROGRAM(S)/KG/MIN: at 13:06

## 2022-02-04 RX ADMIN — CHLORHEXIDINE GLUCONATE 1 APPLICATION(S): 213 SOLUTION TOPICAL at 10:57

## 2022-02-04 RX ADMIN — SCOPALAMINE 1 PATCH: 1 PATCH, EXTENDED RELEASE TRANSDERMAL at 20:10

## 2022-02-04 RX ADMIN — INSULIN HUMAN 3 UNIT(S)/HR: 100 INJECTION, SOLUTION SUBCUTANEOUS at 11:06

## 2022-02-04 RX ADMIN — SCOPALAMINE 1 PATCH: 1 PATCH, EXTENDED RELEASE TRANSDERMAL at 16:28

## 2022-02-04 RX ADMIN — Medication 8.68 MICROGRAM(S)/KG/MIN: at 03:49

## 2022-02-04 RX ADMIN — Medication 250 MILLIGRAM(S): at 01:04

## 2022-02-04 NOTE — PROGRESS NOTE ADULT - SUBJECTIVE AND OBJECTIVE BOX
INTERVAL HPI/OVERNIGHT EVENTS:  continues on insulin gtt overnight.      SUBJECTIVE: Patient seen and examined at bedside. Patient reports feeling dry. otherwise no other active complaints       VITAL SIGNS:  ICU Vital Signs Last 24 Hrs  T(C): 36.5 (2022 08:00), Max: 37.2 (2022 12:00)  T(F): 97.7 (2022 08:00), Max: 98.9 (2022 12:00)  HR: 97 (2022 09:00) (74 - 100)  BP: 136/69 (2022 09:00) (99/57 - 136/69)  BP(mean): 87 (:00) (62 - 87)  ABP: --  ABP(mean): --  RR: 23 (2022 09:00) (19 - 23)  SpO2: 97% (:00) (95% - 100%)      Plateau pressure:   P/F ratio:     - @ 07:01  -  -04 @ 07:00  --------------------------------------------------------  IN: 3447.3 mL / OUT: 1925 mL / NET: 1522.3 mL    04 @ 07:01  -  02-04 @ 09:11  --------------------------------------------------------  IN: 400 mL / OUT: 200 mL / NET: 200 mL      CAPILLARY BLOOD GLUCOSE      POCT Blood Glucose.: 231 mg/dL (2022 08:05)    ECG:    PHYSICAL EXAM:    Gen: NAD, +tired-appearing  HEENT: NCAT, no conjunctival injection, no scleral icterus, +dry oral mucosa  Neck: no JVD, supple, no LAD, no thyromegaly  Resp: +LFNC, normal WOB at rest, CTAB anteriorly  CV: RRR, S1 and S2, no murmurs, no rubs, no gallops, 2+ radial pulses  Abd: nondistended, bowel sounds, soft, nontender, no rebound, no involuntary guarding, no organomegaly  Ext: no lower extremity edema  Neuro: alert, oriented x4  Access: 2 PIV 18g, 1 arrow LUE    MEDICATIONS:  MEDICATIONS  (STANDING):  aspirin enteric coated 81 milliGRAM(s) Oral daily  atorvastatin 40 milliGRAM(s) Oral daily  budesonide  80 MICROgram(s)/formoterol 4.5 MICROgram(s) Inhaler 2 Puff(s) Inhalation two times a day  chlorhexidine 4% Liquid 1 Application(s) Topical <User Schedule>  dextrose 40% Gel 15 Gram(s) Oral once  dextrose 5% + lactated ringers with potassium chloride 20 mEq/L 1000 milliLiter(s) (200 mL/Hr) IV Continuous <Continuous>  dextrose 5%. 1000 milliLiter(s) (50 mL/Hr) IV Continuous <Continuous>  dextrose 50% Injectable 25 Gram(s) IV Push once  dextrose 50% Injectable 12.5 Gram(s) IV Push once  dextrose 50% Injectable 25 Gram(s) IV Push once  dextrose 50% Injectable 25 Gram(s) IV Push once  dextrose 50% Injectable 12.5 Gram(s) IV Push once  glucagon  Injectable 1 milliGRAM(s) IntraMuscular once  insulin lispro (ADMELOG) corrective regimen sliding scale   SubCutaneous three times a day before meals  insulin lispro (ADMELOG) corrective regimen sliding scale   SubCutaneous at bedtime  insulin regular Infusion 3 Unit(s)/Hr (3 mL/Hr) IV Continuous <Continuous>  magnesium sulfate  IVPB 2 Gram(s) IV Intermittent once  norepinephrine Infusion 0.05 MICROgram(s)/kG/Min (8.68 mL/Hr) IV Continuous <Continuous>  pantoprazole Infusion 8 mG/Hr (10 mL/Hr) IV Continuous <Continuous>  piperacillin/tazobactam IVPB.. 3.375 Gram(s) IV Intermittent every 12 hours    MEDICATIONS  (PRN):      ALLERGIES:  Allergies    No Known Allergies    Intolerances        LABS:                        9.0    14.54 )-----------( 145      ( 2022 02:03 )             26.3     02-04    138  |  103  |  106<H>  ----------------------------<  120<H>  3.8   |  17<L>  |  1.93<H>    Ca    8.7      2022 02:03  Phos  1.4     02-04  Mg     1.20     02-04    TPro  5.7<L>  /  Alb  2.9<L>  /  TBili  1.5<H>  /  DBili  x   /  AST  66<H>  /  ALT  12  /  AlkPhos  71  02-03    PT/INR - ( 2022 02:25 )   PT: 12.5 sec;   INR: 1.09 ratio         PTT - ( 2022 02:25 )  PTT:26.2 sec  Urinalysis Basic - ( 2022 05:53 )    Color: Light Yellow / Appearance: Slightly Turbid / S.016 / pH: x  Gluc: x / Ketone: Small  / Bili: Negative / Urobili: <2 mg/dL   Blood: x / Protein: Negative / Nitrite: Negative   Leuk Esterase: Large / RBC: 0 /HPF / WBC 49 /HPF   Sq Epi: x / Non Sq Epi: Moderate / Bacteria: Few        RADIOLOGY & ADDITIONAL TESTS: Reviewed.

## 2022-02-04 NOTE — DIETITIAN INITIAL EVALUATION ADULT. - ETIOLOGY
In the context of acute illness in setting of altered GI function (UGIB) and DKA requiring NPO status.

## 2022-02-04 NOTE — PROGRESS NOTE ADULT - SUBJECTIVE AND OBJECTIVE BOX
Chief Complaint:  Patient is a 76y old  Male who presents with a chief complaint of Abdominal pain (2022 09:11)      Interval Events: - on insulin gtt for concern for DKA  - hgb remains stable, no overt bleeding      Hospital Medications:  aspirin enteric coated 81 milliGRAM(s) Oral daily  atorvastatin 40 milliGRAM(s) Oral daily  budesonide  80 MICROgram(s)/formoterol 4.5 MICROgram(s) Inhaler 2 Puff(s) Inhalation two times a day  chlorhexidine 4% Liquid 1 Application(s) Topical <User Schedule>  dextrose 40% Gel 15 Gram(s) Oral once  dextrose 5% + lactated ringers with potassium chloride 20 mEq/L 1000 milliLiter(s) IV Continuous <Continuous>  dextrose 5%. 1000 milliLiter(s) IV Continuous <Continuous>  dextrose 50% Injectable 25 Gram(s) IV Push once  dextrose 50% Injectable 12.5 Gram(s) IV Push once  dextrose 50% Injectable 25 Gram(s) IV Push once  dextrose 50% Injectable 25 Gram(s) IV Push once  dextrose 50% Injectable 12.5 Gram(s) IV Push once  glucagon  Injectable 1 milliGRAM(s) IntraMuscular once  insulin lispro (ADMELOG) corrective regimen sliding scale   SubCutaneous three times a day before meals  insulin lispro (ADMELOG) corrective regimen sliding scale   SubCutaneous at bedtime  insulin regular Infusion 3 Unit(s)/Hr IV Continuous <Continuous>  magnesium sulfate  IVPB 2 Gram(s) IV Intermittent once  norepinephrine Infusion 0.05 MICROgram(s)/kG/Min IV Continuous <Continuous>  pantoprazole Infusion 8 mG/Hr IV Continuous <Continuous>  piperacillin/tazobactam IVPB.. 3.375 Gram(s) IV Intermittent every 12 hours      PMHX/PSHX:  Cigarette Smoker    Essential Hypertension    CHF (Congestive Heart Failure)    Diabetes Mellitus    Coronary Artery Disease (CAD) Excluded    CAD (Coronary Atherosclerotic Disease)    Hypercholesteremia    CABG (Coronary Artery Bypass Graft)    History of Appendectomy    CABG (Coronary Artery Bypass Graft)            ROS:     General:  No weight loss, fevers, chills, night sweats, fatigue   Eyes:  No vision changes  ENT:  No sore throat, pain, runny nose  CV:  No chest pain, palpitations, dizziness   Resp:  No SOB, cough, wheezing  GI:  See HPI  :  No burning with urination, hematuria  Muscle:  No pain, weakness  Neuro:  No weakness/tingling, memory problems  Psych:  No fatigue, insomnia, mood problems, depression  Heme:  No easy bruisability  Skin:  No rash, edema      PHYSICAL EXAM:     GENERAL:  Well developed, no distress  HEENT:  NC/AT,  conjunctivae clear, sclera anicteric  CHEST:  Full & symmetric excursion, no increased effort w/ respirations  HEART:  Regular rhythm & rate  ABDOMEN:  Soft, non-tender, non-distended  EXTREMITIES:  no LE  edema  SKIN:  No rash/erythema/ecchymoses/petechiae/wounds/jaundice  NEURO:  Alert, oriented    Vital Signs:  Vital Signs Last 24 Hrs  T(C): 36.5 (2022 08:00), Max: 37.2 (2022 12:00)  T(F): 97.7 (2022 08:00), Max: 98.9 (2022 12:00)  HR: 97 (2022 09:00) (74 - 100)  BP: 136/69 (2022 09:00) (99/57 - 136/69)  BP(mean): 87 (2022 09:00) (62 - 87)  RR: 23 (2022 09:00) (19 - 23)  SpO2: 97% (2022 09:00) (95% - 100%)  Daily     Daily     LABS:                        9.0    14.54 )-----------( 145      ( 2022 02:03 )             26.3     02-04    140  |  106  |  87<H>  ----------------------------<  251<H>  4.1   |  21<L>  |  1.71<H>    Ca    8.5      2022 09:08  Phos  1.5     02-04  Mg     1.70     02-04    TPro  5.7<L>  /  Alb  2.9<L>  /  TBili  1.5<H>  /  DBili  x   /  AST  66<H>  /  ALT  12  /  AlkPhos  71  02-03    LIVER FUNCTIONS - ( 2022 09:22 )  Alb: 2.9 g/dL / Pro: 5.7 g/dL / ALK PHOS: 71 U/L / ALT: 12 U/L / AST: 66 U/L / GGT: x           PT/INR - ( 2022 02:25 )   PT: 12.5 sec;   INR: 1.09 ratio         PTT - ( 2022 02:25 )  PTT:26.2 sec  Urinalysis Basic - ( 2022 05:53 )    Color: Light Yellow / Appearance: Slightly Turbid / S.016 / pH: x  Gluc: x / Ketone: Small  / Bili: Negative / Urobili: <2 mg/dL   Blood: x / Protein: Negative / Nitrite: Negative   Leuk Esterase: Large / RBC: 0 /HPF / WBC 49 /HPF   Sq Epi: x / Non Sq Epi: Moderate / Bacteria: Few      Amylase Serum--      Lipase serum--       [id number]      Imaging:           Interval Events: - on insulin gtt for concern for DKA  - hgb remains stable, no overt bleeding      Hospital Medications:  aspirin enteric coated 81 milliGRAM(s) Oral daily  atorvastatin 40 milliGRAM(s) Oral daily  budesonide  80 MICROgram(s)/formoterol 4.5 MICROgram(s) Inhaler 2 Puff(s) Inhalation two times a day  chlorhexidine 4% Liquid 1 Application(s) Topical <User Schedule>  dextrose 40% Gel 15 Gram(s) Oral once  dextrose 5% + lactated ringers with potassium chloride 20 mEq/L 1000 milliLiter(s) IV Continuous <Continuous>  dextrose 5%. 1000 milliLiter(s) IV Continuous <Continuous>  dextrose 50% Injectable 25 Gram(s) IV Push once  dextrose 50% Injectable 12.5 Gram(s) IV Push once  dextrose 50% Injectable 25 Gram(s) IV Push once  dextrose 50% Injectable 25 Gram(s) IV Push once  dextrose 50% Injectable 12.5 Gram(s) IV Push once  glucagon  Injectable 1 milliGRAM(s) IntraMuscular once  insulin lispro (ADMELOG) corrective regimen sliding scale   SubCutaneous three times a day before meals  insulin lispro (ADMELOG) corrective regimen sliding scale   SubCutaneous at bedtime  insulin regular Infusion 3 Unit(s)/Hr IV Continuous <Continuous>  magnesium sulfate  IVPB 2 Gram(s) IV Intermittent once  norepinephrine Infusion 0.05 MICROgram(s)/kG/Min IV Continuous <Continuous>  pantoprazole Infusion 8 mG/Hr IV Continuous <Continuous>  piperacillin/tazobactam IVPB.. 3.375 Gram(s) IV Intermittent every 12 hours      PMHX/PSHX:  Cigarette Smoker    Essential Hypertension    CHF (Congestive Heart Failure)    Diabetes Mellitus    Coronary Artery Disease (CAD) Excluded    CAD (Coronary Atherosclerotic Disease)    Hypercholesteremia    CABG (Coronary Artery Bypass Graft)    History of Appendectomy    CABG (Coronary Artery Bypass Graft)            ROS:     General:  No weight loss, fevers, chills, night sweats, fatigue   Eyes:  No vision changes  ENT:  No sore throat, pain, runny nose  CV:  No chest pain, palpitations, dizziness   Resp:  No SOB, cough, wheezing  GI:  See HPI  :  No burning with urination, hematuria  Muscle:  No pain, weakness  Neuro:  No weakness/tingling, memory problems  Psych:  No fatigue, insomnia, mood problems, depression  Heme:  No easy bruisability  Skin:  No rash, edema      PHYSICAL EXAM:     GENERAL:  Well developed, no distress  HEENT:  NC/AT,  conjunctivae clear, sclera anicteric  CHEST:  Full & symmetric excursion, no increased effort w/ respirations  HEART:  Regular rhythm & rate  ABDOMEN:  Soft, non-tender, non-distended  EXTREMITIES:  no LE  edema  SKIN:  No rash/erythema/ecchymoses/petechiae/wounds/jaundice  NEURO:  Alert, oriented    Vital Signs:  Vital Signs Last 24 Hrs  T(C): 36.5 (2022 08:00), Max: 37.2 (2022 12:00)  T(F): 97.7 (2022 08:00), Max: 98.9 (2022 12:00)  HR: 97 (2022 09:00) (74 - 100)  BP: 136/69 (2022 09:00) (99/57 - 136/69)  BP(mean): 87 (2022 09:00) (62 - 87)  RR: 23 (2022 09:00) (19 - 23)  SpO2: 97% (2022 09:00) (95% - 100%)  Daily     Daily     LABS:                        9.0    14.54 )-----------( 145      ( 2022 02:03 )             26.3     02-04    140  |  106  |  87<H>  ----------------------------<  251<H>  4.1   |  21<L>  |  1.71<H>    Ca    8.5      2022 09:08  Phos  1.5     02-04  Mg     1.70     02-04    TPro  5.7<L>  /  Alb  2.9<L>  /  TBili  1.5<H>  /  DBili  x   /  AST  66<H>  /  ALT  12  /  AlkPhos  71  02-03    LIVER FUNCTIONS - ( 2022 09:22 )  Alb: 2.9 g/dL / Pro: 5.7 g/dL / ALK PHOS: 71 U/L / ALT: 12 U/L / AST: 66 U/L / GGT: x           PT/INR - ( 2022 02:25 )   PT: 12.5 sec;   INR: 1.09 ratio         PTT - ( 2022 02:25 )  PTT:26.2 sec  Urinalysis Basic - ( 2022 05:53 )    Color: Light Yellow / Appearance: Slightly Turbid / S.016 / pH: x  Gluc: x / Ketone: Small  / Bili: Negative / Urobili: <2 mg/dL   Blood: x / Protein: Negative / Nitrite: Negative   Leuk Esterase: Large / RBC: 0 /HPF / WBC 49 /HPF   Sq Epi: x / Non Sq Epi: Moderate / Bacteria: Few      Amylase Serum--      Lipase serum--       [id number]      Imaging:

## 2022-02-04 NOTE — PROGRESS NOTE ADULT - PROBLEM SELECTOR PLAN 1
Pt. with hemodynamically mediated DUSTIN in setting of hemorrhagic shock. Exact duration of DUSTIN however unknown. Noted to have Scr of 3.25 on admission on 2/22, then improved to 1.93 today. Noted to be hypotensive, blood pressure was as low as 55/32, started on Levophed. Initial Hb also low 5.6. UA with pyuria and positive LE. Currently on IV vasopressor. Currently non-oliguric, urine output 1.9L over 24h period. On D5LR with 20 meq of potassium at 200 cc/hr. Send urine electrolytes and spot urine TP/CR. Optimize hemodynamics. Will need to consider HD if renal failure continues to worsen. Monitor labs and urine output. Avoid NSAIDs, ACEI/ARBS, RCA and nephrotoxins. Dose medications as per eGFR. Pt. with hemodynamically mediated DUSTIN in setting of hemorrhagic shock. Exact duration of DUSTIN however unknown. Scr elevated at 3.25 on admission on 2/22. Scr improved to 1.93 today. Pt. non-oliguric, urine output ~1.9 L over 24h period. Pt. on IV vasopressor support and on IVFs. Send urine electrolytes and spot urine TP/CR. Monitor labs and urine output. Avoid NSAIDs, ACEI/ARBS, RCA and nephrotoxins. Dose medications as per eGFR.

## 2022-02-04 NOTE — PROGRESS NOTE ADULT - ASSESSMENT
76M with CHF (EF 27% per [employer] in 2011), CAD s/p CABG 2010, CVA with hemiparesis, GERD, MDD, COPD presenting from NH with coffee ground emesis, encephalopathy and hypotension.    Impression:  #Coffee ground emesis - hgb 5.7 concerning for UGIB. Ddx includes stress ulcer, peptic ulcer disease, erosive gastritis, severe reflux esophagitis, MW tear, NSAID gastropathy, Dieulafoy's lesion, angioectasia. No known hx of liver disease.   #CHF - last documented EF in [employer] 27%    Recommendations:  - patient with DKA on insulin gtt, significant cardiac disease and no overt bleeding with stable hgb - will hold off on EGD at this time  - Maintain active type & screen  - Transfuse to maintain Hb >/= 8.0 given cardiac disease  - can c/w PPI IV BID  - if develops overt bleeding, please contact GI for re-eval for more urgent endoscopy      Cassandra Trejo PGY-5  Gastroenterology Fellow  Pager #[phone] ([facility]) or [phone] ([place])  Available on [employer] Teams.  Please contact on-call GI fellow via answering service ([phone]) after 5pm and before 8am, and on weekends.

## 2022-02-04 NOTE — PROGRESS NOTE ADULT - ASSESSMENT
76M with PMHx HFrEF (TTE 8/2011 showed moderate segmental LVSD), CAD s/p CABG, HTN, DM.  Presented from [facility] with abd pain and coffee ground emesis. Found to be hypotensive, anemic to Hb 5.4, with DUSTIN, and with c/f UGIB.  Course now complicated worsening acidosis with high AG     NEURO/PSYCH    - holding home citalopram    SKIN/MSK    GI/NUTRITION    #Coffee ground emesis  Likely UGIB. BUN 165Ho on admission.  downtrending   - pantoprazole infusion s/p 80mg IVP  - holding off on EGD per GI   -NPO currently     #Abd pain  Likely 2/2 UGIB. POCUS showed cholelithiasis.  Abdominal ultrasound with biliary sludge and cholelithiasis. no acute shellie       #GERD  - holding home omeprazole    #Constipation  - holding home senna    #Diet: NPO    ENDO    #DM  a1c 6.8  patient with acidosis, elevated glucose, elevated BHB, low bicarb, concern for DKA   s/p 1AMP bicarb on 2/3/22   started on insulin gtt and currently on D5/LR 200cc/hr   vbg, bmp q4     NEPHRO//METABOLIC/VOLUME    #DUSTIN  Likely prerenal. BUN 165H.  - Hung  -resolving     #HAGMA   -low bicarb   possible DKA/ uremic acidosis   will give 1AMP bicarb, treat with insulin gtt for DKA       HEME/ONC    #Anemia  Likely 2/2 UGIB.  - Hb goal >=8  - s/p pRBCs  - CBC daily   - maintain active T&S    #Thrombocytopenia  Likely reactive.    #VTE ppx: SCDs    CV    #Shock  Possibly 2/2 infection, UGIB,   - norepi  - s/p IVF  - empiric Zosyn  - holding home carvedilol    #HFrEF, CAD, troponinemia   Potential demand ischemia. Last documented LVEF in [employer] was 27%. HsenTnT 1320H.  - TTE w/ no comment on EF.   -cards says troponin is likely demand   -no acute intervention per cardiology now     PULM    -restart home Symbicort   -saturating well on RA     ETHICS    - full code    ID    #Neutrophilic leukocytosis  Likely 2/2 infection. Procal 3.21H.  - s/p IVF  - empiric Zosyn  -MRSA positive, given 1 dose of vanc on 2/3/22. will get level and redose vanc d/t kidney injury   - f/u ucx and bcx  -will get CXR  76M with PMHx HFrEF (TTE 8/2011 showed moderate segmental LVSD), CAD s/p CABG, HTN, DM.  Presented from [facility] with abd pain and coffee ground emesis. Found to be hypotensive, anemic to Hb 5.4, with DUSTIN, and with c/f UGIB.  Course now complicated worsening acidosis with high AG     NEURO/PSYCH    - holding home citalopram  -aaox4     SKIN/MSK    GI/NUTRITION    #Coffee ground emesis  Likely UGIB. BUN 165Ho on admission.  downtrending   - pantoprazole 40mg BID   - holding off on EGD per GI   -will start diet today     #Abd pain  Likely 2/2 UGIB. POCUS showed cholelithiasis.  Abdominal ultrasound with biliary sludge and cholelithiasis. no acute shellie       #GERD  - holding home omeprazole    #Constipation  - holding home senna    #Diet: NPO    ENDO    #DM  a1c 6.8  patient with acidosis, elevated glucose, elevated BHB, low bicarb, concern for DKA   s/p 1AMP bicarb on 2/3/22   started on insulin gtt and currently on D5/LR 200cc/hr --> will transition today. 20 units lantus, 7u premeal with sliding scal   -fluids at .45 NS 125cc/hr   vbg, bmp q4     NEPHRO//METABOLIC/VOLUME    #DUSTIN  Likely prerenal. BUN 165H.  - Hung  -resolving     #HAGMA   -low bicarb   possible DKA/ uremic acidosis   will give 1AMP bicarb, treat with insulin gtt for DKA   -anion gap resolved       HEME/ONC    #Anemia  Likely 2/2 UGIB.  - Hb goal >=8  - s/p pRBCs  - CBC daily   - maintain active T&S    #Thrombocytopenia  Likely reactive.    #VTE ppx: SCDs    CV    #Shock  Possibly 2/2 infection, UGIB,   - norepi  - s/p IVF  - empiric Zosyn  - holding home carvedilol  -will give 1 dose of albumin   -will start midodrine     #HFrEF, CAD, troponinemia   Potential demand ischemia. Last documented LVEF in [employer] was 27%. HsenTnT 1320H.  - TTE w/ no comment on EF.   -cards says troponin is likely demand   -no acute intervention per cardiology now     PULM    -restart home Symbicort   -saturating well on RA     ETHICS    - full code    ID    #Neutrophilic leukocytosis  Likely 2/2 infection. Procal 3.21H.  - s/p IVF  - empiric Zosyn  -MRSA positive, given 1 dose of vanc on 2/3/22. will get level and redose vanc d/t kidney injury   - f/u ucx and bcx  -will get CXR

## 2022-02-04 NOTE — PROGRESS NOTE ADULT - SUBJECTIVE AND OBJECTIVE BOX
[facility] DIVISION OF KIDNEY DISEASES AND HYPERTENSION -- FOLLOW UP NOTE  --------------------------------------------------------------------------------  HPI: 76-year-old male with PMH of HFrEF, CAD, COPD, HTN and DM type 2 presented to [facility] from nursing home for abdominal pain and coffee ground emesis. Admitted to MICU for UGIB. Noted to be hypotensive, blood pressure was as low as 55/32, started on Levophed. Initial Hb also low 5.6, received 3 units pRBC transfusion. Nephrology following for DUSTIN. On review of labs on [facility] HIE/[place], patient noted to have Scr of 3.25 on admission on 2/22, then improved to 1.93 today.     Patient was seen and examined at bedside. Appears somnolent but arousable. Denies CP or SOB.     PAST HISTORY  --------------------------------------------------------------------------------  No significant changes to PMH, PSH, FHx, SHx, unless otherwise noted    ALLERGIES & MEDICATIONS  --------------------------------------------------------------------------------  Allergies    No Known Allergies    Intolerances    Standing Inpatient Medications  aspirin enteric coated 81 milliGRAM(s) Oral daily  atorvastatin 40 milliGRAM(s) Oral daily  budesonide  80 MICROgram(s)/formoterol 4.5 MICROgram(s) Inhaler 2 Puff(s) Inhalation two times a day  chlorhexidine 4% Liquid 1 Application(s) Topical <User Schedule>  dextrose 40% Gel 15 Gram(s) Oral once  dextrose 5% + lactated ringers with potassium chloride 20 mEq/L 1000 milliLiter(s) IV Continuous <Continuous>  dextrose 5%. 1000 milliLiter(s) IV Continuous <Continuous>  dextrose 50% Injectable 25 Gram(s) IV Push once  dextrose 50% Injectable 12.5 Gram(s) IV Push once  dextrose 50% Injectable 25 Gram(s) IV Push once  dextrose 50% Injectable 25 Gram(s) IV Push once  dextrose 50% Injectable 12.5 Gram(s) IV Push once  glucagon  Injectable 1 milliGRAM(s) IntraMuscular once  insulin lispro (ADMELOG) corrective regimen sliding scale   SubCutaneous three times a day before meals  insulin lispro (ADMELOG) corrective regimen sliding scale   SubCutaneous at bedtime  insulin regular Infusion 3 Unit(s)/Hr IV Continuous <Continuous>  norepinephrine Infusion 0.05 MICROgram(s)/kG/Min IV Continuous <Continuous>  pantoprazole Infusion 8 mG/Hr IV Continuous <Continuous>  piperacillin/tazobactam IVPB.. 3.375 Gram(s) IV Intermittent every 12 hours  potassium phosphate IVPB 30 milliMole(s) IV Intermittent once    PRN Inpatient Medications    REVIEW OF SYSTEMS  --------------------------------------------------------------------------------  Gen: No fevers/chills  Respiratory: No dyspnea, cough  CV: No chest pain  GI: No diarrhea  : No hematuria  MSK: No  edema    All other systems were reviewed and are negative, except as noted.    VITALS/PHYSICAL EXAM  --------------------------------------------------------------------------------  T(C): 36.5 (02-04-22 @ 00:00), Max: 37.2 (02-03-22 @ 08:00)  HR: 88 (02-04-22 @ 07:00) (74 - 100)  BP: 129/61 (02-04-22 @ 07:00) (99/57 - 133/58)  RR: 20 (02-04-22 @ 07:00) (19 - 23)  SpO2: 98% (02-04-22 @ 07:00) (95% - 100%)  Wt(kg): --  Height (cm): 170.2 (02-03-22 @ 02:23)  Weight (kg): 90 (02-03-22 @ 02:23)  BMI (kg/m2): 31.1 (02-03-22 @ 02:23)  BSA (m2): 2.02 (02-03-22 @ 02:23)    02-03-22 @ 07:01  -  02-04-22 @ 07:00  --------------------------------------------------------  IN: 3447.3 mL / OUT: 1925 mL / NET: 1522.3 mL    Physical Exam:  	Gen: NAD  	HEENT: MMM  	Pulm: CTA B/L, no crackles   	CV: S1S2  	Abd: Soft, +BS   	Ext: No LE edema B/L  	Neuro: somnolent but arousable   	Skin: Warm and dry  	: isabel with clear yellow urine     LABS/STUDIES  --------------------------------------------------------------------------------              9.0    14.54 >-----------<  145      [02-04-22 @ 02:03]              26.3     138  |  103  |  106  ----------------------------<  120      [02-04-22 @ 02:03]  3.8   |  17  |  1.93        Ca     8.7     [02-04-22 @ 02:03]      Mg     1.20     [02-04-22 @ 06:59]      Phos  1.4     [02-04-22 @ 02:03]    TPro  5.7  /  Alb  2.9  /  TBili  1.5  /  DBili  x   /  AST  66  /  ALT  12  /  AlkPhos  71  [02-03-22 @ 09:22]    PT/INR: PT 12.5 , INR 1.09       [02-04-22 @ 02:25]  PTT: 26.2       [02-04-22 @ 02:25]          [02-03-22 @ 09:26]    Creatinine Trend:  SCr 1.93 [02-04 @ 02:03]  SCr 1.79 [02-03 @ 21:45]  SCr 2.18 [02-03 @ 15:50]  SCr 2.54 [02-03 @ 09:22]  SCr 2.51 [02-03 @ 02:54]    Urinalysis - [02-03-22 @ 05:53]      Color Light Yellow / Appearance Slightly Turbid / SG 1.016 / pH 5.0      Gluc Trace / Ketone Small  / Bili Negative / Urobili <2 mg/dL       Blood Small / Protein Negative / Leuk Est Large / Nitrite Negative      RBC 0 / WBC 49 / Hyaline 2 / Gran  / Sq Epi  / Non Sq Epi Moderate / Bacteria Few    TSH 2.99      [02-03-22 @ 02:57]       [facility] DIVISION OF KIDNEY DISEASES AND HYPERTENSION -- FOLLOW UP NOTE  --------------------------------------------------------------------------------  HPI: 76-year-old male with PMH of HFrEF, CAD, COPD, HTN and DM type 2 presented to [facility] from nursing home for abdominal pain and coffee ground emesis. Admitted to MICU for UGIB. Noted to be hypotensive, blood pressure was as low as 55/32, started on Levophed. Initial Hb also low 5.6, received 3 units pRBC transfusion. Nephrology following for DUSTIN. On review of labs on [facility] HIE/[place], patient noted to have elevated Scr of 3.25 on admission on 2/22. Scr improved to 1.93 today.     Patient was seen and examined at bedside. Appears somnolent but arousable. Denies CP or SOB.     PAST HISTORY  --------------------------------------------------------------------------------  No significant changes to PMH, PSH, FHx, SHx, unless otherwise noted    ALLERGIES & MEDICATIONS  --------------------------------------------------------------------------------  Allergies    No Known Allergies    Intolerances    Standing Inpatient Medications  aspirin enteric coated 81 milliGRAM(s) Oral daily  atorvastatin 40 milliGRAM(s) Oral daily  budesonide  80 MICROgram(s)/formoterol 4.5 MICROgram(s) Inhaler 2 Puff(s) Inhalation two times a day  chlorhexidine 4% Liquid 1 Application(s) Topical <User Schedule>  dextrose 40% Gel 15 Gram(s) Oral once  dextrose 5% + lactated ringers with potassium chloride 20 mEq/L 1000 milliLiter(s) IV Continuous <Continuous>  dextrose 5%. 1000 milliLiter(s) IV Continuous <Continuous>  dextrose 50% Injectable 25 Gram(s) IV Push once  dextrose 50% Injectable 12.5 Gram(s) IV Push once  dextrose 50% Injectable 25 Gram(s) IV Push once  dextrose 50% Injectable 25 Gram(s) IV Push once  dextrose 50% Injectable 12.5 Gram(s) IV Push once  glucagon  Injectable 1 milliGRAM(s) IntraMuscular once  insulin lispro (ADMELOG) corrective regimen sliding scale   SubCutaneous three times a day before meals  insulin lispro (ADMELOG) corrective regimen sliding scale   SubCutaneous at bedtime  insulin regular Infusion 3 Unit(s)/Hr IV Continuous <Continuous>  norepinephrine Infusion 0.05 MICROgram(s)/kG/Min IV Continuous <Continuous>  pantoprazole Infusion 8 mG/Hr IV Continuous <Continuous>  piperacillin/tazobactam IVPB.. 3.375 Gram(s) IV Intermittent every 12 hours  potassium phosphate IVPB 30 milliMole(s) IV Intermittent once    PRN Inpatient Medications    REVIEW OF SYSTEMS  --------------------------------------------------------------------------------  Gen: No fevers/chills  Respiratory: No dyspnea, cough  CV: No chest pain  GI: No diarrhea  : No hematuria  MSK: No  edema    All other systems were reviewed and are negative, except as noted.    VITALS/PHYSICAL EXAM  --------------------------------------------------------------------------------  T(C): 36.5 (02-04-22 @ 00:00), Max: 37.2 (02-03-22 @ 08:00)  HR: 88 (02-04-22 @ 07:00) (74 - 100)  BP: 129/61 (02-04-22 @ 07:00) (99/57 - 133/58)  RR: 20 (02-04-22 @ 07:00) (19 - 23)  SpO2: 98% (02-04-22 @ 07:00) (95% - 100%)  Wt(kg): --  Height (cm): 170.2 (02-03-22 @ 02:23)  Weight (kg): 90 (02-03-22 @ 02:23)  BMI (kg/m2): 31.1 (02-03-22 @ 02:23)  BSA (m2): 2.02 (02-03-22 @ 02:23)    02-03-22 @ 07:01  -  02-04-22 @ 07:00  --------------------------------------------------------  IN: 3447.3 mL / OUT: 1925 mL / NET: 1522.3 mL    Physical Exam:  	Gen: resting  	HEENT: MMM  	Pulm: CTA B/L, no crackles   	CV: S1S2  	Abd: Soft, +BS   	Ext: No LE edema B/L  	Neuro: somnolent but arousable   	Skin: Warm and dry  	: isabel with clear yellow urine     LABS/STUDIES  --------------------------------------------------------------------------------              9.0    14.54 >-----------<  145      [02-04-22 @ 02:03]              26.3     138  |  103  |  106  ----------------------------<  120      [02-04-22 @ 02:03]  3.8   |  17  |  1.93        Ca     8.7     [02-04-22 @ 02:03]      Mg     1.20     [02-04-22 @ 06:59]      Phos  1.4     [02-04-22 @ 02:03]    TPro  5.7  /  Alb  2.9  /  TBili  1.5  /  DBili  x   /  AST  66  /  ALT  12  /  AlkPhos  71  [02-03-22 @ 09:22]          [02-03-22 @ 09:26]    Creatinine Trend:  SCr 1.93 [02-04 @ 02:03]  SCr 1.79 [02-03 @ 21:45]  SCr 2.18 [02-03 @ 15:50]  SCr 2.54 [02-03 @ 09:22]  SCr 2.51 [02-03 @ 02:54]    Urinalysis - [02-03-22 @ 05:53]      Color Light Yellow / Appearance Slightly Turbid / SG 1.016 / pH 5.0      Gluc Trace / Ketone Small  / Bili Negative / Urobili <2 mg/dL       Blood Small / Protein Negative / Leuk Est Large / Nitrite Negative      RBC 0 / WBC 49 / Hyaline 2 / Gran  / Sq Epi  / Non Sq Epi Moderate / Bacteria Few

## 2022-02-04 NOTE — DIETITIAN INITIAL EVALUATION ADULT. - SIGNS/SYMPTOMS
0% energy intake x 2d, findings of mild muscle depletion/subcutaneous fat loss, 1+ generalized edema

## 2022-02-04 NOTE — DIETITIAN INITIAL EVALUATION ADULT. - PERTINENT MEDS FT
MEDICATIONS  (STANDING):  albumin human  5% IVPB 250 milliLiter(s) IV Intermittent once  aspirin enteric coated 81 milliGRAM(s) Oral daily  atorvastatin 40 milliGRAM(s) Oral daily  budesonide  80 MICROgram(s)/formoterol 4.5 MICROgram(s) Inhaler 2 Puff(s) Inhalation two times a day  chlorhexidine 4% Liquid 1 Application(s) Topical <User Schedule>  dextrose 40% Gel 15 Gram(s) Oral once  dextrose 40% Gel 15 Gram(s) Oral once  dextrose 5% + lactated ringers with potassium chloride 20 mEq/L 1000 milliLiter(s) (200 mL/Hr) IV Continuous <Continuous>  dextrose 5%. 1000 milliLiter(s) (50 mL/Hr) IV Continuous <Continuous>  dextrose 50% Injectable 25 Gram(s) IV Push once  dextrose 50% Injectable 12.5 Gram(s) IV Push once  dextrose 50% Injectable 25 Gram(s) IV Push once  dextrose 50% Injectable 25 Gram(s) IV Push once  dextrose 50% Injectable 12.5 Gram(s) IV Push once  glucagon  Injectable 1 milliGRAM(s) IntraMuscular once  insulin glargine Injectable (LANTUS) 20 Unit(s) SubCutaneous once  insulin lispro (ADMELOG) corrective regimen sliding scale   SubCutaneous three times a day before meals  insulin lispro (ADMELOG) corrective regimen sliding scale   SubCutaneous at bedtime  insulin regular Infusion 3 Unit(s)/Hr (3 mL/Hr) IV Continuous <Continuous>  midodrine 10 milliGRAM(s) Oral every 8 hours  norepinephrine Infusion 0.05 MICROgram(s)/kG/Min (8.68 mL/Hr) IV Continuous <Continuous>  pantoprazole  Injectable 40 milliGRAM(s) IV Push two times a day  piperacillin/tazobactam IVPB.. 3.375 Gram(s) IV Intermittent every 12 hours  vancomycin  IVPB 500 milliGRAM(s) IV Intermittent once

## 2022-02-04 NOTE — DIETITIAN INITIAL EVALUATION ADULT. - PERTINENT LABORATORY DATA
02-04    140  |  106  |  87<H>  ----------------------------<  251<H>  4.1   |  21<L>  |  1.71<H>    Ca    8.5      04 Feb 2022 09:08  Phos  1.5     02-04  Mg     1.70     02-04    TPro  5.7<L>  /  Alb  2.9<L>  /  TBili  1.5<H>  /  DBili  x   /  AST  66<H>  /  ALT  12  /  AlkPhos  71  02-03    HbA1c 6.3% (2/3)    CAPILLARY BLOOD GLUCOSE      POCT Blood Glucose.: 225 mg/dL (04 Feb 2022 11:12)  POCT Blood Glucose.: 231 mg/dL (04 Feb 2022 10:38)  POCT Blood Glucose.: 210 mg/dL (04 Feb 2022 08:56)  POCT Blood Glucose.: 101 mg/dL (04 Feb 2022 08:52)  POCT Blood Glucose.: 231 mg/dL (04 Feb 2022 08:05)  POCT Blood Glucose.: 208 mg/dL (04 Feb 2022 06:56)  POCT Blood Glucose.: 206 mg/dL (04 Feb 2022 06:02)  POCT Blood Glucose.: 205 mg/dL (04 Feb 2022 04:19)  POCT Blood Glucose.: 173 mg/dL (04 Feb 2022 03:03)  POCT Blood Glucose.: 196 mg/dL (04 Feb 2022 02:14)  POCT Blood Glucose.: 133 mg/dL (04 Feb 2022 01:12)  POCT Blood Glucose.: 132 mg/dL (03 Feb 2022 23:11)  POCT Blood Glucose.: 95 mg/dL (03 Feb 2022 22:19)  POCT Blood Glucose.: 137 mg/dL (03 Feb 2022 21:10)  POCT Blood Glucose.: 197 mg/dL (03 Feb 2022 20:15)  POCT Blood Glucose.: 276 mg/dL (03 Feb 2022 18:54)  POCT Blood Glucose.: 291 mg/dL (03 Feb 2022 17:44)

## 2022-02-04 NOTE — PROGRESS NOTE ADULT - PROBLEM SELECTOR PLAN 2
Pt. with metabolic acidosis in setting of hypotension, hyperglycemia and DUSTIN. Serum CO2 improved to 17 today. Currently on insulin drip and IV fluids. Monitor serum CO2.     If any questions, please feel free to contact me     Gutierrez Bateman  Nephrology Fellow  [facility] Pager: [phone]  [facility] Pager: [phone] Pt. with metabolic acidosis in setting of hypotension, hyperglycemia and DUSTIN. Serum CO2 improved to 17 today. Currently on insulin drip and IVFs. Monitor serum CO2.     If any questions, please feel free to contact me     Gutierrez Bateman  Nephrology Fellow  [facility] Pager: [phone]  [facility] Pager: [phone]

## 2022-02-04 NOTE — DIETITIAN INITIAL EVALUATION ADULT. - OTHER INFO
77yo M w Hx of HLD, DM, HTN, CAD s/p CABG, CHF.  Transferred from NH & admitted w abdominal pain and coffee ground emesis, hypotensive, Dx UGIB.  S/p mutliple units PRBC (this must be considered when evaluating HbA1c).     Also with DKA and DUSTIN on insulin drip.  Has been NPO since admission (x2d).... PO diet just advanced.  RDN reviewed transfer paperwork from NH which did not provide diet order PTA or height/weight.    Spoke with RN and met with Pt.    Pt. alert, although seems lethargic.  Is questionable historian and is minimally [verbally] interactive during RDN encounter.  Simply nods yes/no to mostly all nutrition related questions.  Denies food allergies.  Pt. does not elaborate on PO intake PTA or if he was any specialized diet (i.e. consistent carbohydrate, low sodium, etc) or consuming any PO supplements (i.e. Glucerna).  Denies any issues chewing/swallowing.  Nods "no" when RDN inquired as to if anyone monitored his blood glucose PTA.  Does endorse some nausea @ this time and requesting ginger ale.   Pt. unable to specify current approximate weight or if he has had any recent weight changes.  However after much questioning, Pt.'s usual body weight is between 200-210lbs.

## 2022-02-05 DIAGNOSIS — E87.3 ALKALOSIS: ICD-10-CM

## 2022-02-05 DIAGNOSIS — E83.39 OTHER DISORDERS OF PHOSPHORUS METABOLISM: ICD-10-CM

## 2022-02-05 LAB
ALBUMIN SERPL ELPH-MCNC: 2.3 G/DL — LOW (ref 3.3–5)
ALP SERPL-CCNC: 56 U/L — SIGNIFICANT CHANGE UP (ref 40–120)
ALT FLD-CCNC: 7 U/L — SIGNIFICANT CHANGE UP (ref 4–41)
ANION GAP SERPL CALC-SCNC: 12 MMOL/L — SIGNIFICANT CHANGE UP (ref 7–14)
AST SERPL-CCNC: 20 U/L — SIGNIFICANT CHANGE UP (ref 4–40)
BASOPHILS # BLD AUTO: 0.01 K/UL — SIGNIFICANT CHANGE UP (ref 0–0.2)
BASOPHILS NFR BLD AUTO: 0.1 % — SIGNIFICANT CHANGE UP (ref 0–2)
BILIRUB SERPL-MCNC: 1.2 MG/DL — SIGNIFICANT CHANGE UP (ref 0.2–1.2)
BLOOD GAS ARTERIAL - LYTES,HGB,ICA,LACT RESULT: SIGNIFICANT CHANGE UP
BUN SERPL-MCNC: 60 MG/DL — HIGH (ref 7–23)
CALCIUM SERPL-MCNC: 8.2 MG/DL — LOW (ref 8.4–10.5)
CHLORIDE SERPL-SCNC: 107 MMOL/L — SIGNIFICANT CHANGE UP (ref 98–107)
CO2 SERPL-SCNC: 19 MMOL/L — LOW (ref 22–31)
CREAT SERPL-MCNC: 1.42 MG/DL — HIGH (ref 0.5–1.3)
EOSINOPHIL # BLD AUTO: 0.02 K/UL — SIGNIFICANT CHANGE UP (ref 0–0.5)
EOSINOPHIL NFR BLD AUTO: 0.2 % — SIGNIFICANT CHANGE UP (ref 0–6)
GLUCOSE BLDC GLUCOMTR-MCNC: 137 MG/DL — HIGH (ref 70–99)
GLUCOSE BLDC GLUCOMTR-MCNC: 195 MG/DL — HIGH (ref 70–99)
GLUCOSE BLDC GLUCOMTR-MCNC: 211 MG/DL — HIGH (ref 70–99)
GLUCOSE BLDC GLUCOMTR-MCNC: 261 MG/DL — HIGH (ref 70–99)
GLUCOSE BLDC GLUCOMTR-MCNC: 76 MG/DL — SIGNIFICANT CHANGE UP (ref 70–99)
GLUCOSE BLDC GLUCOMTR-MCNC: 79 MG/DL — SIGNIFICANT CHANGE UP (ref 70–99)
GLUCOSE SERPL-MCNC: 155 MG/DL — HIGH (ref 70–99)
HCT VFR BLD CALC: 22.8 % — LOW (ref 39–50)
HGB BLD-MCNC: 8.1 G/DL — LOW (ref 13–17)
IANC: 9.62 K/UL — HIGH (ref 1.5–8.5)
IMM GRANULOCYTES NFR BLD AUTO: 1 % — SIGNIFICANT CHANGE UP (ref 0–1.5)
LYMPHOCYTES # BLD AUTO: 1.52 K/UL — SIGNIFICANT CHANGE UP (ref 1–3.3)
LYMPHOCYTES # BLD AUTO: 12.1 % — LOW (ref 13–44)
MAGNESIUM SERPL-MCNC: 1.8 MG/DL — SIGNIFICANT CHANGE UP (ref 1.6–2.6)
MCHC RBC-ENTMCNC: 30.7 PG — SIGNIFICANT CHANGE UP (ref 27–34)
MCHC RBC-ENTMCNC: 35.5 GM/DL — SIGNIFICANT CHANGE UP (ref 32–36)
MCV RBC AUTO: 86.4 FL — SIGNIFICANT CHANGE UP (ref 80–100)
MONOCYTES # BLD AUTO: 1.28 K/UL — HIGH (ref 0–0.9)
MONOCYTES NFR BLD AUTO: 10.2 % — SIGNIFICANT CHANGE UP (ref 2–14)
NEUTROPHILS # BLD AUTO: 9.62 K/UL — HIGH (ref 1.8–7.4)
NEUTROPHILS NFR BLD AUTO: 76.4 % — SIGNIFICANT CHANGE UP (ref 43–77)
NRBC # BLD: 0 /100 WBCS — SIGNIFICANT CHANGE UP
NRBC # FLD: 0.02 K/UL — HIGH
PHOSPHATE SERPL-MCNC: 2.3 MG/DL — LOW (ref 2.5–4.5)
PLATELET # BLD AUTO: 117 K/UL — LOW (ref 150–400)
POTASSIUM SERPL-MCNC: 4.3 MMOL/L — SIGNIFICANT CHANGE UP (ref 3.5–5.3)
POTASSIUM SERPL-SCNC: 4.3 MMOL/L — SIGNIFICANT CHANGE UP (ref 3.5–5.3)
PROT SERPL-MCNC: 4.8 G/DL — LOW (ref 6–8.3)
RBC # BLD: 2.64 M/UL — LOW (ref 4.2–5.8)
RBC # FLD: 15.9 % — HIGH (ref 10.3–14.5)
SODIUM SERPL-SCNC: 138 MMOL/L — SIGNIFICANT CHANGE UP (ref 135–145)
VANCOMYCIN FLD-MCNC: 9.9 UG/ML — SIGNIFICANT CHANGE UP
WBC # BLD: 12.57 K/UL — HIGH (ref 3.8–10.5)
WBC # FLD AUTO: 12.57 K/UL — HIGH (ref 3.8–10.5)

## 2022-02-05 PROCEDURE — 99232 SBSQ HOSP IP/OBS MODERATE 35: CPT

## 2022-02-05 PROCEDURE — 99291 CRITICAL CARE FIRST HOUR: CPT

## 2022-02-05 RX ORDER — SENNA PLUS 8.6 MG/1
2 TABLET ORAL AT BEDTIME
Refills: 0 | Status: DISCONTINUED | OUTPATIENT
Start: 2022-02-05 | End: 2022-02-18

## 2022-02-05 RX ORDER — POLYETHYLENE GLYCOL 3350 17 G/17G
17 POWDER, FOR SOLUTION ORAL DAILY
Refills: 0 | Status: DISCONTINUED | OUTPATIENT
Start: 2022-02-05 | End: 2022-02-18

## 2022-02-05 RX ORDER — MIDODRINE HYDROCHLORIDE 2.5 MG/1
20 TABLET ORAL EVERY 8 HOURS
Refills: 0 | Status: DISCONTINUED | OUTPATIENT
Start: 2022-02-05 | End: 2022-02-10

## 2022-02-05 RX ORDER — VANCOMYCIN HCL 1 G
1000 VIAL (EA) INTRAVENOUS ONCE
Refills: 0 | Status: COMPLETED | OUTPATIENT
Start: 2022-02-05 | End: 2022-02-05

## 2022-02-05 RX ADMIN — ATORVASTATIN CALCIUM 40 MILLIGRAM(S): 80 TABLET, FILM COATED ORAL at 11:55

## 2022-02-05 RX ADMIN — BUDESONIDE AND FORMOTEROL FUMARATE DIHYDRATE 2 PUFF(S): 160; 4.5 AEROSOL RESPIRATORY (INHALATION) at 09:59

## 2022-02-05 RX ADMIN — POLYETHYLENE GLYCOL 3350 17 GRAM(S): 17 POWDER, FOR SOLUTION ORAL at 11:55

## 2022-02-05 RX ADMIN — BUDESONIDE AND FORMOTEROL FUMARATE DIHYDRATE 2 PUFF(S): 160; 4.5 AEROSOL RESPIRATORY (INHALATION) at 20:05

## 2022-02-05 RX ADMIN — Medication 85 MILLIMOLE(S): at 04:13

## 2022-02-05 RX ADMIN — MIDODRINE HYDROCHLORIDE 20 MILLIGRAM(S): 2.5 TABLET ORAL at 15:55

## 2022-02-05 RX ADMIN — Medication 7 UNIT(S): at 11:57

## 2022-02-05 RX ADMIN — SCOPALAMINE 1 PATCH: 1 PATCH, EXTENDED RELEASE TRANSDERMAL at 08:05

## 2022-02-05 RX ADMIN — Medication 4: at 11:57

## 2022-02-05 RX ADMIN — PANTOPRAZOLE SODIUM 40 MILLIGRAM(S): 20 TABLET, DELAYED RELEASE ORAL at 17:28

## 2022-02-05 RX ADMIN — PANTOPRAZOLE SODIUM 40 MILLIGRAM(S): 20 TABLET, DELAYED RELEASE ORAL at 05:57

## 2022-02-05 RX ADMIN — Medication 250 MILLIGRAM(S): at 06:41

## 2022-02-05 RX ADMIN — MIDODRINE HYDROCHLORIDE 20 MILLIGRAM(S): 2.5 TABLET ORAL at 21:55

## 2022-02-05 RX ADMIN — CHLORHEXIDINE GLUCONATE 1 APPLICATION(S): 213 SOLUTION TOPICAL at 05:34

## 2022-02-05 RX ADMIN — Medication 7 UNIT(S): at 08:34

## 2022-02-05 RX ADMIN — MIDODRINE HYDROCHLORIDE 10 MILLIGRAM(S): 2.5 TABLET ORAL at 05:57

## 2022-02-05 RX ADMIN — Medication 7 UNIT(S): at 16:40

## 2022-02-05 RX ADMIN — Medication 6: at 08:32

## 2022-02-05 RX ADMIN — Medication 8.68 MICROGRAM(S)/KG/MIN: at 15:48

## 2022-02-05 RX ADMIN — SENNA PLUS 2 TABLET(S): 8.6 TABLET ORAL at 21:56

## 2022-02-05 RX ADMIN — PIPERACILLIN AND TAZOBACTAM 25 GRAM(S): 4; .5 INJECTION, POWDER, LYOPHILIZED, FOR SOLUTION INTRAVENOUS at 04:00

## 2022-02-05 RX ADMIN — PIPERACILLIN AND TAZOBACTAM 25 GRAM(S): 4; .5 INJECTION, POWDER, LYOPHILIZED, FOR SOLUTION INTRAVENOUS at 15:55

## 2022-02-05 NOTE — PROGRESS NOTE ADULT - ASSESSMENT
76M with PMHx HFrEF (TTE 8/2011 showed moderate segmental LVSD), CAD s/p CABG, HTN, DM.  Presented from [facility] with abd pain and coffee ground emesis. Found to be hypotensive, anemic to Hb 5.4, with DUSTIN, and with c/f UGIB.  Course now complicated worsening acidosis with high AG     NEURO/PSYCH    - holding home citalopram  -aaox4     SKIN/MSK    GI/NUTRITION    #Coffee ground emesis  Likely UGIB. BUN 165Ho on admission.  downtrending   - pantoprazole 40mg BID   - holding off on EGD per GI   -will start diet today     #Abd pain  Likely 2/2 UGIB. POCUS showed cholelithiasis.  Abdominal ultrasound with biliary sludge and cholelithiasis. no acute shellie       #GERD  - holding home omeprazole    #Constipation  - holding home senna    #Diet: NPO    ENDO    #DM  a1c 6.8  patient with acidosis, elevated glucose, elevated BHB, low bicarb, concern for DKA   s/p 1AMP bicarb on 2/3/22   started on insulin gtt and currently on D5/LR 200cc/hr --> will transition today. 20 units lantus, 7u premeal with sliding scal   -fluids at .45 NS 125cc/hr   vbg, bmp q4     NEPHRO//METABOLIC/VOLUME    #DUSTIN  Likely prerenal. BUN 165H.  - Hung  -resolving     #HAGMA   -low bicarb   possible DKA/ uremic acidosis   will give 1AMP bicarb, treat with insulin gtt for DKA   -anion gap resolved       HEME/ONC    #Anemia  Likely 2/2 UGIB.  - Hb goal >=8  - s/p pRBCs  - CBC daily   - maintain active T&S    #Thrombocytopenia  Likely reactive.    #VTE ppx: SCDs    CV    #Shock  Possibly 2/2 infection, UGIB,   - norepi  - s/p IVF  - empiric Zosyn  - holding home carvedilol  -will give 1 dose of albumin   -will start midodrine     #HFrEF, CAD, troponinemia   Potential demand ischemia. Last documented LVEF in [employer] was 27%. HsenTnT 1320H.  - TTE w/ no comment on EF.   -cards says troponin is likely demand   -no acute intervention per cardiology now     PULM    -restart home Symbicort   -saturating well on RA     ETHICS    - full code    ID    #Neutrophilic leukocytosis  Likely 2/2 infection. Procal 3.21H.  - s/p IVF  - empiric Zosyn  -MRSA positive, given 1 dose of vanc on 2/3/22. will get level and redose vanc d/t kidney injury   - f/u ucx and bcx  -will get CXR  76M with PMHx HFrEF (TTE 8/2011 showed moderate segmental LVSD), CAD s/p CABG, HTN, DM.  Presented from [facility] with abd pain and coffee ground emesis. Found to be hypotensive, anemic to Hb 5.4, with DUSTIN, and with c/f UGIB.  Course now complicated worsening acidosis with high AG     NEURO/PSYCH    - holding home citalopram  -aaox4     GI/NUTRITION    #Coffee ground emesis  Likely UGIB. BUN 165Ho on admission.  downtrending   - pantoprazole 40mg BID   - holding off on EGD per GI   -diet as tolerated  -bowel regimen     #Abd pain  Likely 2/2 UGIB. POCUS showed cholelithiasis.  Abdominal ultrasound with biliary sludge and cholelithiasis. no acute shellie       #GERD  - holding home omeprazole    ENDO    -DKA reoslved  -on lantus 20, admelog 7uTID   -continue to monitor BMP  -carb ocnsistent diet     NEPHRO//METABOLIC/VOLUME    #DUSTIN  Likely prerenal. BUN 165H.  - Hung  -resolving     #HAGMA   -resolved   -anion gap resolved       HEME/ONC    #Anemia  Likely 2/2 UGIB.  - Hb goal >=8  - s/p pRBCs  - CBC daily   - maintain active T&S    #Thrombocytopenia  Likely reactive.    #VTE ppx: SCDs    CV    #Shock  Possibly 2/2 infection, UGIB,   - norepi  - s/p IVF  - empiric Zosyn, dosing vanc by level   -1 g vanc overnight   - holding home carvedilol  -increase midodrine to 20tid and wean norepi     #HFrEF, CAD, troponinemia   Potential demand ischemia. Last documented LVEF in [employer] was 27%. HsenTnT 1320H.  - TTE w/ no comment on EF.   -cards says troponin is likely demand   -no acute intervention per cardiology now     PULM    -restart home Symbicort   -saturating well on RA     ETHICS    - full code    ID    #Neutrophilic leukocytosis  Likely 2/2 infection. Procal 3.21H.  - s/p IVF  - empiric Zosyn  -MRSA positive, given 1 dose of vanc on 2/3/22.  - f/u ucx and bcx  -will get CXR

## 2022-02-05 NOTE — PROGRESS NOTE ADULT - SUBJECTIVE AND OBJECTIVE BOX
INTERVAL HPI/OVERNIGHT EVENTS:    SUBJECTIVE: Patient seen and examined at bedside. Intubated, sedated, ROS cannot be obtained.       VITAL SIGNS:  ICU Vital Signs Last 24 Hrs  T(C): 35.6 (05 Feb 2022 04:00), Max: 36.6 (04 Feb 2022 16:00)  T(F): 96.1 (05 Feb 2022 04:00), Max: 97.9 (04 Feb 2022 16:00)  HR: 82 (05 Feb 2022 07:00) (69 - 109)  BP: 105/91 (05 Feb 2022 07:00) (82/67 - 137/74)  BP(mean): 98 (05 Feb 2022 07:00) (60 - 98)  ABP: --  ABP(mean): --  RR: 23 (05 Feb 2022 07:00) (14 - 27)  SpO2: 94% (05 Feb 2022 07:00) (91% - 100%)      Plateau pressure:   P/F ratio:     02-04 @ 07:01  -  02-05 @ 07:00  --------------------------------------------------------  IN: 1985 mL / OUT: 1775 mL / NET: 210 mL      CAPILLARY BLOOD GLUCOSE      POCT Blood Glucose.: 261 mg/dL (05 Feb 2022 08:14)    ECG:    PHYSICAL EXAM:    General:   HEENT:   Neck:   Respiratory:   Cardiovascular:   Abdomen:   Extremities:  Neurological:    MEDICATIONS:  MEDICATIONS  (STANDING):  aspirin enteric coated 81 milliGRAM(s) Oral daily  atorvastatin 40 milliGRAM(s) Oral daily  budesonide  80 MICROgram(s)/formoterol 4.5 MICROgram(s) Inhaler 2 Puff(s) Inhalation two times a day  chlorhexidine 4% Liquid 1 Application(s) Topical <User Schedule>  dextrose 40% Gel 15 Gram(s) Oral once  dextrose 40% Gel 15 Gram(s) Oral once  dextrose 5%. 1000 milliLiter(s) (50 mL/Hr) IV Continuous <Continuous>  dextrose 50% Injectable 25 Gram(s) IV Push once  dextrose 50% Injectable 12.5 Gram(s) IV Push once  dextrose 50% Injectable 25 Gram(s) IV Push once  dextrose 50% Injectable 25 Gram(s) IV Push once  dextrose 50% Injectable 12.5 Gram(s) IV Push once  glucagon  Injectable 1 milliGRAM(s) IntraMuscular once  insulin lispro (ADMELOG) corrective regimen sliding scale   SubCutaneous three times a day before meals  insulin lispro (ADMELOG) corrective regimen sliding scale   SubCutaneous at bedtime  insulin lispro Injectable (ADMELOG) 7 Unit(s) SubCutaneous three times a day before meals  midodrine 10 milliGRAM(s) Oral every 8 hours  norepinephrine Infusion 0.05 MICROgram(s)/kG/Min (8.68 mL/Hr) IV Continuous <Continuous>  pantoprazole  Injectable 40 milliGRAM(s) IV Push two times a day  piperacillin/tazobactam IVPB.. 3.375 Gram(s) IV Intermittent every 12 hours  polyethylene glycol 3350 17 Gram(s) Oral daily  senna 2 Tablet(s) Oral at bedtime    MEDICATIONS  (PRN):      ALLERGIES:  Allergies    No Known Allergies    Intolerances        LABS:                        8.1    12.57 )-----------( 117      ( 05 Feb 2022 01:54 )             22.8     02-05    138  |  107  |  60<H>  ----------------------------<  155<H>  4.3   |  19<L>  |  1.42<H>    Ca    8.2<L>      05 Feb 2022 01:54  Phos  2.3     02-05  Mg     1.80     02-05    TPro  4.8<L>  /  Alb  2.3<L>  /  TBili  1.2  /  DBili  x   /  AST  20  /  ALT  7   /  AlkPhos  56  02-05    PT/INR - ( 04 Feb 2022 02:25 )   PT: 12.5 sec;   INR: 1.09 ratio         PTT - ( 04 Feb 2022 02:25 )  PTT:26.2 sec      RADIOLOGY & ADDITIONAL TESTS: Reviewed.   INTERVAL HPI/OVERNIGHT EVENTS:  Patient desatted to 88 while sleeping so was placed on 3L NC. Pt tolerated well. Pt on Levophed. Levo attempted to wean down, Pt systolic BP dipped to 80. Levophed at .07mcg/kg/min. No BM.  SUBJECTIVE: Patient seen and examined at bedside. Intubated, sedated, ROS cannot be obtained.       VITAL SIGNS:  ICU Vital Signs Last 24 Hrs  T(C): 35.6 (05 Feb 2022 04:00), Max: 36.6 (04 Feb 2022 16:00)  T(F): 96.1 (05 Feb 2022 04:00), Max: 97.9 (04 Feb 2022 16:00)  HR: 82 (05 Feb 2022 07:00) (69 - 109)  BP: 105/91 (05 Feb 2022 07:00) (82/67 - 137/74)  BP(mean): 98 (05 Feb 2022 07:00) (60 - 98)  ABP: --  ABP(mean): --  RR: 23 (05 Feb 2022 07:00) (14 - 27)  SpO2: 94% (05 Feb 2022 07:00) (91% - 100%)      Plateau pressure:   P/F ratio:     02-04 @ 07:01  -  02-05 @ 07:00  --------------------------------------------------------  IN: 1985 mL / OUT: 1775 mL / NET: 210 mL      CAPILLARY BLOOD GLUCOSE      POCT Blood Glucose.: 261 mg/dL (05 Feb 2022 08:14)    ECG:    PHYSICAL EXAM:    General:   HEENT:   Neck:   Respiratory:   Cardiovascular:   Abdomen:   Extremities:  Neurological:    MEDICATIONS:  MEDICATIONS  (STANDING):  aspirin enteric coated 81 milliGRAM(s) Oral daily  atorvastatin 40 milliGRAM(s) Oral daily  budesonide  80 MICROgram(s)/formoterol 4.5 MICROgram(s) Inhaler 2 Puff(s) Inhalation two times a day  chlorhexidine 4% Liquid 1 Application(s) Topical <User Schedule>  dextrose 40% Gel 15 Gram(s) Oral once  dextrose 40% Gel 15 Gram(s) Oral once  dextrose 5%. 1000 milliLiter(s) (50 mL/Hr) IV Continuous <Continuous>  dextrose 50% Injectable 25 Gram(s) IV Push once  dextrose 50% Injectable 12.5 Gram(s) IV Push once  dextrose 50% Injectable 25 Gram(s) IV Push once  dextrose 50% Injectable 25 Gram(s) IV Push once  dextrose 50% Injectable 12.5 Gram(s) IV Push once  glucagon  Injectable 1 milliGRAM(s) IntraMuscular once  insulin lispro (ADMELOG) corrective regimen sliding scale   SubCutaneous three times a day before meals  insulin lispro (ADMELOG) corrective regimen sliding scale   SubCutaneous at bedtime  insulin lispro Injectable (ADMELOG) 7 Unit(s) SubCutaneous three times a day before meals  midodrine 10 milliGRAM(s) Oral every 8 hours  norepinephrine Infusion 0.05 MICROgram(s)/kG/Min (8.68 mL/Hr) IV Continuous <Continuous>  pantoprazole  Injectable 40 milliGRAM(s) IV Push two times a day  piperacillin/tazobactam IVPB.. 3.375 Gram(s) IV Intermittent every 12 hours  polyethylene glycol 3350 17 Gram(s) Oral daily  senna 2 Tablet(s) Oral at bedtime    MEDICATIONS  (PRN):      ALLERGIES:  Allergies    No Known Allergies    Intolerances        LABS:                        8.1    12.57 )-----------( 117      ( 05 Feb 2022 01:54 )             22.8     02-05    138  |  107  |  60<H>  ----------------------------<  155<H>  4.3   |  19<L>  |  1.42<H>    Ca    8.2<L>      05 Feb 2022 01:54  Phos  2.3     02-05  Mg     1.80     02-05    TPro  4.8<L>  /  Alb  2.3<L>  /  TBili  1.2  /  DBili  x   /  AST  20  /  ALT  7   /  AlkPhos  56  02-05    PT/INR - ( 04 Feb 2022 02:25 )   PT: 12.5 sec;   INR: 1.09 ratio         PTT - ( 04 Feb 2022 02:25 )  PTT:26.2 sec      RADIOLOGY & ADDITIONAL TESTS: Reviewed.   INTERVAL HPI/OVERNIGHT EVENTS:  Patient desatted to 88 while sleeping so was placed on 3L NC. Pt tolerated well. Pt on Levophed. Levo attempted to wean down, Pt systolic BP dipped to 80. Levophed at .07mcg/kg/min. No BM.  SUBJECTIVE: Patient seen and examined at bedside. Intubated, sedated, ROS cannot be obtained.       VITAL SIGNS:  ICU Vital Signs Last 24 Hrs  T(C): 35.6 (05 Feb 2022 04:00), Max: 36.6 (04 Feb 2022 16:00)  T(F): 96.1 (05 Feb 2022 04:00), Max: 97.9 (04 Feb 2022 16:00)  HR: 82 (05 Feb 2022 07:00) (69 - 109)  BP: 105/91 (05 Feb 2022 07:00) (82/67 - 137/74)  BP(mean): 98 (05 Feb 2022 07:00) (60 - 98)  ABP: --  ABP(mean): --  RR: 23 (05 Feb 2022 07:00) (14 - 27)  SpO2: 94% (05 Feb 2022 07:00) (91% - 100%)      Plateau pressure:   P/F ratio:     02-04 @ 07:01  -  02-05 @ 07:00  --------------------------------------------------------  IN: 1985 mL / OUT: 1775 mL / NET: 210 mL      CAPILLARY BLOOD GLUCOSE      POCT Blood Glucose.: 261 mg/dL (05 Feb 2022 08:14)    ECG:    PHYSICAL EXAM:    Gen: NAD, +tired-appearing  HEENT: NCAT, no conjunctival injection, no scleral icterus, +dry oral mucosa  Neck: no JVD, supple, no LAD, no thyromegaly  Resp: +LFNC, normal WOB at rest, CTAB anteriorly  CV: RRR, S1 and S2, no murmurs, no rubs, no gallops, 2+ radial pulses  Abd: nondistended, bowel sounds, soft, nontender, no rebound, no involuntary guarding, no organomegaly  Ext: no lower extremity edema  Neuro: alert, oriented x4  Access: 2 PIV 18g, 1 arrow LUE    MEDICATIONS:  MEDICATIONS  (STANDING):  aspirin enteric coated 81 milliGRAM(s) Oral daily  atorvastatin 40 milliGRAM(s) Oral daily  budesonide  80 MICROgram(s)/formoterol 4.5 MICROgram(s) Inhaler 2 Puff(s) Inhalation two times a day  chlorhexidine 4% Liquid 1 Application(s) Topical <User Schedule>  dextrose 40% Gel 15 Gram(s) Oral once  dextrose 40% Gel 15 Gram(s) Oral once  dextrose 5%. 1000 milliLiter(s) (50 mL/Hr) IV Continuous <Continuous>  dextrose 50% Injectable 25 Gram(s) IV Push once  dextrose 50% Injectable 12.5 Gram(s) IV Push once  dextrose 50% Injectable 25 Gram(s) IV Push once  dextrose 50% Injectable 25 Gram(s) IV Push once  dextrose 50% Injectable 12.5 Gram(s) IV Push once  glucagon  Injectable 1 milliGRAM(s) IntraMuscular once  insulin lispro (ADMELOG) corrective regimen sliding scale   SubCutaneous three times a day before meals  insulin lispro (ADMELOG) corrective regimen sliding scale   SubCutaneous at bedtime  insulin lispro Injectable (ADMELOG) 7 Unit(s) SubCutaneous three times a day before meals  midodrine 10 milliGRAM(s) Oral every 8 hours  norepinephrine Infusion 0.05 MICROgram(s)/kG/Min (8.68 mL/Hr) IV Continuous <Continuous>  pantoprazole  Injectable 40 milliGRAM(s) IV Push two times a day  piperacillin/tazobactam IVPB.. 3.375 Gram(s) IV Intermittent every 12 hours  polyethylene glycol 3350 17 Gram(s) Oral daily  senna 2 Tablet(s) Oral at bedtime    MEDICATIONS  (PRN):      ALLERGIES:  Allergies    No Known Allergies    Intolerances        LABS:                        8.1    12.57 )-----------( 117      ( 05 Feb 2022 01:54 )             22.8     02-05    138  |  107  |  60<H>  ----------------------------<  155<H>  4.3   |  19<L>  |  1.42<H>    Ca    8.2<L>      05 Feb 2022 01:54  Phos  2.3     02-05  Mg     1.80     02-05    TPro  4.8<L>  /  Alb  2.3<L>  /  TBili  1.2  /  DBili  x   /  AST  20  /  ALT  7   /  AlkPhos  56  02-05    PT/INR - ( 04 Feb 2022 02:25 )   PT: 12.5 sec;   INR: 1.09 ratio         PTT - ( 04 Feb 2022 02:25 )  PTT:26.2 sec      RADIOLOGY & ADDITIONAL TESTS: Reviewed.

## 2022-02-05 NOTE — PROGRESS NOTE ADULT - SUBJECTIVE AND OBJECTIVE BOX
[facility] Division of Kidney Diseases & Hypertension  FOLLOW UP NOTE  [phone]--------------------------------------------------------------------------------  Chief Complaint: DUSTIN    HPI: 76-year-old male with PMH of HFrEF, CAD, COPD, HTN and DM type 2 presented to [facility] from nursing home for abdominal pain and coffee ground emesis. Admitted to MICU for UGIB. Noted to be hypotensive, blood pressure was as low as 55/32, started on Levophed. Initial Hb also low 5.6, received 3 units pRBC transfusion. Nephrology following for DUSTIN. On review of labs on [facility] HIE/[facility], patient noted to have elevated Scr of 3.25 on admission on 2/22. Scr improved to 1.4 today.     Patient was seen and examined at bedside. Appears comfortable. Denies CP or SOB. Weaning down pressors. No GIB noted overnight.               PAST HISTORY  --------------------------------------------------------------------------------  No significant changes to PMH, PSH, FHx, SHx, unless otherwise noted    ALLERGIES & MEDICATIONS  --------------------------------------------------------------------------------  Allergies    No Known Allergies    Intolerances      Standing Inpatient Medications  atorvastatin 40 milliGRAM(s) Oral daily  budesonide  80 MICROgram(s)/formoterol 4.5 MICROgram(s) Inhaler 2 Puff(s) Inhalation two times a day  chlorhexidine 4% Liquid 1 Application(s) Topical <User Schedule>  dextrose 40% Gel 15 Gram(s) Oral once  dextrose 40% Gel 15 Gram(s) Oral once  dextrose 5%. 1000 milliLiter(s) IV Continuous <Continuous>  dextrose 50% Injectable 25 Gram(s) IV Push once  dextrose 50% Injectable 12.5 Gram(s) IV Push once  dextrose 50% Injectable 25 Gram(s) IV Push once  dextrose 50% Injectable 25 Gram(s) IV Push once  dextrose 50% Injectable 12.5 Gram(s) IV Push once  glucagon  Injectable 1 milliGRAM(s) IntraMuscular once  insulin lispro (ADMELOG) corrective regimen sliding scale   SubCutaneous three times a day before meals  insulin lispro (ADMELOG) corrective regimen sliding scale   SubCutaneous at bedtime  insulin lispro Injectable (ADMELOG) 7 Unit(s) SubCutaneous three times a day before meals  midodrine 20 milliGRAM(s) Oral every 8 hours  norepinephrine Infusion 0.05 MICROgram(s)/kG/Min IV Continuous <Continuous>  pantoprazole  Injectable 40 milliGRAM(s) IV Push two times a day  piperacillin/tazobactam IVPB.. 3.375 Gram(s) IV Intermittent every 12 hours  polyethylene glycol 3350 17 Gram(s) Oral daily  senna 2 Tablet(s) Oral at bedtime    PRN Inpatient Medications      REVIEW OF SYSTEMS  --------------------------------------------------------------------------------  Gen: No  fevers/chills  Respiratory: No dyspnea, cough  CV: No chest pain  GI: No abdominal pain, diarrhea,  nausea, vomiting  : No increased frequency, dysuria, hematuria  MSK:  no edema  Neuro: No dizziness/lightheadedness      All other systems were reviewed and are negative, except as noted.    VITALS/PHYSICAL EXAM  --------------------------------------------------------------------------------  T(C): 36.4 (02-05-22 @ 08:00), Max: 36.6 (02-04-22 @ 16:00)  HR: 83 (02-05-22 @ 09:59) (69 - 109)  BP: 80/68 (02-05-22 @ 09:00) (80/68 - 136/63)  RR: 24 (02-05-22 @ 09:00) (14 - 27)  SpO2: 96% (02-05-22 @ 09:59) (91% - 100%)  Wt(kg): --        02-04-22 @ 07:01  -  02-05-22 @ 07:00  --------------------------------------------------------  IN: 1985 mL / OUT: 1775 mL / NET: 210 mL    02-05-22 @ 07:01  -  02-05-22 @ 10:13  --------------------------------------------------------  IN: 150 mL / OUT: 150 mL / NET: 0 mL      Physical Exam:  	Gen: NAD  	HEENT: MMM  	Pulm: CTA B/L, no crackles   	CV: S1S2  	Abd: Soft, +BS   	Ext: No LE edema B/L  	Neuro: Awake  	Skin: Warm and dry  	        LABS/STUDIES  --------------------------------------------------------------------------------              8.1    12.57 >-----------<  117      [02-05-22 @ 01:54]              22.8     138  |  107  |  60  ----------------------------<  155      [02-05-22 @ 01:54]  4.3   |  19  |  1.42        Ca     8.2     [02-05-22 @ 01:54]      Mg     1.80     [02-05-22 @ 01:54]      Phos  2.3     [02-05-22 @ 01:54]    TPro  4.8  /  Alb  2.3  /  TBili  1.2  /  DBili  x   /  AST  20  /  ALT  7   /  AlkPhos  56  [02-05-22 @ 01:54]    PT/INR: PT 12.5 , INR 1.09       [02-04-22 @ 02:25]  PTT: 26.2       [02-04-22 @ 02:25]      Creatinine Trend:  SCr 1.42 [02-05 @ 01:54]  SCr 1.50 [02-04 @ 17:08]  SCr 1.71 [02-04 @ 09:08]  SCr 1.81 [02-04 @ 08:10]  SCr 1.93 [02-04 @ 02:03]    Urinalysis - [02-03-22 @ 05:53]      Color Light Yellow / Appearance Slightly Turbid / SG 1.016 / pH 5.0      Gluc Trace / Ketone Small  / Bili Negative / Urobili <2 mg/dL       Blood Small / Protein Negative / Leuk Est Large / Nitrite Negative      RBC 0 / WBC 49 / Hyaline 2 / Gran  / Sq Epi  / Non Sq Epi Moderate / Bacteria Few      TSH 2.99      [02-03-22 @ 02:57]       [facility] Division of Kidney Diseases & Hypertension  FOLLOW UP NOTE  [phone]--------------------------------------------------------------------------------  Chief Complaint: DUSTIN    HPI: 76-year-old male with PMH of HFrEF, CAD, COPD, HTN and DM type 2 presented to [facility] from nursing home for abdominal pain and coffee ground emesis. Admitted to MICU for UGIB. Noted to be hypotensive, blood pressure was as low as 55/32, started on Levophed. Initial Hb also low 5.6, received 3 units pRBC transfusion. Nephrology following for DUSTIN. On review of labs on [facility] HIE/[place], patient noted to have elevated Scr of 3.25 on admission on 2/22. Scr improved to 1.4 today.     Patient was seen and examined at bedside. Appears comfortable. Denies CP or SOB. Weaning down pressors. No GIB noted overnight.     PAST HISTORY  --------------------------------------------------------------------------------  No significant changes to PMH, PSH, FHx, SHx, unless otherwise noted    ALLERGIES & MEDICATIONS  --------------------------------------------------------------------------------  Allergies    No Known Allergies    Intolerances      Standing Inpatient Medications  atorvastatin 40 milliGRAM(s) Oral daily  budesonide  80 MICROgram(s)/formoterol 4.5 MICROgram(s) Inhaler 2 Puff(s) Inhalation two times a day  chlorhexidine 4% Liquid 1 Application(s) Topical <User Schedule>  dextrose 40% Gel 15 Gram(s) Oral once  dextrose 40% Gel 15 Gram(s) Oral once  dextrose 5%. 1000 milliLiter(s) IV Continuous <Continuous>  dextrose 50% Injectable 25 Gram(s) IV Push once  dextrose 50% Injectable 12.5 Gram(s) IV Push once  dextrose 50% Injectable 25 Gram(s) IV Push once  dextrose 50% Injectable 25 Gram(s) IV Push once  dextrose 50% Injectable 12.5 Gram(s) IV Push once  glucagon  Injectable 1 milliGRAM(s) IntraMuscular once  insulin lispro (ADMELOG) corrective regimen sliding scale   SubCutaneous three times a day before meals  insulin lispro (ADMELOG) corrective regimen sliding scale   SubCutaneous at bedtime  insulin lispro Injectable (ADMELOG) 7 Unit(s) SubCutaneous three times a day before meals  midodrine 20 milliGRAM(s) Oral every 8 hours  norepinephrine Infusion 0.05 MICROgram(s)/kG/Min IV Continuous <Continuous>  pantoprazole  Injectable 40 milliGRAM(s) IV Push two times a day  piperacillin/tazobactam IVPB.. 3.375 Gram(s) IV Intermittent every 12 hours  polyethylene glycol 3350 17 Gram(s) Oral daily  senna 2 Tablet(s) Oral at bedtime    PRN Inpatient Medications      REVIEW OF SYSTEMS  --------------------------------------------------------------------------------  Gen: No  fevers/chills  Respiratory: No dyspnea, cough  CV: No chest pain  GI: No abdominal pain, diarrhea,  nausea, vomiting  : No increased frequency, dysuria, hematuria  MSK:  no edema  Neuro: No dizziness/lightheadedness      All other systems were reviewed and are negative, except as noted.    VITALS/PHYSICAL EXAM  --------------------------------------------------------------------------------  T(C): 36.4 (02-05-22 @ 08:00), Max: 36.6 (02-04-22 @ 16:00)  HR: 83 (02-05-22 @ 09:59) (69 - 109)  BP: 80/68 (02-05-22 @ 09:00) (80/68 - 136/63)  RR: 24 (02-05-22 @ 09:00) (14 - 27)  SpO2: 96% (02-05-22 @ 09:59) (91% - 100%)  Wt(kg): --        02-04-22 @ 07:01  -  02-05-22 @ 07:00  --------------------------------------------------------  IN: 1985 mL / OUT: 1775 mL / NET: 210 mL    02-05-22 @ 07:01  -  02-05-22 @ 10:13  --------------------------------------------------------  IN: 150 mL / OUT: 150 mL / NET: 0 mL      Physical Exam:  	Gen: NAD  	HEENT: MMM  	Pulm: CTA B/L, no crackles   	CV: S1S2  	Abd: Soft, +BS   	Ext: No LE edema B/L  	Neuro: Awake  	Skin: Warm and dry  	  LABS/STUDIES  --------------------------------------------------------------------------------              8.1    12.57 >-----------<  117      [02-05-22 @ 01:54]              22.8     138  |  107  |  60  ----------------------------<  155      [02-05-22 @ 01:54]  4.3   |  19  |  1.42        Ca     8.2     [02-05-22 @ 01:54]      Mg     1.80     [02-05-22 @ 01:54]      Phos  2.3     [02-05-22 @ 01:54]    TPro  4.8  /  Alb  2.3  /  TBili  1.2  /  DBili  x   /  AST  20  /  ALT  7   /  AlkPhos  56  [02-05-22 @ 01:54]    PT/INR: PT 12.5 , INR 1.09       [02-04-22 @ 02:25]  PTT: 26.2       [02-04-22 @ 02:25]      Creatinine Trend:  SCr 1.42 [02-05 @ 01:54]  SCr 1.50 [02-04 @ 17:08]  SCr 1.71 [02-04 @ 09:08]  SCr 1.81 [02-04 @ 08:10]  SCr 1.93 [02-04 @ 02:03]    Urinalysis - [02-03-22 @ 05:53]      Color Light Yellow / Appearance Slightly Turbid / SG 1.016 / pH 5.0      Gluc Trace / Ketone Small  / Bili Negative / Urobili <2 mg/dL       Blood Small / Protein Negative / Leuk Est Large / Nitrite Negative      RBC 0 / WBC 49 / Hyaline 2 / Gran  / Sq Epi  / Non Sq Epi Moderate / Bacteria Few      TSH 2.99      [02-03-22 @ 02:57]

## 2022-02-05 NOTE — PROGRESS NOTE ADULT - PROBLEM SELECTOR PLAN 1
Pt. with hemodynamically mediated DUSTIN/ ATN in setting of hemorrhagic shock. Exact duration of DUSTIN however unknown. Scr elevated at 3.25 on admission on 2/22. Scr improved to 1.4 today. Pt. non-oliguric, urine output ~1.7 L over 24h period. Pt. on IV vasopressor support and on IVFs. Monitor labs and urine output. Avoid NSAIDs, ACEI/ARBS, RCA and nephrotoxins. Dose medications as per eGFR.

## 2022-02-06 LAB
ALBUMIN SERPL ELPH-MCNC: 2.4 G/DL — LOW (ref 3.3–5)
ALP SERPL-CCNC: 62 U/L — SIGNIFICANT CHANGE UP (ref 40–120)
ALT FLD-CCNC: 7 U/L — SIGNIFICANT CHANGE UP (ref 4–41)
ANION GAP SERPL CALC-SCNC: 14 MMOL/L — SIGNIFICANT CHANGE UP (ref 7–14)
AST SERPL-CCNC: 19 U/L — SIGNIFICANT CHANGE UP (ref 4–40)
BASOPHILS # BLD AUTO: 0.01 K/UL — SIGNIFICANT CHANGE UP (ref 0–0.2)
BASOPHILS NFR BLD AUTO: 0.1 % — SIGNIFICANT CHANGE UP (ref 0–2)
BILIRUB SERPL-MCNC: 1.4 MG/DL — HIGH (ref 0.2–1.2)
BUN SERPL-MCNC: 44 MG/DL — HIGH (ref 7–23)
CALCIUM SERPL-MCNC: 8.4 MG/DL — SIGNIFICANT CHANGE UP (ref 8.4–10.5)
CHLORIDE SERPL-SCNC: 103 MMOL/L — SIGNIFICANT CHANGE UP (ref 98–107)
CO2 SERPL-SCNC: 19 MMOL/L — LOW (ref 22–31)
CREAT SERPL-MCNC: 1.36 MG/DL — HIGH (ref 0.5–1.3)
CULTURE RESULTS: NO GROWTH — SIGNIFICANT CHANGE UP
EOSINOPHIL # BLD AUTO: 0.01 K/UL — SIGNIFICANT CHANGE UP (ref 0–0.5)
EOSINOPHIL NFR BLD AUTO: 0.1 % — SIGNIFICANT CHANGE UP (ref 0–6)
GLUCOSE BLDC GLUCOMTR-MCNC: 134 MG/DL — HIGH (ref 70–99)
GLUCOSE BLDC GLUCOMTR-MCNC: 157 MG/DL — HIGH (ref 70–99)
GLUCOSE BLDC GLUCOMTR-MCNC: 79 MG/DL — SIGNIFICANT CHANGE UP (ref 70–99)
GLUCOSE BLDC GLUCOMTR-MCNC: 84 MG/DL — SIGNIFICANT CHANGE UP (ref 70–99)
GLUCOSE SERPL-MCNC: 79 MG/DL — SIGNIFICANT CHANGE UP (ref 70–99)
HCT VFR BLD CALC: 23.6 % — LOW (ref 39–50)
HGB BLD-MCNC: 8.2 G/DL — LOW (ref 13–17)
IANC: 8.91 K/UL — HIGH (ref 1.5–8.5)
IMM GRANULOCYTES NFR BLD AUTO: 1.3 % — SIGNIFICANT CHANGE UP (ref 0–1.5)
LYMPHOCYTES # BLD AUTO: 1.46 K/UL — SIGNIFICANT CHANGE UP (ref 1–3.3)
LYMPHOCYTES # BLD AUTO: 12.2 % — LOW (ref 13–44)
MAGNESIUM SERPL-MCNC: 1.4 MG/DL — LOW (ref 1.6–2.6)
MCHC RBC-ENTMCNC: 29.9 PG — SIGNIFICANT CHANGE UP (ref 27–34)
MCHC RBC-ENTMCNC: 34.7 GM/DL — SIGNIFICANT CHANGE UP (ref 32–36)
MCV RBC AUTO: 86.1 FL — SIGNIFICANT CHANGE UP (ref 80–100)
MONOCYTES # BLD AUTO: 1.37 K/UL — HIGH (ref 0–0.9)
MONOCYTES NFR BLD AUTO: 11.5 % — SIGNIFICANT CHANGE UP (ref 2–14)
NEUTROPHILS # BLD AUTO: 8.91 K/UL — HIGH (ref 1.8–7.4)
NEUTROPHILS NFR BLD AUTO: 74.8 % — SIGNIFICANT CHANGE UP (ref 43–77)
NRBC # BLD: 0 /100 WBCS — SIGNIFICANT CHANGE UP
NRBC # FLD: 0.02 K/UL — HIGH
PHOSPHATE SERPL-MCNC: 2.8 MG/DL — SIGNIFICANT CHANGE UP (ref 2.5–4.5)
PLATELET # BLD AUTO: 131 K/UL — LOW (ref 150–400)
POTASSIUM SERPL-MCNC: 4.4 MMOL/L — SIGNIFICANT CHANGE UP (ref 3.5–5.3)
POTASSIUM SERPL-SCNC: 4.4 MMOL/L — SIGNIFICANT CHANGE UP (ref 3.5–5.3)
PROT SERPL-MCNC: 5.1 G/DL — LOW (ref 6–8.3)
RBC # BLD: 2.74 M/UL — LOW (ref 4.2–5.8)
RBC # FLD: 15.7 % — HIGH (ref 10.3–14.5)
SODIUM SERPL-SCNC: 136 MMOL/L — SIGNIFICANT CHANGE UP (ref 135–145)
SPECIMEN SOURCE: SIGNIFICANT CHANGE UP
VANCOMYCIN FLD-MCNC: 14.9 UG/ML — SIGNIFICANT CHANGE UP
WBC # BLD: 11.92 K/UL — HIGH (ref 3.8–10.5)
WBC # FLD AUTO: 11.92 K/UL — HIGH (ref 3.8–10.5)

## 2022-02-06 PROCEDURE — 99291 CRITICAL CARE FIRST HOUR: CPT

## 2022-02-06 RX ORDER — MUPIROCIN 20 MG/G
1 OINTMENT TOPICAL
Refills: 0 | Status: DISCONTINUED | OUTPATIENT
Start: 2022-02-06 | End: 2022-02-06

## 2022-02-06 RX ORDER — MUPIROCIN 20 MG/G
1 OINTMENT TOPICAL
Refills: 0 | Status: COMPLETED | OUTPATIENT
Start: 2022-02-06 | End: 2022-02-10

## 2022-02-06 RX ORDER — MAGNESIUM SULFATE 500 MG/ML
1 VIAL (ML) INJECTION ONCE
Refills: 0 | Status: COMPLETED | OUTPATIENT
Start: 2022-02-06 | End: 2022-02-06

## 2022-02-06 RX ADMIN — CHLORHEXIDINE GLUCONATE 1 APPLICATION(S): 213 SOLUTION TOPICAL at 08:27

## 2022-02-06 RX ADMIN — Medication 0: at 21:29

## 2022-02-06 RX ADMIN — SCOPALAMINE 1 PATCH: 1 PATCH, EXTENDED RELEASE TRANSDERMAL at 08:08

## 2022-02-06 RX ADMIN — BUDESONIDE AND FORMOTEROL FUMARATE DIHYDRATE 2 PUFF(S): 160; 4.5 AEROSOL RESPIRATORY (INHALATION) at 20:00

## 2022-02-06 RX ADMIN — PANTOPRAZOLE SODIUM 40 MILLIGRAM(S): 20 TABLET, DELAYED RELEASE ORAL at 18:28

## 2022-02-06 RX ADMIN — POLYETHYLENE GLYCOL 3350 17 GRAM(S): 17 POWDER, FOR SOLUTION ORAL at 12:15

## 2022-02-06 RX ADMIN — SCOPALAMINE 1 PATCH: 1 PATCH, EXTENDED RELEASE TRANSDERMAL at 19:02

## 2022-02-06 RX ADMIN — SCOPALAMINE 1 PATCH: 1 PATCH, EXTENDED RELEASE TRANSDERMAL at 00:16

## 2022-02-06 RX ADMIN — Medication 7 UNIT(S): at 12:16

## 2022-02-06 RX ADMIN — MUPIROCIN 1 APPLICATION(S): 20 OINTMENT TOPICAL at 05:46

## 2022-02-06 RX ADMIN — Medication 2: at 12:16

## 2022-02-06 RX ADMIN — Medication 100 GRAM(S): at 06:54

## 2022-02-06 RX ADMIN — MIDODRINE HYDROCHLORIDE 20 MILLIGRAM(S): 2.5 TABLET ORAL at 21:28

## 2022-02-06 RX ADMIN — MUPIROCIN 1 APPLICATION(S): 20 OINTMENT TOPICAL at 18:28

## 2022-02-06 RX ADMIN — PIPERACILLIN AND TAZOBACTAM 25 GRAM(S): 4; .5 INJECTION, POWDER, LYOPHILIZED, FOR SOLUTION INTRAVENOUS at 03:19

## 2022-02-06 RX ADMIN — PIPERACILLIN AND TAZOBACTAM 25 GRAM(S): 4; .5 INJECTION, POWDER, LYOPHILIZED, FOR SOLUTION INTRAVENOUS at 16:27

## 2022-02-06 RX ADMIN — MIDODRINE HYDROCHLORIDE 20 MILLIGRAM(S): 2.5 TABLET ORAL at 05:45

## 2022-02-06 RX ADMIN — MIDODRINE HYDROCHLORIDE 20 MILLIGRAM(S): 2.5 TABLET ORAL at 14:33

## 2022-02-06 RX ADMIN — ATORVASTATIN CALCIUM 40 MILLIGRAM(S): 80 TABLET, FILM COATED ORAL at 12:15

## 2022-02-06 RX ADMIN — BUDESONIDE AND FORMOTEROL FUMARATE DIHYDRATE 2 PUFF(S): 160; 4.5 AEROSOL RESPIRATORY (INHALATION) at 06:49

## 2022-02-06 RX ADMIN — PANTOPRAZOLE SODIUM 40 MILLIGRAM(S): 20 TABLET, DELAYED RELEASE ORAL at 05:46

## 2022-02-06 RX ADMIN — Medication 7 UNIT(S): at 08:00

## 2022-02-06 RX ADMIN — SENNA PLUS 2 TABLET(S): 8.6 TABLET ORAL at 21:28

## 2022-02-06 NOTE — PROGRESS NOTE ADULT - ASSESSMENT
76M with PMHx HFrEF (TTE 8/2011 showed moderate segmental LVSD), CAD s/p CABG, HTN, DM.  Presented from [facility] with abd pain and coffee ground emesis. Found to be hypotensive, anemic to Hb 5.4, with DUSTIN, and with c/f UGIB.  Course now complicated worsening acidosis with high AG     NEURO/PSYCH    - holding home citalopram  -aaox4     GI/NUTRITION    #Coffee ground emesis  Likely UGIB. BUN 165Ho on admission.  downtrending   - pantoprazole 40mg BID   - holding off on EGD per GI   -diet as tolerated  -bowel regimen     #Abd pain  Likely 2/2 UGIB. POCUS showed cholelithiasis.  Abdominal ultrasound with biliary sludge and cholelithiasis. no acute shellie       #GERD  - holding home omeprazole    ENDO    -DKA reoslved  -on lantus 20, admelog 7uTID   -continue to monitor BMP  -carb ocnsistent diet     NEPHRO//METABOLIC/VOLUME    #DUSTIN  Likely prerenal. BUN 165H.  - Hung  -resolving     #HAGMA   -resolved   -anion gap resolved       HEME/ONC    #Anemia  Likely 2/2 UGIB.  - Hb goal >=8  - s/p pRBCs  - CBC daily   - maintain active T&S    #Thrombocytopenia  Likely reactive.    #VTE ppx: SCDs    CV    #Shock  Possibly 2/2 infection, UGIB,   - norepi  - s/p IVF  - empiric Zosyn, dosing vanc by level   -1 g vanc overnight   - holding home carvedilol  -increase midodrine to 20tid and wean norepi     #HFrEF, CAD, troponinemia   Potential demand ischemia. Last documented LVEF in [employer] was 27%. HsenTnT 1320H.  - TTE w/ no comment on EF.   -cards says troponin is likely demand   -no acute intervention per cardiology now     PULM    -restart home Symbicort   -saturating well on RA     ETHICS    - full code    ID    #Neutrophilic leukocytosis  Likely 2/2 infection. Procal 3.21H.  - s/p IVF  - empiric Zosyn  -MRSA positive, given 1 dose of vanc on 2/3/22.  - f/u ucx and bcx  -will get CXR    76M with PMHx HFrEF (TTE 8/2011 showed moderate segmental LVSD), CAD s/p CABG, HTN, DM. Presented from [facility] with abd pain and coffee ground emesis. Found to be hypotensive requiring levophed, anemic to Hb 5.4, with DUSTIN, and with c/f UGIB, s/p 3u PRBC. Course complicated by worsening high AG acidosis and DKA, s/p insulin gtt. Pt now off pressors and hemodynamically stable for medical floors.    NEURO/PSYCH:  - Holding home citalopram  - AOx4, mentating well    CV:  #Shock  UGIB vs  infection   - Off levophed   - s/p IVF resuscitation.  - s/p empiric zosyn and vanc by level  - Holding home carvedilol  - Continue midodrine to 20 TID, monitor for bradycardia      #HFrEF, CAD, troponinemia (1320--> 1192)  Potential demand ischemia. Last documented LVEF in [employer] was 27%  - TTE 2/3:  unable to assess EF/LV function.  moderate mitral stenosis. Calcified trileaflet aortic valve with mildly decreased, Grossly the anterolateral and possibly the mid to distal  segments of the anterior wall are hypokinetic. The right ventricle is not well visualized.  - Cardiology consult appreciated. Likely demand ischemia.    - No acute intervention per cardiology now     PULM:  #Hx COPD  - Restart home Symbicort   - Saturating well on RA    GI/NUTRITION:  #Coffee ground emesis  Likely UGIB.  on admission.  Downtrending, 44 today  - Cont Pantoprazole 40mg BID   - Holding off on EGD per GI   - Diet as tolerated  - Cont Bowel regimen     #Abd pain  Likely 2/2 UGIB. POCUS showed cholelithiasis.  Abdominal ultrasound with biliary sludge and cholelithiasis. No acute shellie.    #GERD  - on pantoprazole 40 IV BID  - takes omeprazole at home    ENDO:  - DKA resolved  - On admelog 7u TID and ISS   - Continue to monitor BMP  - Carb consistent diet    NEPHRO//METABOLIC/VOLUME:  #DUSTIN - improving  Likely prerenal/hemodynamically related 2/2 hypotension in setting of GIB/ anemia / DKA  - Cr 3.25 on admission, 1.36 this AM  - ---> 44   - Hung  - Cont to monitor urine output    #HAGMA - resolved    ID:  #Neutrophilic leukocytosis  Likely 2/2 infection. Procal 3.21 (2/3) , WBC 13.7 (2/3)---> 11.92 today, pt has been afebrile for entirety of MICU stay.  - s/p IVFs  - cont empiric Zosyn ( started on 2/3 for 7 day course)  - MRSA screen positive (2/3) ,was given 1 dose of vanc on 2/3 and was started on mupirocin x5 day course.   - BCx 2/3- neg  - UA 2/3 large leuks, small ketones,  UCx 2/5- neg     HEME/ONC:  #Anemia   Likely 2/2 UGIB.   - Hb 5.6 on admission, received 3U pRBCs on 2/2. Hb has remained stable 8-9 since then. Hb today 8.2.  - Hb goal >=8  - CBC daily   - Maintain active T&S    #Thrombocytopenia  Likely reactive.  -  today      #VTE ppx: SCDs    ETHICS: Full code

## 2022-02-06 NOTE — CHART NOTE - NSCHARTNOTEFT_GEN_A_CORE
MICU Transfer Note    Transfer from: MICU    Transfer to: (x) Medicine    (  ) Telemetry    (  ) RCU                               (  ) Palliative    (  ) Stroke Unit    (  ) MICU    (  ) __________________    Accepting Physician:    Signout given to:     HPI / MICU COURSE:  76M PMHx CHF (TTE 8/2011 demonstrating moderate segmental LV systolic dysfunction), CAD, COPD, HTN, T2DM presents from [facility] with abdominal pain and coffee ground emesis. Patient states that he started experiencing acute onset of R lower quadrant abdominal pain x 1 week, described as a 7/10 intermittent, sharp, non-radiating pain that is made better with consumption of liquid diet. He also endorses 1-2 episodes of pale green and clear emesis daily within this 1-week time frame. Note that per documentation accompanying patient from NH, patient was being transferred for evaluation of GI bleed and hypotension.    In the ED:  VS: BP /47-66, HR 89-91, RR 17-26, O2 Sat  on 2L, Tmax 97.9  Labs: CBC - WBC 13.72, Hgb 5.6 (baseline is 17.3 in 2015), platelets 123. CMP - Na 128, /3.25 (baseline 1.69 9/2011); AST/ALT 58/10, otherwise normal. VBG 7.37/29/32/17/lactate 4.8.  Patient was given: Pantoprazole 80, pRBC 2U, ordered for pantoprazole drip and given one additional unit of pRBC. Patient was noted to be intermittently hypotensive despite fluid resuscitation, was admitted to MICU.    Pt transferred to MICU for shock requiring vasopressor support for multi-factorial shock although likely hemorrhagic shock 2/2 UGIB requiring vasopressor support. H/H stabilized w/o addition transfusions required. No further episode of coffee ground emesis or other overt signs of bleeding. GI consulted, no acute EGD done, maintained on protonix BID. Course c/b now resolved DKA and improving multi-factorial DUSTIN. Started on empiric zosyn. Start on midodrine and titrated off vasopressor. Pt not toxic appearing and stable for transfer to floors.     Assessment/Plan:    NEURO/PSYCH:  - Holding home citalopram  - AOx4, mentating well    CV:  #Shock  UGIB vs  infection   - Off levophed   - s/p IVF resuscitation.  - s/p empiric zosyn and vanc by level  - Holding home carvedilol  - Continue midodrine to 20 TID, monitor for bradycardia      #HFrEF, CAD, troponinemia (1320--> 1192)  Potential demand ischemia. Last documented LVEF in [employer] was 27%  - TTE 2/3:  unable to assess EF/LV function.  moderate mitral stenosis. Calcified trileaflet aortic valve with mildly decreased, Grossly the anterolateral and possibly the mid to distal  segments of the anterior wall are hypokinetic. The right ventricle is not well visualized.  - Cardiology consult appreciated. Likely demand ischemia.    - No acute intervention per cardiology now     PULM:  #Hx COPD  - Restart home Symbicort   - Saturating well on RA    GI/NUTRITION:  #Coffee ground emesis  Likely UGIB.  on admission.  Downtrending, 44 today  - Cont Pantoprazole 40mg BID   - Holding off on EGD per GI   - Diet as tolerated  - Cont Bowel regimen     #Abd pain  Likely 2/2 UGIB. POCUS showed cholelithiasis.  Abdominal ultrasound with biliary sludge and cholelithiasis. No acute shellie.    #GERD  - on pantoprazole 40 IV BID  - takes omeprazole at home    ENDO:  - DKA resolved  - On admelog 7u TID and ISS   - Continue to monitor BMP  - Carb consistent diet    NEPHRO//METABOLIC/VOLUME:  #DUSTIN - improving  Likely prerenal/hemodynamically related 2/2 hypotension in setting of GIB/ anemia / DKA  - Cr 3.25 on admission, 1.36 this AM  - ---> 44   - Hung  - Cont to monitor urine output    #HAGMA - resolved    ID:  #Neutrophilic leukocytosis  Likely 2/2 infection. Procal 3.21 (2/3) , WBC 13.7 (2/3)---> 11.92 today, pt has been afebrile for entirety of MICU stay.  - s/p IVFs  - cont empiric Zosyn ( started on 2/3 for 7 day course)  - MRSA screen positive (2/3) ,was given 1 dose of vanc on 2/3 and was started on mupirocin x5 day course.   - BCx 2/3- neg  - UA 2/3 large leuks, small ketones,  UCx 2/5- neg       HEME/ONC:  #Anemia   Likely 2/2 UGIB.   - Hb 5.6 on admission, received 3U pRBCs on 2/2. Hb has remained stable 8-9 since then. Hb today 8.2.  - Hb goal >=8  - CBC daily   - Maintain active T&S    #Thrombocytopenia  Likely reactive.  -  today    #VTE ppx: SCDs      ETHICS: Full code      FOR FOLLOW UP:  [ ]F/U GI recs  [ ]Continue to trend CBC and signs of bleeding. Transfuse prn to maintain Hgb >8.  [ ]cont to monitor urine output and renal function for resolving DUSTIN MICU Transfer Note    Transfer from: MICU    Transfer to: (x) Medicine    (  ) Telemetry    (  ) RCU                               (  ) Palliative    (  ) Stroke Unit    (  ) MICU    (  ) __________________    Accepting Physician:    Signout given to:     HPI / MICU COURSE:  76M PMHx CHF (TTE 8/2011 demonstrating moderate segmental LV systolic dysfunction), CAD, COPD, HTN, T2DM presents from [facility] with abdominal pain and coffee ground emesis. Patient states that he started experiencing acute onset of R lower quadrant abdominal pain x 1 week, described as a 7/10 intermittent, sharp, non-radiating pain that is made better with consumption of liquid diet. He also endorses 1-2 episodes of pale green and clear emesis daily within this 1-week time frame. Note that per documentation accompanying patient from NH, patient was being transferred for evaluation of GI bleed and hypotension.    In the ED:  VS: BP /47-66, HR 89-91, RR 17-26, O2 Sat  on 2L, Tmax 97.9  Labs: CBC - WBC 13.72, Hgb 5.6 (baseline is 17.3 in 2015), platelets 123. CMP - Na 128, /3.25 (baseline 1.69 9/2011); AST/ALT 58/10, otherwise normal. VBG 7.37/29/32/17/lactate 4.8.  Patient was given: Pantoprazole 80, pRBC 2U, ordered for pantoprazole drip and given one additional unit of pRBC. Patient was noted to be intermittently hypotensive despite fluid resuscitation, was admitted to MICU.    Pt transferred to MICU for shock requiring vasopressor support for multi-factorial shock although likely hemorrhagic shock 2/2 UGIB requiring vasopressor support. H/H stabilized w/o addition transfusions required. No further episode of coffee ground emesis or other overt signs of bleeding. GI consulted, no acute EGD done, maintained on protonix BID. Course c/b now resolved DKA and improving multi-factorial DUSTIN. Started on empiric zosyn. Start on midodrine and titrated off vasopressor. Pt not toxic appearing and stable for transfer to floors.     Assessment/Plan:    76M with PMHx HFrEF (TTE 8/2011 showed moderate segmental LVSD), CAD s/p CABG, HTN, DM. Presented from [facility] with abd pain and coffee ground emesis. Found to be hypotensive requiring levophed, anemic to Hb 5.4, with DUSTIN, and with c/f UGIB, s/p 3u PRBC. Course complicated by worsening high AG acidosis and DKA, s/p insulin gtt. Pt now off pressors and hemodynamically stable for medical floors.    NEURO/PSYCH:  - Holding home citalopram  - AOx4, mentating well    CV:  #Shock  UGIB vs  infection   - Off levophed   - s/p IVF resuscitation.  - s/p empiric zosyn and vanc by level  - Holding home carvedilol  - Continue midodrine to 20 TID, monitor for bradycardia      #HFrEF, CAD, troponinemia (1320--> 1192)  Potential demand ischemia. Last documented LVEF in [employer] was 27%  - TTE 2/3:  unable to assess EF/LV function.  moderate mitral stenosis. Calcified trileaflet aortic valve with mildly decreased, Grossly the anterolateral and possibly the mid to distal  segments of the anterior wall are hypokinetic. The right ventricle is not well visualized.  - Cardiology consult appreciated. Likely demand ischemia.    - No acute intervention per cardiology now     PULM:  #Hx COPD  - Restart home Symbicort   - Saturating well on RA    GI/NUTRITION:  #Coffee ground emesis  Likely UGIB.  on admission.  Downtrending, 44 today  - Cont Pantoprazole 40mg BID   - Holding off on EGD per GI   - Diet as tolerated  - Cont Bowel regimen     #Abd pain  Likely 2/2 UGIB. POCUS showed cholelithiasis.  Abdominal ultrasound with biliary sludge and cholelithiasis. No acute shellie.    #GERD  - on pantoprazole 40 IV BID  - takes omeprazole at home    ENDO:  - DKA resolved  - On admelog 7u TID and ISS   - Continue to monitor BMP  - Carb consistent diet    NEPHRO//METABOLIC/VOLUME:  #DUSTIN - improving  Likely prerenal/hemodynamically related 2/2 hypotension in setting of GIB/ anemia / DKA  - Cr 3.25 on admission, 1.36 this AM  - ---> 44   - Hung  - Cont to monitor urine output    #HAGMA - resolved    ID:  #Neutrophilic leukocytosis  Likely 2/2 infection. Procal 3.21 (2/3) , WBC 13.7 (2/3)---> 11.92 today, pt has been afebrile for entirety of MICU stay.  - s/p IVFs  - cont empiric Zosyn ( started on 2/3 for 7 day course)  - MRSA screen positive (2/3) ,was given 1 dose of vanc on 2/3 and was started on mupirocin x5 day course.   - BCx 2/3- neg  - UA 2/3 large leuks, small ketones,  UCx 2/5- neg       HEME/ONC:  #Anemia   Likely 2/2 UGIB.   - Hb 5.6 on admission, received 3U pRBCs on 2/2. Hb has remained stable 8-9 since then. Hb today 8.2.  - Hb goal >=8  - CBC daily   - Maintain active T&S    #Thrombocytopenia  Likely reactive.  -  today    #VTE ppx: SCDs      ETHICS: Full code      FOR FOLLOW UP:  [ ]F/U GI recs  [ ]Continue to trend CBC and signs of bleeding. Transfuse prn to maintain Hgb >8.  [ ]cont to monitor urine output and renal function for resolving DUSTIN

## 2022-02-06 NOTE — PROGRESS NOTE ADULT - SUBJECTIVE AND OBJECTIVE BOX
INTERVAL HPI/OVERNIGHT EVENTS: off levo    SUBJECTIVE: Patient seen and examined at bedside. Intubated, sedated, ROS cannot be obtained.       VITAL SIGNS:  ICU Vital Signs Last 24 Hrs  T(C): 35.6 (05 Feb 2022 04:00), Max: 36.6 (04 Feb 2022 16:00)  T(F): 96.1 (05 Feb 2022 04:00), Max: 97.9 (04 Feb 2022 16:00)  HR: 82 (05 Feb 2022 07:00) (69 - 109)  BP: 105/91 (05 Feb 2022 07:00) (82/67 - 137/74)  BP(mean): 98 (05 Feb 2022 07:00) (60 - 98)  ABP: --  ABP(mean): --  RR: 23 (05 Feb 2022 07:00) (14 - 27)  SpO2: 94% (05 Feb 2022 07:00) (91% - 100%)      Plateau pressure:   P/F ratio:     02-04 @ 07:01  -  02-05 @ 07:00  --------------------------------------------------------  IN: 1985 mL / OUT: 1775 mL / NET: 210 mL      CAPILLARY BLOOD GLUCOSE      POCT Blood Glucose.: 261 mg/dL (05 Feb 2022 08:14)    ECG:    PHYSICAL EXAM:    Gen: NAD, +tired-appearing  HEENT: NCAT, no conjunctival injection, no scleral icterus, +dry oral mucosa  Neck: no JVD, supple, no LAD, no thyromegaly  Resp: +LFNC, normal WOB at rest, CTAB anteriorly  CV: RRR, S1 and S2, no murmurs, no rubs, no gallops, 2+ radial pulses  Abd: nondistended, bowel sounds, soft, nontender, no rebound, no involuntary guarding, no organomegaly  Ext: no lower extremity edema  Neuro: alert, oriented x4  Access: 2 PIV 18g, 1 arrow LUE    MEDICATIONS:  MEDICATIONS  (STANDING):  aspirin enteric coated 81 milliGRAM(s) Oral daily  atorvastatin 40 milliGRAM(s) Oral daily  budesonide  80 MICROgram(s)/formoterol 4.5 MICROgram(s) Inhaler 2 Puff(s) Inhalation two times a day  chlorhexidine 4% Liquid 1 Application(s) Topical <User Schedule>  dextrose 40% Gel 15 Gram(s) Oral once  dextrose 40% Gel 15 Gram(s) Oral once  dextrose 5%. 1000 milliLiter(s) (50 mL/Hr) IV Continuous <Continuous>  dextrose 50% Injectable 25 Gram(s) IV Push once  dextrose 50% Injectable 12.5 Gram(s) IV Push once  dextrose 50% Injectable 25 Gram(s) IV Push once  dextrose 50% Injectable 25 Gram(s) IV Push once  dextrose 50% Injectable 12.5 Gram(s) IV Push once  glucagon  Injectable 1 milliGRAM(s) IntraMuscular once  insulin lispro (ADMELOG) corrective regimen sliding scale   SubCutaneous three times a day before meals  insulin lispro (ADMELOG) corrective regimen sliding scale   SubCutaneous at bedtime  insulin lispro Injectable (ADMELOG) 7 Unit(s) SubCutaneous three times a day before meals  midodrine 10 milliGRAM(s) Oral every 8 hours  norepinephrine Infusion 0.05 MICROgram(s)/kG/Min (8.68 mL/Hr) IV Continuous <Continuous>  pantoprazole  Injectable 40 milliGRAM(s) IV Push two times a day  piperacillin/tazobactam IVPB.. 3.375 Gram(s) IV Intermittent every 12 hours  polyethylene glycol 3350 17 Gram(s) Oral daily  senna 2 Tablet(s) Oral at bedtime    MEDICATIONS  (PRN):      ALLERGIES:  Allergies    No Known Allergies    Intolerances        LABS:                        8.1    12.57 )-----------( 117      ( 05 Feb 2022 01:54 )             22.8     02-05    138  |  107  |  60<H>  ----------------------------<  155<H>  4.3   |  19<L>  |  1.42<H>    Ca    8.2<L>      05 Feb 2022 01:54  Phos  2.3     02-05  Mg     1.80     02-05    TPro  4.8<L>  /  Alb  2.3<L>  /  TBili  1.2  /  DBili  x   /  AST  20  /  ALT  7   /  AlkPhos  56  02-05    PT/INR - ( 04 Feb 2022 02:25 )   PT: 12.5 sec;   INR: 1.09 ratio         PTT - ( 04 Feb 2022 02:25 )  PTT:26.2 sec      RADIOLOGY & ADDITIONAL TESTS: Reviewed.   INTERVAL HPI/OVERNIGHT EVENTS: pt is off levo, Hb stable    SUBJECTIVE: Patient seen and examined at bedside. Intubated, sedated, ROS cannot be obtained.       VITAL SIGNS:  ICU Vital Signs Last 24 Hrs  T(C): 35.6 (05 Feb 2022 04:00), Max: 36.6 (04 Feb 2022 16:00)  T(F): 96.1 (05 Feb 2022 04:00), Max: 97.9 (04 Feb 2022 16:00)  HR: 82 (05 Feb 2022 07:00) (69 - 109)  BP: 105/91 (05 Feb 2022 07:00) (82/67 - 137/74)  BP(mean): 98 (05 Feb 2022 07:00) (60 - 98)  ABP: --  ABP(mean): --  RR: 23 (05 Feb 2022 07:00) (14 - 27)  SpO2: 94% (05 Feb 2022 07:00) (91% - 100%)      Plateau pressure:   P/F ratio:     02-04 @ 07:01  -  02-05 @ 07:00  --------------------------------------------------------  IN: 1985 mL / OUT: 1775 mL / NET: 210 mL      CAPILLARY BLOOD GLUCOSE      POCT Blood Glucose.: 261 mg/dL (05 Feb 2022 08:14)    ECG:    PHYSICAL EXAM:    Gen: NAD, +tired-appearing  HEENT: NCAT, no conjunctival injection, no scleral icterus, +dry oral mucosa  Neck: no JVD, supple, no LAD, no thyromegaly  Resp: +LFNC, normal WOB at rest, CTAB anteriorly  CV: RRR, S1 and S2, no murmurs, no rubs, no gallops, 2+ radial pulses  Abd: nondistended, bowel sounds, soft, nontender, no rebound, no involuntary guarding, no organomegaly  Ext: no lower extremity edema  Neuro: alert, oriented x4  Access: 2 PIV 18g, 1 arrow LUE    MEDICATIONS:  MEDICATIONS  (STANDING):  aspirin enteric coated 81 milliGRAM(s) Oral daily  atorvastatin 40 milliGRAM(s) Oral daily  budesonide  80 MICROgram(s)/formoterol 4.5 MICROgram(s) Inhaler 2 Puff(s) Inhalation two times a day  chlorhexidine 4% Liquid 1 Application(s) Topical <User Schedule>  dextrose 40% Gel 15 Gram(s) Oral once  dextrose 40% Gel 15 Gram(s) Oral once  dextrose 5%. 1000 milliLiter(s) (50 mL/Hr) IV Continuous <Continuous>  dextrose 50% Injectable 25 Gram(s) IV Push once  dextrose 50% Injectable 12.5 Gram(s) IV Push once  dextrose 50% Injectable 25 Gram(s) IV Push once  dextrose 50% Injectable 25 Gram(s) IV Push once  dextrose 50% Injectable 12.5 Gram(s) IV Push once  glucagon  Injectable 1 milliGRAM(s) IntraMuscular once  insulin lispro (ADMELOG) corrective regimen sliding scale   SubCutaneous three times a day before meals  insulin lispro (ADMELOG) corrective regimen sliding scale   SubCutaneous at bedtime  insulin lispro Injectable (ADMELOG) 7 Unit(s) SubCutaneous three times a day before meals  midodrine 10 milliGRAM(s) Oral every 8 hours  norepinephrine Infusion 0.05 MICROgram(s)/kG/Min (8.68 mL/Hr) IV Continuous <Continuous>  pantoprazole  Injectable 40 milliGRAM(s) IV Push two times a day  piperacillin/tazobactam IVPB.. 3.375 Gram(s) IV Intermittent every 12 hours  polyethylene glycol 3350 17 Gram(s) Oral daily  senna 2 Tablet(s) Oral at bedtime    MEDICATIONS  (PRN):      ALLERGIES:  Allergies    No Known Allergies    Intolerances        LABS:                        8.1    12.57 )-----------( 117      ( 05 Feb 2022 01:54 )             22.8     02-05    138  |  107  |  60<H>  ----------------------------<  155<H>  4.3   |  19<L>  |  1.42<H>    Ca    8.2<L>      05 Feb 2022 01:54  Phos  2.3     02-05  Mg     1.80     02-05    TPro  4.8<L>  /  Alb  2.3<L>  /  TBili  1.2  /  DBili  x   /  AST  20  /  ALT  7   /  AlkPhos  56  02-05    PT/INR - ( 04 Feb 2022 02:25 )   PT: 12.5 sec;   INR: 1.09 ratio         PTT - ( 04 Feb 2022 02:25 )  PTT:26.2 sec      RADIOLOGY & ADDITIONAL TESTS: Reviewed.

## 2022-02-06 NOTE — PATIENT PROFILE ADULT - FALL HARM RISK - RISK INTERVENTIONS
Assistance OOB with selected safe patient handling equipment/Assistance with ambulation/Communicate Fall Risk and Risk Factors to all staff, patient, and family/Discuss with provider need for PT consult/Monitor gait and stability/Reinforce activity limits and safety measures with patient and family/Visual Cue: Yellow wristband/Bed in lowest position, wheels locked, appropriate side rails in place/Call bell, personal items and telephone in reach/Instruct patient to call for assistance before getting out of bed or chair/Non-slip footwear when patient is out of bed/[place] to call system/Physically safe environment - no spills, clutter or unnecessary equipment/Purposeful Proactive Rounding/Room/bathroom lighting operational, light cord in reach

## 2022-02-07 LAB
ACETONE, GCMS SCREEN URN: 18 MG/DL — SIGNIFICANT CHANGE UP
ALBUMIN SERPL ELPH-MCNC: 2.2 G/DL — LOW (ref 3.3–5)
ALP SERPL-CCNC: 63 U/L — SIGNIFICANT CHANGE UP (ref 40–120)
ALT FLD-CCNC: 6 U/L — SIGNIFICANT CHANGE UP (ref 4–41)
ANION GAP SERPL CALC-SCNC: 16 MMOL/L — HIGH (ref 7–14)
AST SERPL-CCNC: 18 U/L — SIGNIFICANT CHANGE UP (ref 4–40)
BASOPHILS # BLD AUTO: 0.01 K/UL — SIGNIFICANT CHANGE UP (ref 0–0.2)
BASOPHILS NFR BLD AUTO: 0.1 % — SIGNIFICANT CHANGE UP (ref 0–2)
BILIRUB SERPL-MCNC: 1.5 MG/DL — HIGH (ref 0.2–1.2)
BUN SERPL-MCNC: 40 MG/DL — HIGH (ref 7–23)
CALCIUM SERPL-MCNC: 8.2 MG/DL — LOW (ref 8.4–10.5)
CHLORIDE SERPL-SCNC: 105 MMOL/L — SIGNIFICANT CHANGE UP (ref 98–107)
CO2 SERPL-SCNC: 17 MMOL/L — LOW (ref 22–31)
CREAT SERPL-MCNC: 1.46 MG/DL — HIGH (ref 0.5–1.3)
EOSINOPHIL # BLD AUTO: 0.06 K/UL — SIGNIFICANT CHANGE UP (ref 0–0.5)
EOSINOPHIL NFR BLD AUTO: 0.5 % — SIGNIFICANT CHANGE UP (ref 0–6)
GLUCOSE BLDC GLUCOMTR-MCNC: 102 MG/DL — HIGH (ref 70–99)
GLUCOSE BLDC GLUCOMTR-MCNC: 109 MG/DL — HIGH (ref 70–99)
GLUCOSE BLDC GLUCOMTR-MCNC: 110 MG/DL — HIGH (ref 70–99)
GLUCOSE BLDC GLUCOMTR-MCNC: 114 MG/DL — HIGH (ref 70–99)
GLUCOSE BLDC GLUCOMTR-MCNC: 118 MG/DL — HIGH (ref 70–99)
GLUCOSE BLDC GLUCOMTR-MCNC: 62 MG/DL — LOW (ref 70–99)
GLUCOSE BLDC GLUCOMTR-MCNC: 64 MG/DL — LOW (ref 70–99)
GLUCOSE BLDC GLUCOMTR-MCNC: 82 MG/DL — SIGNIFICANT CHANGE UP (ref 70–99)
GLUCOSE BLDC GLUCOMTR-MCNC: 84 MG/DL — SIGNIFICANT CHANGE UP (ref 70–99)
GLUCOSE BLDC GLUCOMTR-MCNC: 94 MG/DL — SIGNIFICANT CHANGE UP (ref 70–99)
GLUCOSE SERPL-MCNC: 85 MG/DL — SIGNIFICANT CHANGE UP (ref 70–99)
HCT VFR BLD CALC: 24 % — LOW (ref 39–50)
HCT VFR BLD CALC: 25.4 % — LOW (ref 39–50)
HGB BLD-MCNC: 8 G/DL — LOW (ref 13–17)
HGB BLD-MCNC: 8.8 G/DL — LOW (ref 13–17)
IANC: 9.45 K/UL — HIGH (ref 1.5–8.5)
IMM GRANULOCYTES NFR BLD AUTO: 0.8 % — SIGNIFICANT CHANGE UP (ref 0–1.5)
ISOPROPANOL GCMS: SIGNIFICANT CHANGE UP MG/DL
LYMPHOCYTES # BLD AUTO: 1.45 K/UL — SIGNIFICANT CHANGE UP (ref 1–3.3)
LYMPHOCYTES # BLD AUTO: 11.7 % — LOW (ref 13–44)
MAGNESIUM SERPL-MCNC: 1.6 MG/DL — SIGNIFICANT CHANGE UP (ref 1.6–2.6)
MCHC RBC-ENTMCNC: 29.9 PG — SIGNIFICANT CHANGE UP (ref 27–34)
MCHC RBC-ENTMCNC: 30.1 PG — SIGNIFICANT CHANGE UP (ref 27–34)
MCHC RBC-ENTMCNC: 33.3 GM/DL — SIGNIFICANT CHANGE UP (ref 32–36)
MCHC RBC-ENTMCNC: 34.6 GM/DL — SIGNIFICANT CHANGE UP (ref 32–36)
MCV RBC AUTO: 87 FL — SIGNIFICANT CHANGE UP (ref 80–100)
MCV RBC AUTO: 89.6 FL — SIGNIFICANT CHANGE UP (ref 80–100)
MONOCYTES # BLD AUTO: 1.28 K/UL — HIGH (ref 0–0.9)
MONOCYTES NFR BLD AUTO: 10.4 % — SIGNIFICANT CHANGE UP (ref 2–14)
NEUTROPHILS # BLD AUTO: 9.45 K/UL — HIGH (ref 1.8–7.4)
NEUTROPHILS NFR BLD AUTO: 76.5 % — SIGNIFICANT CHANGE UP (ref 43–77)
NRBC # BLD: 0 /100 WBCS — SIGNIFICANT CHANGE UP
NRBC # BLD: 0 /100 WBCS — SIGNIFICANT CHANGE UP
NRBC # FLD: 0 K/UL — SIGNIFICANT CHANGE UP
NRBC # FLD: 0.02 K/UL — HIGH
PHOSPHATE SERPL-MCNC: 3.4 MG/DL — SIGNIFICANT CHANGE UP (ref 2.5–4.5)
PLATELET # BLD AUTO: 147 K/UL — LOW (ref 150–400)
PLATELET # BLD AUTO: 150 K/UL — SIGNIFICANT CHANGE UP (ref 150–400)
POTASSIUM SERPL-MCNC: 3.9 MMOL/L — SIGNIFICANT CHANGE UP (ref 3.5–5.3)
POTASSIUM SERPL-SCNC: 3.9 MMOL/L — SIGNIFICANT CHANGE UP (ref 3.5–5.3)
PROT SERPL-MCNC: 5 G/DL — LOW (ref 6–8.3)
RBC # BLD: 2.68 M/UL — LOW (ref 4.2–5.8)
RBC # BLD: 2.92 M/UL — LOW (ref 4.2–5.8)
RBC # FLD: 15.8 % — HIGH (ref 10.3–14.5)
RBC # FLD: 16.4 % — HIGH (ref 10.3–14.5)
SODIUM SERPL-SCNC: 138 MMOL/L — SIGNIFICANT CHANGE UP (ref 135–145)
WBC # BLD: 12.35 K/UL — HIGH (ref 3.8–10.5)
WBC # BLD: 12.64 K/UL — HIGH (ref 3.8–10.5)
WBC # FLD AUTO: 12.35 K/UL — HIGH (ref 3.8–10.5)
WBC # FLD AUTO: 12.64 K/UL — HIGH (ref 3.8–10.5)

## 2022-02-07 PROCEDURE — 99233 SBSQ HOSP IP/OBS HIGH 50: CPT | Mod: GC

## 2022-02-07 PROCEDURE — 93010 ELECTROCARDIOGRAM REPORT: CPT

## 2022-02-07 PROCEDURE — 99232 SBSQ HOSP IP/OBS MODERATE 35: CPT | Mod: GC

## 2022-02-07 RX ORDER — POTASSIUM CHLORIDE 20 MEQ
20 PACKET (EA) ORAL ONCE
Refills: 0 | Status: COMPLETED | OUTPATIENT
Start: 2022-02-07 | End: 2022-02-07

## 2022-02-07 RX ORDER — ASPIRIN/CALCIUM CARB/MAGNESIUM 324 MG
81 TABLET ORAL DAILY
Refills: 0 | Status: DISCONTINUED | OUTPATIENT
Start: 2022-02-07 | End: 2022-02-18

## 2022-02-07 RX ORDER — INSULIN GLARGINE 100 [IU]/ML
20 INJECTION, SOLUTION SUBCUTANEOUS AT BEDTIME
Refills: 0 | Status: DISCONTINUED | OUTPATIENT
Start: 2022-02-07 | End: 2022-02-09

## 2022-02-07 RX ORDER — CITALOPRAM 10 MG/1
10 TABLET, FILM COATED ORAL DAILY
Refills: 0 | Status: DISCONTINUED | OUTPATIENT
Start: 2022-02-07 | End: 2022-02-18

## 2022-02-07 RX ORDER — PANTOPRAZOLE SODIUM 20 MG/1
40 TABLET, DELAYED RELEASE ORAL
Refills: 0 | Status: DISCONTINUED | OUTPATIENT
Start: 2022-02-07 | End: 2022-02-18

## 2022-02-07 RX ORDER — HEPARIN SODIUM 5000 [USP'U]/ML
5000 INJECTION INTRAVENOUS; SUBCUTANEOUS EVERY 8 HOURS
Refills: 0 | Status: DISCONTINUED | OUTPATIENT
Start: 2022-02-07 | End: 2022-02-09

## 2022-02-07 RX ORDER — MAGNESIUM SULFATE 500 MG/ML
2 VIAL (ML) INJECTION ONCE
Refills: 0 | Status: COMPLETED | OUTPATIENT
Start: 2022-02-07 | End: 2022-02-07

## 2022-02-07 RX ORDER — SODIUM CHLORIDE 9 MG/ML
1000 INJECTION, SOLUTION INTRAVENOUS
Refills: 0 | Status: DISCONTINUED | OUTPATIENT
Start: 2022-02-07 | End: 2022-02-07

## 2022-02-07 RX ORDER — POTASSIUM CHLORIDE 20 MEQ
10 PACKET (EA) ORAL ONCE
Refills: 0 | Status: DISCONTINUED | OUTPATIENT
Start: 2022-02-07 | End: 2022-02-07

## 2022-02-07 RX ADMIN — PANTOPRAZOLE SODIUM 40 MILLIGRAM(S): 20 TABLET, DELAYED RELEASE ORAL at 05:26

## 2022-02-07 RX ADMIN — POLYETHYLENE GLYCOL 3350 17 GRAM(S): 17 POWDER, FOR SOLUTION ORAL at 13:21

## 2022-02-07 RX ADMIN — SCOPALAMINE 1 PATCH: 1 PATCH, EXTENDED RELEASE TRANSDERMAL at 07:30

## 2022-02-07 RX ADMIN — Medication 81 MILLIGRAM(S): at 15:17

## 2022-02-07 RX ADMIN — CHLORHEXIDINE GLUCONATE 1 APPLICATION(S): 213 SOLUTION TOPICAL at 13:18

## 2022-02-07 RX ADMIN — BUDESONIDE AND FORMOTEROL FUMARATE DIHYDRATE 2 PUFF(S): 160; 4.5 AEROSOL RESPIRATORY (INHALATION) at 07:54

## 2022-02-07 RX ADMIN — MIDODRINE HYDROCHLORIDE 20 MILLIGRAM(S): 2.5 TABLET ORAL at 05:26

## 2022-02-07 RX ADMIN — BUDESONIDE AND FORMOTEROL FUMARATE DIHYDRATE 2 PUFF(S): 160; 4.5 AEROSOL RESPIRATORY (INHALATION) at 21:22

## 2022-02-07 RX ADMIN — PIPERACILLIN AND TAZOBACTAM 25 GRAM(S): 4; .5 INJECTION, POWDER, LYOPHILIZED, FOR SOLUTION INTRAVENOUS at 15:17

## 2022-02-07 RX ADMIN — Medication 25 GRAM(S): at 05:26

## 2022-02-07 RX ADMIN — ATORVASTATIN CALCIUM 40 MILLIGRAM(S): 80 TABLET, FILM COATED ORAL at 13:20

## 2022-02-07 RX ADMIN — MUPIROCIN 1 APPLICATION(S): 20 OINTMENT TOPICAL at 17:19

## 2022-02-07 RX ADMIN — MUPIROCIN 1 APPLICATION(S): 20 OINTMENT TOPICAL at 05:25

## 2022-02-07 RX ADMIN — Medication 20 MILLIEQUIVALENT(S): at 13:21

## 2022-02-07 RX ADMIN — MIDODRINE HYDROCHLORIDE 20 MILLIGRAM(S): 2.5 TABLET ORAL at 17:19

## 2022-02-07 RX ADMIN — INSULIN GLARGINE 20 UNIT(S): 100 INJECTION, SOLUTION SUBCUTANEOUS at 21:55

## 2022-02-07 RX ADMIN — CITALOPRAM 10 MILLIGRAM(S): 10 TABLET, FILM COATED ORAL at 15:17

## 2022-02-07 RX ADMIN — HEPARIN SODIUM 5000 UNIT(S): 5000 INJECTION INTRAVENOUS; SUBCUTANEOUS at 15:17

## 2022-02-07 RX ADMIN — PIPERACILLIN AND TAZOBACTAM 25 GRAM(S): 4; .5 INJECTION, POWDER, LYOPHILIZED, FOR SOLUTION INTRAVENOUS at 03:32

## 2022-02-07 RX ADMIN — Medication 7 UNIT(S): at 08:50

## 2022-02-07 RX ADMIN — PANTOPRAZOLE SODIUM 40 MILLIGRAM(S): 20 TABLET, DELAYED RELEASE ORAL at 17:20

## 2022-02-07 RX ADMIN — SCOPALAMINE 1 PATCH: 1 PATCH, EXTENDED RELEASE TRANSDERMAL at 18:58

## 2022-02-07 NOTE — PROGRESS NOTE ADULT - SUBJECTIVE AND OBJECTIVE BOX
Significant recent/past 24 hr events:    Subjective:    Review of Systems         [ ] A ten-point review of systems was otherwise negative except as noted.  [ ] Due to altered mental status/intubation, subjective information were not able to be obtained from the patient. History was obtained, to the extent possible, from review of the chart and collateral sources of information.      Patient is a 76y old  Male who presents with a chief complaint of Abdominal pain (06 Feb 2022 07:48)    HPI:  76M PMHx CHF (TTE 8/2011 demonstrating moderate segmental LV systolic dysfunction), CAD, COPD, HTN, T2DM presents from [facility] with abdominal pain and coffee ground emesis. Patient states that he started experiencing acute onset of R lower quadrant abdominal pain x 1 week, described as a 7/10 intermittent, sharp, non-radiating pain that is made better with consumption of liquid diet. He also endorses 1-2 episodes of pale green and clear emesis daily within this 1-week time frame. Note that per documentation accompanying patient from NH, patient was being transferred for evaluation of GI bleed and hypotension.    In the ED:  VS: BP /47-66, HR 89-91, RR 17-26, O2 Sat  on 2L, Tmax 97.9  Labs: CBC - WBC 13.72, Hgb 5.6 (baseline is 17.3 in 2015), platelets 123. CMP - Na 128, /3.25 (baseline 1.69 9/2011); AST/ALT 58/10, otherwise normal. VBG 7.37/29/32/17/lactate 4.8.    Patient given: Pantoprazole 80, pRBC 2U, ordered for pantoprazole drip and given one additional unit of pRBC. Patient was noted to be intermittently hypotensive despite fluid resuscitation, was admitted to MICU. (02 Feb 2022 21:58)    PAST MEDICAL & SURGICAL HISTORY:  Cigarette Smoker    Essential Hypertension    CHF (Congestive Heart Failure)    Diabetes Mellitus    CAD (Coronary Atherosclerotic Disease)    Hypercholesteremia    CABG (Coronary Artery Bypass Graft)  Dec 2010    History of Appendectomy    CABG (Coronary Artery Bypass Graft)      FAMILY HISTORY:      Vitals   ICU Vital Signs Last 24 Hrs  T(C): 36 (07 Feb 2022 08:00), Max: 36.6 (07 Feb 2022 04:00)  T(F): 96.8 (07 Feb 2022 08:00), Max: 97.8 (07 Feb 2022 04:00)  HR: 83 (07 Feb 2022 08:00) (73 - 98)  BP: 107/60 (07 Feb 2022 08:00) (85/67 - 143/99)  BP(mean): 70 (07 Feb 2022 08:00) (55 - 111)  ABP: --  ABP(mean): --  RR: 28 (07 Feb 2022 08:00) (16 - 28)  SpO2: 100% (07 Feb 2022 08:00) (96% - 100%)      Physical Exam:   Constitutional: NAD, well-groomed, well-developed  HEENT: PERRLA, EOMI, no drainage or redness  Neck: supple,  No JVD, Trachea midline  Back: Normal spine flexure, No CVA tenderness, No deformity or limitation of movement  Respiratory: Breath Sounds equal & clear bilaterally to auscultation, no accessory muscle use noted  Cardiovascular: Regular rate, regular rhythm, normal S1, S2; no murmurs or rub  Gastrointestinal: Soft, non-tender, non distended, no hepatosplenomegaly, normal bowel sounds  Extremities: KOHLER x 4, no peripheral edema, no cyanosis, no clubbing   Vascular: Equal and normal pulses: 2+ peripheral pulses throughout  Neurological: A+O x 3; speech clear and intact; no sensory, motor  deficits, normal reflexes  Psychiatric: calm, normal mood, normal affect  Musculoskeletal: No joint swelling or deformity; no limitation of movement  Skin: warm, dry, well perfused, no rashes    VENT SETTINGS         I&O's Detail    06 Feb 2022 07:01  -  07 Feb 2022 07:00  --------------------------------------------------------  IN:    IV PiggyBack: 250 mL  Total IN: 250 mL    OUT:    Voided (mL): 1440 mL  Total OUT: 1440 mL    Total NET: -1190 mL          LABS                        8.8    12.35 )-----------( 150      ( 07 Feb 2022 03:38 )             25.4     02-07    138  |  105  |  40<H>  ----------------------------<  85  3.9   |  17<L>  |  1.46<H>    Ca    8.2<L>      07 Feb 2022 03:38  Phos  3.4     02-07  Mg     1.60     02-07    TPro  5.0<L>  /  Alb  2.2<L>  /  TBili  1.5<H>  /  DBili  x   /  AST  18  /  ALT  6   /  AlkPhos  63  02-07    LIVER FUNCTIONS - ( 07 Feb 2022 03:38 )  Alb: 2.2 g/dL / Pro: 5.0 g/dL / ALK PHOS: 63 U/L / ALT: 6 U/L / AST: 18 U/L / GGT: x                     POCT Blood Glucose.: 109 mg/dL *H* (02-07-22 @ 08:42)  POCT Blood Glucose.: 84 mg/dL (02-06-22 @ 21:10)  POCT Blood Glucose.: 79 mg/dL (02-06-22 @ 16:25)  POCT Blood Glucose.: 157 mg/dL *H* (02-06-22 @ 11:29)        MEDICATIONS  (STANDING):  atorvastatin 40 milliGRAM(s) Oral daily  budesonide  80 MICROgram(s)/formoterol 4.5 MICROgram(s) Inhaler 2 Puff(s) Inhalation two times a day  chlorhexidine 4% Liquid 1 Application(s) Topical <User Schedule>  dextrose 40% Gel 15 Gram(s) Oral once  dextrose 40% Gel 15 Gram(s) Oral once  dextrose 5%. 1000 milliLiter(s) (50 mL/Hr) IV Continuous <Continuous>  dextrose 50% Injectable 25 Gram(s) IV Push once  dextrose 50% Injectable 12.5 Gram(s) IV Push once  dextrose 50% Injectable 25 Gram(s) IV Push once  dextrose 50% Injectable 25 Gram(s) IV Push once  dextrose 50% Injectable 12.5 Gram(s) IV Push once  glucagon  Injectable 1 milliGRAM(s) IntraMuscular once  insulin glargine Injectable (LANTUS) 20 Unit(s) SubCutaneous at bedtime  insulin lispro (ADMELOG) corrective regimen sliding scale   SubCutaneous three times a day before meals  insulin lispro (ADMELOG) corrective regimen sliding scale   SubCutaneous at bedtime  insulin lispro Injectable (ADMELOG) 7 Unit(s) SubCutaneous three times a day before meals  midodrine 20 milliGRAM(s) Oral every 8 hours  mupirocin 2% Ointment 1 Application(s) Topical two times a day  pantoprazole  Injectable 40 milliGRAM(s) IV Push two times a day  piperacillin/tazobactam IVPB.. 3.375 Gram(s) IV Intermittent every 12 hours  polyethylene glycol 3350 17 Gram(s) Oral daily  potassium chloride   Solution 10 milliEquivalent(s) Oral once  senna 2 Tablet(s) Oral at bedtime    MEDICATIONS  (PRN):      Allergies:  No Known Allergies               Significant recent/past 24 hr events: confusion overnight, resolved; hypoglycemic, FS- 62, improved     Subjective: pt denies any complaints     Review of Systems         [ ] A ten-point review of systems was otherwise negative except as noted.  [ ] Due to altered mental status/intubation, subjective information were not able to be obtained from the patient. History was obtained, to the extent possible, from review of the chart and collateral sources of information.      Patient is a 76y old  Male who presents with a chief complaint of Abdominal pain (06 Feb 2022 07:48)    HPI:  76M PMHx CHF (TTE 8/2011 demonstrating moderate segmental LV systolic dysfunction), CAD, COPD, HTN, T2DM presents from [facility] with abdominal pain and coffee ground emesis. Patient states that he started experiencing acute onset of R lower quadrant abdominal pain x 1 week, described as a 7/10 intermittent, sharp, non-radiating pain that is made better with consumption of liquid diet. He also endorses 1-2 episodes of pale green and clear emesis daily within this 1-week time frame. Note that per documentation accompanying patient from NH, patient was being transferred for evaluation of GI bleed and hypotension.    In the ED:  VS: BP /47-66, HR 89-91, RR 17-26, O2 Sat  on 2L, Tmax 97.9  Labs: CBC - WBC 13.72, Hgb 5.6 (baseline is 17.3 in 2015), platelets 123. CMP - Na 128, /3.25 (baseline 1.69 9/2011); AST/ALT 58/10, otherwise normal. VBG 7.37/29/32/17/lactate 4.8.    Patient given: Pantoprazole 80, pRBC 2U, ordered for pantoprazole drip and given one additional unit of pRBC. Patient was noted to be intermittently hypotensive despite fluid resuscitation, was admitted to MICU. (02 Feb 2022 21:58)    PAST MEDICAL & SURGICAL HISTORY:  Cigarette Smoker    Essential Hypertension    CHF (Congestive Heart Failure)    Diabetes Mellitus    CAD (Coronary Atherosclerotic Disease)    Hypercholesteremia    CABG (Coronary Artery Bypass Graft)  Dec 2010    History of Appendectomy    CABG (Coronary Artery Bypass Graft)      FAMILY HISTORY:      Vitals   ICU Vital Signs Last 24 Hrs  T(C): 36 (07 Feb 2022 08:00), Max: 36.6 (07 Feb 2022 04:00)  T(F): 96.8 (07 Feb 2022 08:00), Max: 97.8 (07 Feb 2022 04:00)  HR: 83 (07 Feb 2022 08:00) (73 - 98)  BP: 107/60 (07 Feb 2022 08:00) (85/67 - 143/99)  BP(mean): 70 (07 Feb 2022 08:00) (55 - 111)  ABP: --  ABP(mean): --  RR: 28 (07 Feb 2022 08:00) (16 - 28)  SpO2: 100% (07 Feb 2022 08:00) (96% - 100%)      Physical Exam:   Constitutional: NAD, well-groomed, well-developed  HEENT: PERRLA, EOMI, no drainage or redness  Neck: supple,  No JVD, Trachea midline  Back: Normal spine flexure, No CVA tenderness, No deformity or limitation of movement  Respiratory: Breath Sounds equal & clear bilaterally to auscultation, no accessory muscle use noted  Cardiovascular: Regular rate, regular rhythm, normal S1, S2; no murmurs or rub  Gastrointestinal: Soft, non-tender, non distended, no hepatosplenomegaly, normal bowel sounds  Extremities: KOHLER x 4, no peripheral edema, no cyanosis, no clubbing   Vascular: Equal and normal pulses: 2+ peripheral pulses throughout  Neurological: A+O x 2- 3; speech-- slurry; + L hemiparesis, normal reflexes  Psychiatric: calm, normal mood, normal affect  Musculoskeletal: No joint swelling or deformity; no limitation of movement  Skin: warm, dry, well perfused, no rashes    VENT SETTINGS         I&O's Detail    06 Feb 2022 07:01  -  07 Feb 2022 07:00  --------------------------------------------------------  IN:    IV PiggyBack: 250 mL  Total IN: 250 mL    OUT:    Voided (mL): 1440 mL  Total OUT: 1440 mL    Total NET: -1190 mL          LABS                        8.8    12.35 )-----------( 150      ( 07 Feb 2022 03:38 )             25.4     02-07    138  |  105  |  40<H>  ----------------------------<  85  3.9   |  17<L>  |  1.46<H>    Ca    8.2<L>      07 Feb 2022 03:38  Phos  3.4     02-07  Mg     1.60     02-07    TPro  5.0<L>  /  Alb  2.2<L>  /  TBili  1.5<H>  /  DBili  x   /  AST  18  /  ALT  6   /  AlkPhos  63  02-07    LIVER FUNCTIONS - ( 07 Feb 2022 03:38 )  Alb: 2.2 g/dL / Pro: 5.0 g/dL / ALK PHOS: 63 U/L / ALT: 6 U/L / AST: 18 U/L / GGT: x                     POCT Blood Glucose.: 109 mg/dL *H* (02-07-22 @ 08:42)  POCT Blood Glucose.: 84 mg/dL (02-06-22 @ 21:10)  POCT Blood Glucose.: 79 mg/dL (02-06-22 @ 16:25)  POCT Blood Glucose.: 157 mg/dL *H* (02-06-22 @ 11:29)        MEDICATIONS  (STANDING):  atorvastatin 40 milliGRAM(s) Oral daily  budesonide  80 MICROgram(s)/formoterol 4.5 MICROgram(s) Inhaler 2 Puff(s) Inhalation two times a day  chlorhexidine 4% Liquid 1 Application(s) Topical <User Schedule>  dextrose 40% Gel 15 Gram(s) Oral once  dextrose 40% Gel 15 Gram(s) Oral once  dextrose 5%. 1000 milliLiter(s) (50 mL/Hr) IV Continuous <Continuous>  dextrose 50% Injectable 25 Gram(s) IV Push once  dextrose 50% Injectable 12.5 Gram(s) IV Push once  dextrose 50% Injectable 25 Gram(s) IV Push once  dextrose 50% Injectable 25 Gram(s) IV Push once  dextrose 50% Injectable 12.5 Gram(s) IV Push once  glucagon  Injectable 1 milliGRAM(s) IntraMuscular once  insulin glargine Injectable (LANTUS) 20 Unit(s) SubCutaneous at bedtime  insulin lispro (ADMELOG) corrective regimen sliding scale   SubCutaneous three times a day before meals  insulin lispro (ADMELOG) corrective regimen sliding scale   SubCutaneous at bedtime  insulin lispro Injectable (ADMELOG) 7 Unit(s) SubCutaneous three times a day before meals  midodrine 20 milliGRAM(s) Oral every 8 hours  mupirocin 2% Ointment 1 Application(s) Topical two times a day  pantoprazole  Injectable 40 milliGRAM(s) IV Push two times a day  piperacillin/tazobactam IVPB.. 3.375 Gram(s) IV Intermittent every 12 hours  polyethylene glycol 3350 17 Gram(s) Oral daily  potassium chloride   Solution 10 milliEquivalent(s) Oral once  senna 2 Tablet(s) Oral at bedtime    MEDICATIONS  (PRN):      Allergies:  No Known Allergies

## 2022-02-07 NOTE — CHART NOTE - NSCHARTNOTEFT_GEN_A_CORE
76M with PMHx HFrEF (TTE 8/2011 showed moderate segmental LVSD), CAD s/p CABG, HTN, DM. Presented from [facility] with abd pain and coffee ground emesis. Found to be hypotensive requiring levophed, possibly in the setting of infection or hypovolemia due to UGIB. Anemic to Hb 5.4, with DUSTIN, and with c/f UGIB, s/p 3u PRBC on 02/02. H/H are stable, H/H on 02/07 -- 8.8/25.4.   Course complicated by worsening high AG acidosis and DKA, s/p insulin gtt. Pt now off pressors and hemodynamically stable for transfer to medical floors.    NEURO/PSYCH:  - restarted home citalopram  - AOx2-3, calm and pleasant     CV:  #Shock  UGIB vs infection   - Off levophed   - s/p IVF resuscitation.  - s/p empiric zosyn and vanc by level  - Holding home carvedilol, ASA _81 mg daily, -- was restarted 2/7, as per discussion with GI   - Continue midodrine to 20 TID, monitor for bradycardia      #HFrEF, CAD, troponinemia (1320--> 1192)  Potential demand ischemia. Last documented LVEF in [employer] was 27%  - TTE 2/3:  unable to assess EF/LV function.  moderate mitral stenosis. Calcified trileaflet aortic valve with mildly decreased, Grossly the anterolateral and possibly the mid to distal segments of the anterior wall are hypokinetic. The right ventricle is not well visualized.  - Cardiology consult appreciated. Likely demand ischemia.    - No acute intervention per cardiology now     PULM:  #Hx COPD  - Restarted home Symbicort   - Saturating well on RA- 98 %     GI/NUTRITION:  #Coffee ground emesis  Likely UGIB.  on admission.  Downtrending, 44 today  - Cont Pantoprazole 40mg BID   - Holding off on EGD per GI, as per has no active bleeding and is hemodynamically stable,   - Diet as tolerated__ discussed with GI -- 02/07__ advanced to mechanical soft on 02/07   - Cont Bowel regimen     #Abd pain  Likely 2/2 UGIB. POCUS showed cholelithiasis.  Abdominal ultrasound with biliary sludge and cholelithiasis. No acute shellie.    #GERD  - on pantoprazole 40 IV BID__ changed it to 40 PO BID   - takes omeprazole at home    ENDO:  - DKA resolved, restarted Lantus 20 U qhs__ FS-- 84>109,   - On admelog 7u TID and ISS   - Continue to monitor BMP  - Carb consistent diet    NEPHRO//METABOLIC/VOLUME:  #DUSTIN - improving  Likely prerenal/hemodynamically related 2/2 hypotension in setting of GIB/ anemia / DKA  - Cr 3.25 on admission, 1.46 -- 02/07  - ---> 40   - Hall discontinued   - Cont to monitor urine output    #HAGMA - resolved    ID:  #Neutrophilic leukocytosis  Likely 2/2 infection. Procal 3.21 (2/3) , WBC 13.7 (2/3)---> 12.35 today, pt has been afebrile for entirety of MICU stay. T max- 96.8  - s/p IVFs  - cont empiric Zosyn ( started on 2/3 for 5 day course), last day 02/08  - MRSA screen positive (2/3) ,was given 1 dose of vanc on 2/3 and was started on mupirocin x5 day course.   - BCx 2/3- NGTD, to be finalize  - UA 2/3 large leuks, small ketones,  UCx 2/5- neg       HEME/ONC:  #Anemia   Likely 2/2 UGIB.   - Hb 5.6 on admission, received 3U pRBCs on 2/2. Hb has remained stable 8-9 since then. Hb today 8.8  - Hb goal >=8  - CBC daily   -- Heparin SC was restarted, as per discussion with GI,   - Maintain active T&S    #Thrombocytopenia  Likely reactive.  -  today    #VTE ppx: SCDs      ETHICS: Full code    Dispo: transfer to medicine floor     FOR FOLLOW UP:  [ ]F/U GI recs  [ ]Continue to trend CBC and signs of bleeding. Transfuse prn to maintain Hgb >8.  [ ]cont to monitor urine output and renal function for resolving DUSTIN. Indwelling hall catheter was discontinued on 02/07 and TOV  [] Zosyn until 02/08, and then discontinue   [] F/U PT consult recs 76M with PMHx HFrEF (TTE 8/2011 showed moderate segmental LVSD), CAD s/p CABG, HTN, DM. Presented from [facility] with abd pain and coffee ground emesis. Found to be hypotensive requiring levophed, possibly in the setting of infection or hypovolemia due to UGIB. Anemic to Hb 5.4, with DUSTIN, and with c/f UGIB, s/p 3u PRBC on 02/02. H/H are stable, H/H on 02/07 -- 8.8/25.4.   Course complicated by worsening high AG acidosis and DKA, s/p insulin gtt. Pt now off pressors and hemodynamically stable for transfer to medical floors.    NEURO/PSYCH:  - restarted home citalopram  - AOx 3, calm and pleasant     CV:  #Shock  UGIB vs infection   - Off levophed   - s/p IVF resuscitation.  - s/p empiric zosyn and vanc by level  - Holding home carvedilol, ASA _81 mg daily, -- was restarted 2/7, as per discussion with GI   - Continue midodrine to 20 TID, monitor for bradycardia      #HFrEF, CAD, troponinemia (1320--> 1192)  Potential demand ischemia. Last documented LVEF in [employer] was 27%  - TTE 2/3:  unable to assess EF/LV function.  moderate mitral stenosis. Calcified trileaflet aortic valve with mildly decreased, Grossly the anterolateral and possibly the mid to distal segments of the anterior wall are hypokinetic. The right ventricle is not well visualized.  - Cardiology consult appreciated. Likely demand ischemia.    - No acute intervention per cardiology now     PULM:  #Hx COPD  - Restarted home Symbicort   - Saturating well on RA- 98 %     GI/NUTRITION:  #Coffee ground emesis  Likely UGIB.  on admission.  Downtrending, 44 today  - Cont Pantoprazole 40mg BID   - Holding off on EGD per GI, as per has no active bleeding and is hemodynamically stable,   - Diet as tolerated__ discussed with GI -- 02/07__ advanced to mechanical soft on 02/07   - Cont Bowel regimen     #Abd pain  Likely 2/2 UGIB. POCUS showed cholelithiasis.  Abdominal ultrasound with biliary sludge and cholelithiasis. No acute shellie.    #GERD  - on pantoprazole 40 IV BID__ changed it to 40 PO BID   - takes omeprazole at home    ENDO:  - DKA resolved, restarted Lantus 20 U qhs__ FS-- 84>109,   - On admelog 7u TID and ISS   - Continue to monitor BMP  - Carb consistent diet    NEPHRO//METABOLIC/VOLUME:  #DUSTIN - improving  Likely prerenal/hemodynamically related 2/2 hypotension in setting of GIB/ anemia / DKA  - Cr 3.25 on admission, 1.46 -- 02/07  - ---> 40   - Hall discontinued   - Cont to monitor urine output    #HAGMA - resolved    ID:  #Neutrophilic leukocytosis  Likely 2/2 infection. Procal 3.21 (2/3) , WBC 13.7 (2/3)---> 12.35 today, pt has been afebrile for entirety of MICU stay. T max- 96.8  - s/p IVFs  - cont empiric Zosyn ( started on 2/3 for 5 day course), last day 02/08  - MRSA screen positive (2/3) ,was given 1 dose of vanc on 2/3 and was started on mupirocin x5 day course.   - BCx 2/3- NGTD, to be finalize  - UA 2/3 large leuks, small ketones,  UCx 2/5- neg       HEME/ONC:  #Anemia   Likely 2/2 UGIB.   - Hb 5.6 on admission, received 3U pRBCs on 2/2. Hb has remained stable 8-9 since then. Hb today 8.8  - Hb goal >=8  - CBC daily   -- Heparin SC was restarted, as per discussion with GI,   - Maintain active T&S    #Thrombocytopenia  Likely reactive.  -  today    #VTE ppx: SCDs      ETHICS: Full code    Dispo: transfer to medicine floor, spoke with Sister in law - Christine Chairez -- 2/7 and updated her about pt's condition and transfer to the floor     FOR FOLLOW UP:  [ ]F/U GI recs  [ ]Continue to trend CBC and signs of bleeding. Transfuse prn to maintain Hgb >8.  [ ]cont to monitor urine output and renal function for resolving DUSTIN. Indwelling hall catheter was discontinued on 02/07 and TOV  [] Zosyn until 02/08, and then discontinue   [] F/U PT consult recs 76M with PMHx HFrEF (TTE 8/2011 showed moderate segmental LVSD), CAD s/p CABG, HTN, DM. Presented from [facility] with abd pain and coffee ground emesis. Found to be hypotensive requiring levophed, possibly in the setting of infection or hypovolemia due to UGIB. Anemic to Hb 5.4, with DUSTIN, and with c/f UGIB, s/p 3u PRBC on 02/02. H/H are stable, H/H on 02/07 -- 8.8/25.4.   Course complicated by worsening high AG acidosis and DKA, s/p insulin gtt. Pt now off pressors and hemodynamically stable for transfer to medical floors.    NEURO/PSYCH:  - restarted home citalopram  - AOx 3, calm and pleasant     CV:  #Shock  UGIB vs infection   - Off levophed   - s/p IVF resuscitation.  - s/p empiric zosyn and vanc by level  - Holding home carvedilol, ASA _81 mg daily, -- was restarted 2/7, as per discussion with GI   - Continue midodrine to 20 TID, monitor for bradycardia      #HFrEF, CAD, troponinemia (1320--> 1192)  Potential demand ischemia. Last documented LVEF in [employer] was 27%  - TTE 2/3:  unable to assess EF/LV function.  moderate mitral stenosis. Calcified trileaflet aortic valve with mildly decreased, Grossly the anterolateral and possibly the mid to distal segments of the anterior wall are hypokinetic. The right ventricle is not well visualized.  - Cardiology consult appreciated. Likely demand ischemia.    - No acute intervention per cardiology now     PULM:  #Hx COPD  - Restarted home Symbicort   - Saturating well on RA- 98 %     GI/NUTRITION:  #Coffee ground emesis  Likely UGIB.  on admission.  Downtrending, 44 today  - Cont Pantoprazole 40mg BID   - Holding off on EGD per GI, as per has no active bleeding and is hemodynamically stable,   - Diet as tolerated__ discussed with GI -- 02/07__ advanced to mechanical soft on 02/07   - Cont Bowel regimen   -- intermittent cough with eating --  placed on puree dysphagia diet, S&S ordered __ F/U recs,     #Abd pain  Likely 2/2 UGIB. POCUS showed cholelithiasis.  Abdominal ultrasound with biliary sludge and cholelithiasis. No acute shellie.    #GERD  - on pantoprazole 40 IV BID__ changed it to 40 PO BID   - takes omeprazole at home    ENDO:  - DKA resolved, restarted Lantus 20 U qhs__ FS-- 84>109,   - On admelog 7u TID and ISS   - Continue to monitor BMP  -- FS -- dropped to 62, improved with juice to 84>102,   - placed on puree dysphagia diet,   -- dist     NEPHRO//METABOLIC/VOLUME:  #DUSTIN - improving  Likely prerenal/hemodynamically related 2/2 hypotension in setting of GIB/ anemia / DKA  - Cr 3.25 on admission, 1.46 -- 02/07  - ---> 40   - Hall discontinued   - Cont to monitor urine output    #HAGMA - resolved    ID:  #Neutrophilic leukocytosis  Likely 2/2 infection. Procal 3.21 (2/3) , WBC 13.7 (2/3)---> 12.35 today, pt has been afebrile for entirety of MICU stay. T max- 96.8  - s/p IVFs  - cont empiric Zosyn ( started on 2/3 for 5 day course), last day 02/08  - MRSA screen positive (2/3) ,was given 1 dose of vanc on 2/3 and was started on mupirocin x5 day course.   - BCx 2/3- NGTD, to be finalize  - UA 2/3 large leuks, small ketones,  UCx 2/5- neg       HEME/ONC:  #Anemia   Likely 2/2 UGIB.   - Hb 5.6 on admission, received 3U pRBCs on 2/2. Hb has remained stable 8-9 since then. Hb today 8.8  - Hb goal >=8  - CBC daily   -- Heparin SC was restarted, as per discussion with GI,   - Maintain active T&S    #Thrombocytopenia  Likely reactive.  -  today    #VTE ppx: SCDs      ETHICS: Full code    Dispo: transfer to medicine floor, spoke with Sister in law - Christine Chairez -- 2/7 and updated her about pt's condition and transfer to the floor     FOR FOLLOW UP:  [ ]F/U GI recs  [ ]Continue to trend CBC and signs of bleeding. Transfuse prn to maintain Hgb >8.  [ ]cont to monitor urine output and renal function for resolving DUSTIN. Indwelling hall catheter was discontinued on 02/07 and TOV  [] Zosyn until 02/08, and then discontinue   [] F/U PT consult recs  [] -- intermittent cough with eating --  placed on puree dysphagia diet, S&S ordered __ F/U recs,

## 2022-02-07 NOTE — CHART NOTE - NSCHARTNOTEFT_GEN_A_CORE
MAR Accept Note  Transfer to: () Medicine    () Telemetry  Accepting Physician:  Assigned Room:  PATIENT SEEN AND EXAMINED  NO PHYSICAL EXAM FINDINGS PRECLUDING TRANSFER OF SERVICE    HPI/MICU COURSE:  In short, 76M with PMHx HFrEF (TTE 8/2011 showed moderate segmental LVSD), CAD s/p CABG, HTN, DM presented from nursing home with ab pain and emesis admitted MICU for septic shock vs UGIB requiring pressors.       . Presented from [facility] with abd pain and coffee ground emesis. Found to be hypotensive requiring levophed, possibly in the setting of infection or hypovolemia due to UGIB. Anemic to Hb 5.4, with DUSTIN, and with c/f UGIB, s/p 3u PRBC on 02/02. H/H are stable, H/H on 02/07 -- 8.8/25.4.   Course complicated by worsening high AG acidosis and DKA, s/p insulin gtt. Pt now off pressors and hemodynamically stable for transfer to medical floors.      OBJECTIVE DATA:  Vital Signs Last 24 Hrs  T(C): 36.1 (07 Feb 2022 12:00), Max: 36.6 (07 Feb 2022 04:00)  T(F): 96.9 (07 Feb 2022 12:00), Max: 97.8 (07 Feb 2022 04:00)  HR: 82 (07 Feb 2022 14:00) (73 - 98)  BP: 96/55 (07 Feb 2022 14:00) (85/67 - 114/90)  BP(mean): 66 (07 Feb 2022 14:00) (55 - 96)  RR: 21 (07 Feb 2022 14:00) (16 - 28)  SpO2: 98% (07 Feb 2022 14:00) (95% - 100%)    LABS                                            8.8                   Neurophils% (auto):   76.5   (02-07 @ 03:38):    12.35)-----------(150          Lymphocytes% (auto):  11.7                                          25.4                   Eosinphils% (auto):   0.5      Manual%: Neutrophils x    ; Lymphocytes x    ; Eosinophils x    ; Bands%: x    ; Blasts x                                    138    |  105    |  40                  Calcium: 8.2   / iCa: x      (02-07 @ 03:38)    ----------------------------<  85        Magnesium: 1.60                             3.9     |  17     |  1.46             Phosphorous: 3.4      TPro  5.0    /  Alb  2.2    /  TBili  1.5    /  DBili  x      /  AST  18     /  ALT  6      /  AlkPhos  63     07 Feb 2022 03:38      ASSESSMENT/PLAN & FOLLOW-UP:   see MICU for plan    [ ]F/U GI recs  [ ]Continue to trend CBC and signs of bleeding. Transfuse prn to maintain Hgb >8.  [ ]cont to monitor urine output and renal function for resolving DUSTIN. Indwelling hall catheter was discontinued on 02/07 and TOV  [] Zosyn until 02/08  [] F/U PT consult recs  [] placed on puree dysphagia diet, f/u S&S recs

## 2022-02-07 NOTE — PROGRESS NOTE ADULT - ATTENDING COMMENTS
Recommendations as noted-no plan for endoscopy at current time in view of cardiac and medical comorbidity. Monitor serial hemoglobin/hematocrit. Continue current PPI regimen in conjunction with antireflux precautions (keep head of bed elevated 30°).
#Coffee ground emesis: Reported per NH, no recurrent overt bleeding since arrival in ED. Hypotensive in field, improved in ED with initial transfusion. Hgb now stabilized after 3U pRBC  #Anemia: Hgb 5.7 on arrival, unknown baseline  #Shock: Suspect likely multifactorial, including from infection, UGIB, and possibly cardiogenic    --Continue PPI drip  --Discussed with ICU attending, Dr. Simpson ; given significant cardiac comorbidities and absence of further overt bleeding, will hold off on EGD at this time. Will continue to reassess clinical status and, pending course, determine appropriate timing for possible endoscopic evaluation
#Coffee ground emesis prior to admission: Reported per NH, no recurrent overt bleeding since arrival in ED. Hypotensive in field, improved in ED with initial transfusion. Hgb now stabilized after 3U pRBC, remains in 9s.   #Anemia: Hgb 5.7 on arrival, unknown baseline, likely multifactorial  #Shock: Suspect likely multifactorial, including from infection, UGIB, and possibly cardiogenic    --High dose PPI IV BID for now  --Given stabilized Hgb, absence of overt bleeding, significant comorbidities, will hold off on EGD at this time as discussed with MICU team. Will continue to reassess clinical status and, pending course, determine appropriate timing for possible endoscopic evaluation .
76 year old male with HFrEF, CAD s/p CABG, HTN, DM who presents with abd pain and coffee ground emesis admitted to MICU for hemorrhagic shock due to UGIB.  - Stable CBC - continue to monitor.  - Remains on IV pressors. Increase midodrine and wean norepinephrine as tolerated.  - Continue Protonix BID  - Follow up GI.
76 year old man with history of HFrEF presented from SNF with coffee ground emesis and hypotension also found to be in non-oliguric renal failure    - alert and awake  - NPO for possible EGD continue PPI drip  - monitor CBC  - renal failure with elevated BUN/Creatinine on admission does not all appear to be acute  - continue to monitor 'lytes and urine output will get renal evaluation  - on empiric ABx concern for component of sepsis driving shock  - shock state combination of hypovolemia and sepsis will wean down vasopressors as tolerated  - reduced EF at baseline will get echo  - doing well on room air    prognosis guarded
Patient seen and examined. Patient is a 76M with extensive history including HFrEF (27%), CAD s/p CABG, HTN, DM2, COPD, and MDD who presented to ED with coffee ground emesis and hemorrhagic shock due to UGIB    1. Shock state - likely due to hemorrhagic shock has improved with PRBCs. Will continue to wean Levophed which is almost off. Maintain MAP > 65.   - Close monitoring of volume status - patient presently not overloaded and not in distress  2. Acute Blood loss anemia - due to UGIB. Continue to monitor CBC. Currently appears to have stabilized. Continue Protonix  - GI followup - no palns for intervention at this time  - 3 units PRBC  3. Pulmonary - patient with reported COPD. Currently stable and saturating well on RA. Continue Symbicort  4. Infectious Disease - followup cultures. Continue Zosyn for now. Will stop Vanco as patient clinically nontoxic. MRSA PCR is positive but patient does not appear to have MRSA pneumonia or skin/soft tissue infection.   - Plan to stop Zosyn after 5 days if cultures remain negative  5. DVT Proph - SCD    Prognosis guarded. PT/OT/OOB.
Patient seen and examined. Patient is a 76M with extensive history including HFrEF (27%), CAD s/p CABG, HTN, DM2, COPD, and MDD who presented to ED with coffee ground emesis and hemorrhagic shock due to UGIB    1. Shock state - likely due to hemorrhagic shock has now resolved with PRBCs. No longer requiring vasopressors Maintain MAP > 65.   - Close monitoring of volume status - patient presently not overloaded and not in distress  2. Acute Blood loss anemia - due to UGIB. Continue to monitor CBC. Currently appears to have stabilized. Continue Protonix  - GI followup - no palns for intervention at this time  - 3 units PRBC  3. Pulmonary - patient with reported COPD. Currently stable and saturating well on RA. Continue Symbicort  4. Infectious Disease - followup cultures. Continue Zosyn for now. Will stop Vanco as patient clinically nontoxic. MRSA PCR is positive but patient does not appear to have MRSA pneumonia or skin/soft tissue infection.   - Plan to stop Zosyn after 5 days if cultures remain negative  5. DVT Proph - SCD. Will d/w GI regarding starting HSQ given no plans for intervention and Hb stable    Prognosis guarded. PT/OT/OOB.    Patient is medically stable for transfer to the medical floors for further monitoring and care.
Pt. with hemodynamically mediated DUSTIN in setting of GIB, anemia and hypotension. Scr was elevated at 2.54 yesterday. Scr 1.93 today. Pt. currently non-oliguric. Monitor labs and urine output. Avoid any potential nephrotoxins. Dose medications as per eGFR.
agree with above.  DUSTIN resolving.

## 2022-02-07 NOTE — CHART NOTE - NSCHARTNOTEFT_GEN_A_CORE
MEDICINE ACCEPTANCE NOTE     75 YO M with PMHx of Depression, COPD, HRrEF (TTE 8/2011 showed moderate segmental LVSD), CAD s/p CABG, HTN and DM presented from [facility] with abdominal pain and coffee ground emesis found to be anemic to 5.4 and hypotensive requiring pressors and admitted to MICU. Course further complicated by DUSTIN s/p rehydration via transfusions and HAGMA/ DKA s/p insulin GTT. Now transferred to Medicine 2/7.     OBJECTIVE:  ICU Vital Signs Last 24 Hrs  T(C): 36.1 (07 Feb 2022 12:00), Max: 36.6 (07 Feb 2022 04:00)  T(F): 96.9 (07 Feb 2022 12:00), Max: 97.8 (07 Feb 2022 04:00)  HR: 82 (07 Feb 2022 14:00) (73 - 98)  BP: 96/55 (07 Feb 2022 14:00) (85/67 - 114/90)  BP(mean): 66 (07 Feb 2022 14:00) (55 - 96)  ABP: --  ABP(mean): --  RR: 21 (07 Feb 2022 14:00) (16 - 28)  SpO2: 98% (07 Feb 2022 14:00) (95% - 100%)    02-06 @ 07:01  -  02-07 @ 07:00  --------------------------------------------------------  IN: 250 mL / OUT: 1440 mL / NET: -1190 mL    CAPILLARY BLOOD GLUCOSE  POCT Blood Glucose.: 110 mg/dL (07 Feb 2022 16:35)    HOSPITAL MEDICATIONS:  MEDICATIONS  (STANDING):  aspirin enteric coated 81 milliGRAM(s) Oral daily  atorvastatin 40 milliGRAM(s) Oral daily  budesonide  80 MICROgram(s)/formoterol 4.5 MICROgram(s) Inhaler 2 Puff(s) Inhalation two times a day  chlorhexidine 4% Liquid 1 Application(s) Topical <User Schedule>  citalopram 10 milliGRAM(s) Oral daily  dextrose 40% Gel 15 Gram(s) Oral once  dextrose 40% Gel 15 Gram(s) Oral once  dextrose 5%. 1000 milliLiter(s) (50 mL/Hr) IV Continuous <Continuous>  dextrose 50% Injectable 25 Gram(s) IV Push once  dextrose 50% Injectable 12.5 Gram(s) IV Push once  dextrose 50% Injectable 25 Gram(s) IV Push once  dextrose 50% Injectable 25 Gram(s) IV Push once  dextrose 50% Injectable 12.5 Gram(s) IV Push once  glucagon  Injectable 1 milliGRAM(s) IntraMuscular once  heparin   Injectable 5000 Unit(s) SubCutaneous every 8 hours  insulin glargine Injectable (LANTUS) 20 Unit(s) SubCutaneous at bedtime  insulin lispro (ADMELOG) corrective regimen sliding scale   SubCutaneous three times a day before meals  insulin lispro (ADMELOG) corrective regimen sliding scale   SubCutaneous at bedtime  midodrine 20 milliGRAM(s) Oral every 8 hours  mupirocin 2% Ointment 1 Application(s) Topical two times a day  pantoprazole    Tablet 40 milliGRAM(s) Oral two times a day  piperacillin/tazobactam IVPB.. 3.375 Gram(s) IV Intermittent every 12 hours  polyethylene glycol 3350 17 Gram(s) Oral daily  senna 2 Tablet(s) Oral at bedtime    MEDICATIONS  (PRN):    LABS:                        8.8    12.35 )-----------( 150      ( 07 Feb 2022 03:38 )             25.4     02-07    138  |  105  |  40<H>  ----------------------------<  85  3.9   |  17<L>  |  1.46<H>    Ca    8.2<L>      07 Feb 2022 03:38  Phos  3.4     02-07  Mg     1.60     02-07    TPro  5.0<L>  /  Alb  2.2<L>  /  TBili  1.5<H>  /  DBili  x   /  AST  18  /  ALT  6   /  AlkPhos  63  02-07    ASSESSMENT/ PLAN:   75 YO M with PMHx of Depression, COPD, HRrEF (TTE 8/2011 showed moderate segmental LVSD), CAD s/p CABG, HTN and DM presented from [facility] with abdominal pain and coffee ground emesis found to be anemic to 5.4 and hypotensive requiring pressors and admitted to MICU. Course further complicated by DUSTIN s/p rehydration via transfusions and HAGMA/ DKA s/p insulin GTT. Now transferred to Medicine 2/7.     # NEUROLOGY  - Hx of Depression and on Lexapro. AOx3 as per baseline.   - Monitor mentation.     # CARDIOVASCULAR  - Hx of HFrEF and presented with shock state and tropinemia in setting of demand ischemia/ anemia requiring levophed and transitioned to Midodrine.   - ECHO 2/3 unable to assess EF/ LV function, but moderate MS and anterolateral and possibly mid to distal anterior wall segment hypokinesis.   - Holding home Coreg and continue on Midodrine 20mg TID.   - Continue ASA 81mg QD.   - Cards followed     # RESPIRATORY  - Hx of COPD and continued on Symbicort.   - Duonebs PRN and doing well on RA.     # GI  - CGE and anemia on admission s/p 3 U PRBC.   - ADAT, advanced to mechanical soft on 2/7, however noted with coughing episodes while eating and continued on puree diet pending SS.   - Continue on PPI BID and Bowel Regimen with Senna.     # RENAL  - DUSTIN and HAGMA in setting of dehydration/ anemia and DKA as below.   - CRE 3.25 on admission and now improving.   - Making good UOP and Hung removed 2/7, pending TOV.     # INFECTIOUS DISEASE  - Neutrophilic leukocytosis likely second to infection. PCT 3.21 (2/3)   - BCx/ UCx NGTD. Continue on empiric Zosyn (2/3 - 2/8) for 5 day course.   - MRSA PCR (+) and continued on Bactroban (2/6-2/10)      # HEME  - Anemia in setting of GIB and HH on admission 5.6 s/p 3 U PRBC (2/2) with good response. Hb has remained stable 8-9 since then.   - Thrombocytopenia likely reactive vs consumptive in setting of bleed.   - Monitor HH and PLTs.   - DVT PPX with HSQ    # ENDOCRINE  - HAGMA/ DKA, DM2 A1C 6.3 (2/2022) and s/p insulin GTT.   - Now transitioned to basal/ bolus with Lantus 20 U QHS and ISS. Monitor FS ACHS.     # ETHICS/ GOC  - FULL CODE     # DISPO - PT consult pending.     Diogo Dooley PA-C  Department of Medicine/ [facility]  [facility] #[id number]

## 2022-02-07 NOTE — PROVIDER CONTACT NOTE (HYPOGLYCEMIA EVENT) - NS PROVIDER CONTACT BACKGROUND-HYPO
Age: 76y    Gender: Male    POCT Blood Glucose:  102 mg/dL (02-07-22 @ 13:41)  94 mg/dL (02-07-22 @ 13:28)  82 mg/dL (02-07-22 @ 13:10)  84 mg/dL (02-07-22 @ 12:50)  62 mg/dL (02-07-22 @ 12:31)  64 mg/dL (02-07-22 @ 12:28)  109 mg/dL (02-07-22 @ 08:42)  84 mg/dL (02-06-22 @ 21:10)      eMAR:atorvastatin   40 milliGRAM(s) Oral (02-07-22 @ 13:20)    insulin lispro (ADMELOG) corrective regimen sliding scale   0 Unit(s) SubCutaneous (02-06-22 @ 21:29)    insulin lispro Injectable (ADMELOG)   7 Unit(s) SubCutaneous (02-07-22 @ 08:50)

## 2022-02-07 NOTE — PROVIDER CONTACT NOTE (HYPOGLYCEMIA EVENT) - NS PROVIDER CONTACT SITUATION-HYPO
Patient has FS 0f 64 and when repeated was 62. Patient A&Ox3. Denies any discomfort.  Apple juice given x2 but  and yogurt given po. FS repeated till FS.100x2. MD made aware of poor intake.

## 2022-02-07 NOTE — PROGRESS NOTE ADULT - ASSESSMENT
76M with PMHx HFrEF (TTE 8/2011 showed moderate segmental LVSD), CAD s/p CABG, HTN, DM. Presented from [facility] with abd pain and coffee ground emesis. Found to be hypotensive requiring levophed, anemic to Hb 5.4, with DUSTIN, and with c/f UGIB, s/p 3u PRBC. Course complicated by worsening high AG acidosis and DKA, s/p insulin gtt. Pt now off pressors and hemodynamically stable for medical floors.    NEURO/PSYCH:  - Holding home citalopram  - AOx4, mentating well    CV:  #Shock  UGIB vs  infection   - Off levophed   - s/p IVF resuscitation.  - s/p empiric zosyn and vanc by level  - Holding home carvedilol  - Continue midodrine to 20 TID, monitor for bradycardia      #HFrEF, CAD, troponinemia (1320--> 1192)  Potential demand ischemia. Last documented LVEF in [employer] was 27%  - TTE 2/3:  unable to assess EF/LV function.  moderate mitral stenosis. Calcified trileaflet aortic valve with mildly decreased, Grossly the anterolateral and possibly the mid to distal  segments of the anterior wall are hypokinetic. The right ventricle is not well visualized.  - Cardiology consult appreciated. Likely demand ischemia.    - No acute intervention per cardiology now     PULM:  #Hx COPD  - Restart home Symbicort   - Saturating well on RA    GI/NUTRITION:  #Coffee ground emesis  Likely UGIB.  on admission.  Downtrending, 44 today  - Cont Pantoprazole 40mg BID   - Holding off on EGD per GI   - Diet as tolerated  - Cont Bowel regimen     #Abd pain  Likely 2/2 UGIB. POCUS showed cholelithiasis.  Abdominal ultrasound with biliary sludge and cholelithiasis. No acute shellie.    #GERD  - on pantoprazole 40 IV BID  - takes omeprazole at home    ENDO:  - DKA resolved  - On admelog 7u TID and ISS   - Continue to monitor BMP  - Carb consistent diet    NEPHRO//METABOLIC/VOLUME:  #DUSTIN - improving  Likely prerenal/hemodynamically related 2/2 hypotension in setting of GIB/ anemia / DKA  - Cr 3.25 on admission, 1.36 this AM  - ---> 44   - Hung  - Cont to monitor urine output    #HAGMA - resolved    ID:  #Neutrophilic leukocytosis  Likely 2/2 infection. Procal 3.21 (2/3) , WBC 13.7 (2/3)---> 11.92 today, pt has been afebrile for entirety of MICU stay.  - s/p IVFs  - cont empiric Zosyn ( started on 2/3 for 7 day course)  - MRSA screen positive (2/3) ,was given 1 dose of vanc on 2/3 and was started on mupirocin x5 day course.   - BCx 2/3- neg  - UA 2/3 large leuks, small ketones,  UCx 2/5- neg       HEME/ONC:  #Anemia   Likely 2/2 UGIB.   - Hb 5.6 on admission, received 3U pRBCs on 2/2. Hb has remained stable 8-9 since then. Hb today 8.2.  - Hb goal >=8  - CBC daily   - Maintain active T&S    #Thrombocytopenia  Likely reactive.  -  today    #VTE ppx: SCDs      ETHICS: Full code      FOR FOLLOW UP:  [ ]F/U GI recs  [ ]Continue to trend CBC and signs of bleeding. Transfuse prn to maintain Hgb >8.  [ ]cont to monitor urine output and renal function for resolving DUSTIN.   76M with PMHx HFrEF (TTE 8/2011 showed moderate segmental LVSD), CAD s/p CABG, HTN, DM. Presented from [facility] with abd pain and coffee ground emesis. Found to be hypotensive requiring levophed, anemic to Hb 5.4, with DUSTIN, and with c/f UGIB, s/p 3u PRBC. Course complicated by worsening high AG acidosis and DKA, s/p insulin gtt. Pt now off pressors and hemodynamically stable for medical floors.    NEURO/PSYCH:  - restarted home citalopram  - AOx2-3, calm and pleasant     CV:  #Shock  UGIB vs infection   - Off levophed   - s/p IVF resuscitation.  - s/p empiric zosyn and vanc by level  - Holding home carvedilol, ASA _81 mg daily, -- was restarted 2/7, as per discussion with GI   - Continue midodrine to 20 TID, monitor for bradycardia      #HFrEF, CAD, troponinemia (1320--> 1192)  Potential demand ischemia. Last documented LVEF in [employer] was 27%  - TTE 2/3:  unable to assess EF/LV function.  moderate mitral stenosis. Calcified trileaflet aortic valve with mildly decreased, Grossly the anterolateral and possibly the mid to distal segments of the anterior wall are hypokinetic. The right ventricle is not well visualized.  - Cardiology consult appreciated. Likely demand ischemia.    - No acute intervention per cardiology now     PULM:  #Hx COPD  - Restarted home Symbicort   - Saturating well on RA- 98 %     GI/NUTRITION:  #Coffee ground emesis  Likely UGIB.  on admission.  Downtrending, 44 today  - Cont Pantoprazole 40mg BID   - Holding off on EGD per GI, as per has no active bleeding and is hemodynamically stable,   - Diet as tolerated__ discussed with GI -- 02/07__ advanced to mechanical soft on 02/07   - Cont Bowel regimen     #Abd pain  Likely 2/2 UGIB. POCUS showed cholelithiasis.  Abdominal ultrasound with biliary sludge and cholelithiasis. No acute shellie.    #GERD  - on pantoprazole 40 IV BID__ changed it to 40 PO BID   - takes omeprazole at home    ENDO:  - DKA resolved, restarted Lantus 20 U qhs__ FS-- 84>109,   - On admelog 7u TID and ISS   - Continue to monitor BMP  - Carb consistent diet    NEPHRO//METABOLIC/VOLUME:  #DUSTIN - improving  Likely prerenal/hemodynamically related 2/2 hypotension in setting of GIB/ anemia / DKA  - Cr 3.25 on admission, 1.46 -- 02/07  - ---> 40   - Hall discontinued   - Cont to monitor urine output    #HAGMA - resolved    ID:  #Neutrophilic leukocytosis  Likely 2/2 infection. Procal 3.21 (2/3) , WBC 13.7 (2/3)---> 12.35 today, pt has been afebrile for entirety of MICU stay. T max- 96.8  - s/p IVFs  - cont empiric Zosyn ( started on 2/3 for 5 day course), last day 02/08  - MRSA screen positive (2/3) ,was given 1 dose of vanc on 2/3 and was started on mupirocin x5 day course.   - BCx 2/3- NGTD, to be finalize  - UA 2/3 large leuks, small ketones,  UCx 2/5- neg       HEME/ONC:  #Anemia   Likely 2/2 UGIB.   - Hb 5.6 on admission, received 3U pRBCs on 2/2. Hb has remained stable 8-9 since then. Hb today 8.8  - Hb goal >=8  - CBC daily   -- Heparin SC was restarted, as per discussion with GI,   - Maintain active T&S    #Thrombocytopenia  Likely reactive.  -  today    #VTE ppx: SCDs      ETHICS: Full code    Dispo: transfer to medicine floor     FOR FOLLOW UP:  [ ]F/U GI recs  [ ]Continue to trend CBC and signs of bleeding. Transfuse prn to maintain Hgb >8.  [ ]cont to monitor urine output and renal function for resolving DUSTIN. Indwelling hall catheter was discontinued on 02/07 and TOV  [] Zosyn until 02/08, and then discontinue   [] F/U PT consult recs

## 2022-02-07 NOTE — PROGRESS NOTE ADULT - SUBJECTIVE AND OBJECTIVE BOX
Chief Complaint:  Patient is a 76y old  Male who presents with a chief complaint of Abdominal pain (07 Feb 2022 10:11)      Interval Events: remains on midodrine  - no overt bleeding  - hgb stable      Hospital Medications:  atorvastatin 40 milliGRAM(s) Oral daily  budesonide  80 MICROgram(s)/formoterol 4.5 MICROgram(s) Inhaler 2 Puff(s) Inhalation two times a day  chlorhexidine 4% Liquid 1 Application(s) Topical <User Schedule>  citalopram 10 milliGRAM(s) Oral daily  dextrose 40% Gel 15 Gram(s) Oral once  dextrose 40% Gel 15 Gram(s) Oral once  dextrose 5% + lactated ringers. 1000 milliLiter(s) IV Continuous <Continuous>  dextrose 5%. 1000 milliLiter(s) IV Continuous <Continuous>  dextrose 50% Injectable 25 Gram(s) IV Push once  dextrose 50% Injectable 12.5 Gram(s) IV Push once  dextrose 50% Injectable 25 Gram(s) IV Push once  dextrose 50% Injectable 25 Gram(s) IV Push once  dextrose 50% Injectable 12.5 Gram(s) IV Push once  glucagon  Injectable 1 milliGRAM(s) IntraMuscular once  heparin   Injectable 5000 Unit(s) SubCutaneous every 8 hours  insulin glargine Injectable (LANTUS) 20 Unit(s) SubCutaneous at bedtime  insulin lispro (ADMELOG) corrective regimen sliding scale   SubCutaneous three times a day before meals  insulin lispro (ADMELOG) corrective regimen sliding scale   SubCutaneous at bedtime  insulin lispro Injectable (ADMELOG) 7 Unit(s) SubCutaneous three times a day before meals  midodrine 20 milliGRAM(s) Oral every 8 hours  mupirocin 2% Ointment 1 Application(s) Topical two times a day  pantoprazole    Tablet 40 milliGRAM(s) Oral two times a day  piperacillin/tazobactam IVPB.. 3.375 Gram(s) IV Intermittent every 12 hours  polyethylene glycol 3350 17 Gram(s) Oral daily  potassium chloride   Powder 20 milliEquivalent(s) Oral once  senna 2 Tablet(s) Oral at bedtime      PMHX/PSHX:  Cigarette Smoker    Essential Hypertension    CHF (Congestive Heart Failure)    Diabetes Mellitus    Coronary Artery Disease (CAD) Excluded    CAD (Coronary Atherosclerotic Disease)    Hypercholesteremia    CABG (Coronary Artery Bypass Graft)    History of Appendectomy    CABG (Coronary Artery Bypass Graft)            ROS:     General:  No weight loss, fevers, chills, night sweats, fatigue   Eyes:  No vision changes  ENT:  No sore throat, pain, runny nose  CV:  No chest pain, palpitations, dizziness   Resp:  No SOB, cough, wheezing  GI:  See HPI  :  No burning with urination, hematuria  Muscle:  No pain, weakness  Neuro:  No weakness/tingling, memory problems  Psych:  No fatigue, insomnia, mood problems, depression  Heme:  No easy bruisability  Skin:  No rash, edema      PHYSICAL EXAM:     GENERAL:  Well developed, no distress  HEENT:  NC/AT,  conjunctivae clear, sclera anicteric  CHEST:  Full & symmetric excursion, no increased effort w/ respirations  HEART:  Regular rhythm & rate  ABDOMEN:  Soft, non-tender, non-distended  EXTREMITIES:  no LE  edema  SKIN:  No rash/erythema/ecchymoses/petechiae/wounds/jaundice  NEURO:  Alert, oriented    Vital Signs:  Vital Signs Last 24 Hrs  T(C): 36 (07 Feb 2022 08:00), Max: 36.6 (07 Feb 2022 04:00)  T(F): 96.8 (07 Feb 2022 08:00), Max: 97.8 (07 Feb 2022 04:00)  HR: 81 (07 Feb 2022 10:00) (73 - 98)  BP: 92/77 (07 Feb 2022 10:00) (85/67 - 132/68)  BP(mean): 81 (07 Feb 2022 10:00) (55 - 102)  RR: 22 (07 Feb 2022 10:00) (16 - 28)  SpO2: 95% (07 Feb 2022 10:00) (95% - 100%)  Daily     Daily     LABS:                        8.8    12.35 )-----------( 150      ( 07 Feb 2022 03:38 )             25.4     02-07    138  |  105  |  40<H>  ----------------------------<  85  3.9   |  17<L>  |  1.46<H>    Ca    8.2<L>      07 Feb 2022 03:38  Phos  3.4     02-07  Mg     1.60     02-07    TPro  5.0<L>  /  Alb  2.2<L>  /  TBili  1.5<H>  /  DBili  x   /  AST  18  /  ALT  6   /  AlkPhos  63  02-07    LIVER FUNCTIONS - ( 07 Feb 2022 03:38 )  Alb: 2.2 g/dL / Pro: 5.0 g/dL / ALK PHOS: 63 U/L / ALT: 6 U/L / AST: 18 U/L / GGT: x                   Imaging:

## 2022-02-07 NOTE — PROGRESS NOTE ADULT - ASSESSMENT
76M with CHF (EF 27% per [employer] in 2011), CAD s/p CABG 2010, CVA with hemiparesis, GERD, MDD, COPD presenting from NH with coffee ground emesis, encephalopathy and hypotension.    Impression:  #Coffee ground emesis - hgb 5.7 concerning for UGIB. Ddx includes stress ulcer, peptic ulcer disease, erosive gastritis, severe reflux esophagitis, MW tear, NSAID gastropathy, Dieulafoy's lesion, angioectasia. No known hx of liver disease.   #CHF - last documented EF in [employer]scripts 27%    Recommendations:  - given significant comorbidities with no overt bleeding and stable hgb, would defer endoscopic evaluation  - can c/w PPI PO given at risk for stress ulcer  - regular diet  - GI will sign off, please call back if develops overt bleeding or drop in hgb      Cassandra Trejo PGY-5  Gastroenterology Fellow  Pager #[phone] ([facility]) or [phone] (NS)  Available on [employer] Teams.  Please contact on-call GI fellow via answering service ([phone]) after 5pm and before 8am, and on weekends.

## 2022-02-08 DIAGNOSIS — E11.9 TYPE 2 DIABETES MELLITUS WITHOUT COMPLICATIONS: ICD-10-CM

## 2022-02-08 DIAGNOSIS — R33.8 OTHER RETENTION OF URINE: ICD-10-CM

## 2022-02-08 DIAGNOSIS — D62 ACUTE POSTHEMORRHAGIC ANEMIA: ICD-10-CM

## 2022-02-08 LAB
ANION GAP SERPL CALC-SCNC: 16 MMOL/L — HIGH (ref 7–14)
BUN SERPL-MCNC: 35 MG/DL — HIGH (ref 7–23)
CALCIUM SERPL-MCNC: 7.8 MG/DL — LOW (ref 8.4–10.5)
CHLORIDE SERPL-SCNC: 100 MMOL/L — SIGNIFICANT CHANGE UP (ref 98–107)
CO2 SERPL-SCNC: 19 MMOL/L — LOW (ref 22–31)
CREAT SERPL-MCNC: 1.63 MG/DL — HIGH (ref 0.5–1.3)
CULTURE RESULTS: SIGNIFICANT CHANGE UP
GLUCOSE BLDC GLUCOMTR-MCNC: 104 MG/DL — HIGH (ref 70–99)
GLUCOSE BLDC GLUCOMTR-MCNC: 112 MG/DL — HIGH (ref 70–99)
GLUCOSE BLDC GLUCOMTR-MCNC: 113 MG/DL — HIGH (ref 70–99)
GLUCOSE BLDC GLUCOMTR-MCNC: 145 MG/DL — HIGH (ref 70–99)
GLUCOSE BLDC GLUCOMTR-MCNC: 157 MG/DL — HIGH (ref 70–99)
GLUCOSE SERPL-MCNC: 276 MG/DL — HIGH (ref 70–99)
HCT VFR BLD CALC: 22.4 % — LOW (ref 39–50)
HCT VFR BLD CALC: 24.5 % — LOW (ref 39–50)
HGB BLD-MCNC: 7.7 G/DL — LOW (ref 13–17)
HGB BLD-MCNC: 7.9 G/DL — LOW (ref 13–17)
MAGNESIUM SERPL-MCNC: 1.8 MG/DL — SIGNIFICANT CHANGE UP (ref 1.6–2.6)
MCHC RBC-ENTMCNC: 29.4 PG — SIGNIFICANT CHANGE UP (ref 27–34)
MCHC RBC-ENTMCNC: 30.2 PG — SIGNIFICANT CHANGE UP (ref 27–34)
MCHC RBC-ENTMCNC: 32.2 GM/DL — SIGNIFICANT CHANGE UP (ref 32–36)
MCHC RBC-ENTMCNC: 34.4 GM/DL — SIGNIFICANT CHANGE UP (ref 32–36)
MCV RBC AUTO: 87.8 FL — SIGNIFICANT CHANGE UP (ref 80–100)
MCV RBC AUTO: 91.1 FL — SIGNIFICANT CHANGE UP (ref 80–100)
NRBC # BLD: 0 /100 WBCS — SIGNIFICANT CHANGE UP
NRBC # BLD: 0 /100 WBCS — SIGNIFICANT CHANGE UP
NRBC # FLD: 0 K/UL — SIGNIFICANT CHANGE UP
NRBC # FLD: 0.02 K/UL — HIGH
PHOSPHATE SERPL-MCNC: 3.7 MG/DL — SIGNIFICANT CHANGE UP (ref 2.5–4.5)
PLATELET # BLD AUTO: 160 K/UL — SIGNIFICANT CHANGE UP (ref 150–400)
PLATELET # BLD AUTO: 168 K/UL — SIGNIFICANT CHANGE UP (ref 150–400)
POTASSIUM SERPL-MCNC: 3.8 MMOL/L — SIGNIFICANT CHANGE UP (ref 3.5–5.3)
POTASSIUM SERPL-SCNC: 3.8 MMOL/L — SIGNIFICANT CHANGE UP (ref 3.5–5.3)
RBC # BLD: 2.55 M/UL — LOW (ref 4.2–5.8)
RBC # BLD: 2.69 M/UL — LOW (ref 4.2–5.8)
RBC # FLD: 16.5 % — HIGH (ref 10.3–14.5)
RBC # FLD: 17 % — HIGH (ref 10.3–14.5)
SODIUM SERPL-SCNC: 135 MMOL/L — SIGNIFICANT CHANGE UP (ref 135–145)
SPECIMEN SOURCE: SIGNIFICANT CHANGE UP
WBC # BLD: 12.38 K/UL — HIGH (ref 3.8–10.5)
WBC # BLD: 13.02 K/UL — HIGH (ref 3.8–10.5)
WBC # FLD AUTO: 12.38 K/UL — HIGH (ref 3.8–10.5)
WBC # FLD AUTO: 13.02 K/UL — HIGH (ref 3.8–10.5)

## 2022-02-08 PROCEDURE — 99232 SBSQ HOSP IP/OBS MODERATE 35: CPT

## 2022-02-08 RX ORDER — SODIUM CHLORIDE 9 MG/ML
1000 INJECTION INTRAMUSCULAR; INTRAVENOUS; SUBCUTANEOUS
Refills: 0 | Status: DISCONTINUED | OUTPATIENT
Start: 2022-02-08 | End: 2022-02-09

## 2022-02-08 RX ORDER — TAMSULOSIN HYDROCHLORIDE 0.4 MG/1
0.4 CAPSULE ORAL AT BEDTIME
Refills: 0 | Status: DISCONTINUED | OUTPATIENT
Start: 2022-02-08 | End: 2022-02-18

## 2022-02-08 RX ADMIN — PANTOPRAZOLE SODIUM 40 MILLIGRAM(S): 20 TABLET, DELAYED RELEASE ORAL at 17:02

## 2022-02-08 RX ADMIN — PANTOPRAZOLE SODIUM 40 MILLIGRAM(S): 20 TABLET, DELAYED RELEASE ORAL at 05:28

## 2022-02-08 RX ADMIN — MUPIROCIN 1 APPLICATION(S): 20 OINTMENT TOPICAL at 17:02

## 2022-02-08 RX ADMIN — MIDODRINE HYDROCHLORIDE 20 MILLIGRAM(S): 2.5 TABLET ORAL at 00:02

## 2022-02-08 RX ADMIN — SODIUM CHLORIDE 42 MILLILITER(S): 9 INJECTION INTRAMUSCULAR; INTRAVENOUS; SUBCUTANEOUS at 17:06

## 2022-02-08 RX ADMIN — INSULIN GLARGINE 20 UNIT(S): 100 INJECTION, SOLUTION SUBCUTANEOUS at 21:40

## 2022-02-08 RX ADMIN — Medication 81 MILLIGRAM(S): at 11:49

## 2022-02-08 RX ADMIN — CITALOPRAM 10 MILLIGRAM(S): 10 TABLET, FILM COATED ORAL at 11:49

## 2022-02-08 RX ADMIN — MUPIROCIN 1 APPLICATION(S): 20 OINTMENT TOPICAL at 05:29

## 2022-02-08 RX ADMIN — POLYETHYLENE GLYCOL 3350 17 GRAM(S): 17 POWDER, FOR SOLUTION ORAL at 11:50

## 2022-02-08 RX ADMIN — CHLORHEXIDINE GLUCONATE 1 APPLICATION(S): 213 SOLUTION TOPICAL at 05:59

## 2022-02-08 RX ADMIN — Medication 2: at 11:50

## 2022-02-08 RX ADMIN — TAMSULOSIN HYDROCHLORIDE 0.4 MILLIGRAM(S): 0.4 CAPSULE ORAL at 21:41

## 2022-02-08 RX ADMIN — ATORVASTATIN CALCIUM 40 MILLIGRAM(S): 80 TABLET, FILM COATED ORAL at 11:49

## 2022-02-08 RX ADMIN — HEPARIN SODIUM 5000 UNIT(S): 5000 INJECTION INTRAVENOUS; SUBCUTANEOUS at 21:41

## 2022-02-08 RX ADMIN — BUDESONIDE AND FORMOTEROL FUMARATE DIHYDRATE 2 PUFF(S): 160; 4.5 AEROSOL RESPIRATORY (INHALATION) at 21:43

## 2022-02-08 RX ADMIN — PIPERACILLIN AND TAZOBACTAM 25 GRAM(S): 4; .5 INJECTION, POWDER, LYOPHILIZED, FOR SOLUTION INTRAVENOUS at 03:27

## 2022-02-08 RX ADMIN — MIDODRINE HYDROCHLORIDE 20 MILLIGRAM(S): 2.5 TABLET ORAL at 11:49

## 2022-02-08 RX ADMIN — BUDESONIDE AND FORMOTEROL FUMARATE DIHYDRATE 2 PUFF(S): 160; 4.5 AEROSOL RESPIRATORY (INHALATION) at 07:54

## 2022-02-08 RX ADMIN — SENNA PLUS 2 TABLET(S): 8.6 TABLET ORAL at 21:41

## 2022-02-08 RX ADMIN — PIPERACILLIN AND TAZOBACTAM 25 GRAM(S): 4; .5 INJECTION, POWDER, LYOPHILIZED, FOR SOLUTION INTRAVENOUS at 17:02

## 2022-02-08 RX ADMIN — MIDODRINE HYDROCHLORIDE 20 MILLIGRAM(S): 2.5 TABLET ORAL at 17:02

## 2022-02-08 RX ADMIN — HEPARIN SODIUM 5000 UNIT(S): 5000 INJECTION INTRAVENOUS; SUBCUTANEOUS at 13:10

## 2022-02-08 NOTE — PHYSICAL THERAPY INITIAL EVALUATION ADULT - PERTINENT HX OF CURRENT PROBLEM, REHAB EVAL
76M presents from [facility] with abdominal pain and coffee ground emesis. Patient states that he started experiencing acute onset of R lower quadrant abdominal pain x 1 week, described as a 7/10 intermittent, sharp, non-radiating pain

## 2022-02-08 NOTE — SWALLOW BEDSIDE ASSESSMENT ADULT - ASR SWALLOW RECOMMEND DIAG
Cinesophagram NOT indicated due to overt signs and symptoms of laryngeal penetration/aspiration at bedside

## 2022-02-08 NOTE — PHYSICAL THERAPY INITIAL EVALUATION ADULT - ACTIVE RANGE OF MOTION EXAMINATION, REHAB EVAL
Decreased AROM in left LE since prior CVA/bilateral upper extremity Active ROM was WFL (within functional limits)/Right LE Active ROM was WFL (within functional limits)

## 2022-02-08 NOTE — PHYSICAL THERAPY INITIAL EVALUATION ADULT - PATIENT PROFILE REVIEW, REHAB EVAL
Activity orders - increase as tolerated. Spoke to MAYANK Das, pt cleared for participation in PT evaluation/yes

## 2022-02-08 NOTE — PROGRESS NOTE ADULT - SUBJECTIVE AND OBJECTIVE BOX
PROGRESS NOTE:     Patient is a 76y old  Male who presents with a chief complaint of Abdominal pain (07 Feb 2022 13:16)    SUBJECTIVE / OVERNIGHT EVENTS:  denies sob, denies abdominal pain , denies nausea  was having difficulty voiding, hall re inserted due to urine retention     ADDITIONAL REVIEW OF SYSTEMS:  denies fever , cough , dysuria     MEDICATIONS  (STANDING):  aspirin enteric coated 81 milliGRAM(s) Oral daily  atorvastatin 40 milliGRAM(s) Oral daily  budesonide  80 MICROgram(s)/formoterol 4.5 MICROgram(s) Inhaler 2 Puff(s) Inhalation two times a day  chlorhexidine 4% Liquid 1 Application(s) Topical <User Schedule>  citalopram 10 milliGRAM(s) Oral daily  dextrose 40% Gel 15 Gram(s) Oral once  dextrose 40% Gel 15 Gram(s) Oral once  dextrose 5%. 1000 milliLiter(s) (50 mL/Hr) IV Continuous <Continuous>  dextrose 50% Injectable 25 Gram(s) IV Push once  dextrose 50% Injectable 12.5 Gram(s) IV Push once  dextrose 50% Injectable 25 Gram(s) IV Push once  dextrose 50% Injectable 25 Gram(s) IV Push once  dextrose 50% Injectable 12.5 Gram(s) IV Push once  glucagon  Injectable 1 milliGRAM(s) IntraMuscular once  heparin   Injectable 5000 Unit(s) SubCutaneous every 8 hours  insulin glargine Injectable (LANTUS) 20 Unit(s) SubCutaneous at bedtime  insulin lispro (ADMELOG) corrective regimen sliding scale   SubCutaneous three times a day before meals  insulin lispro (ADMELOG) corrective regimen sliding scale   SubCutaneous at bedtime  midodrine 20 milliGRAM(s) Oral every 8 hours  mupirocin 2% Ointment 1 Application(s) Topical two times a day  pantoprazole    Tablet 40 milliGRAM(s) Oral two times a day  piperacillin/tazobactam IVPB.. 3.375 Gram(s) IV Intermittent every 12 hours  polyethylene glycol 3350 17 Gram(s) Oral daily  senna 2 Tablet(s) Oral at bedtime    MEDICATIONS  (PRN):      CAPILLARY BLOOD GLUCOSE      POCT Blood Glucose.: 157 mg/dL (08 Feb 2022 11:35)  POCT Blood Glucose.: 104 mg/dL (08 Feb 2022 08:29)  POCT Blood Glucose.: 112 mg/dL (08 Feb 2022 07:40)  POCT Blood Glucose.: 118 mg/dL (07 Feb 2022 21:19)  POCT Blood Glucose.: 110 mg/dL (07 Feb 2022 16:35)    I&O's Summary    07 Feb 2022 07:01  -  08 Feb 2022 07:00  --------------------------------------------------------  IN: 140 mL / OUT: 400 mL / NET: -260 mL        PHYSICAL EXAM:  Vital Signs Last 24 Hrs  T(C): 36.7 (08 Feb 2022 12:00), Max: 37 (08 Feb 2022 00:00)  T(F): 98.1 (08 Feb 2022 12:00), Max: 98.6 (08 Feb 2022 00:00)  HR: 66 (08 Feb 2022 14:00) (66 - 95)  BP: 98/54 (08 Feb 2022 14:00) (90/49 - 118/91)  BP(mean): 60 (08 Feb 2022 14:00) (49 - 98)  RR: 15 (08 Feb 2022 12:00) (15 - 22)  SpO2: 99% (08 Feb 2022 14:00) (96% - 100%)    awake, alert oriented to self   LUNGS  diminished air entry no wheezes or rales   S1, S2 rrr  abdomen soft nontender  MOVING ALL EXTREMITIES       LABS:                        7.7    12.38 )-----------( 160      ( 08 Feb 2022 05:04 )             22.4     02-08    135  |  100  |  35<H>  ----------------------------<  276<H>  3.8   |  19<L>  |  1.63<H>    Ca    7.8<L>      08 Feb 2022 05:04  Phos  3.7     02-08  Mg     1.80     02-08    TPro  5.0<L>  /  Alb  2.2<L>  /  TBili  1.5<H>  /  DBili  x   /  AST  18  /  ALT  6   /  AlkPhos  63  02-07      RADIOLOGY & ADDITIONAL TESTS:  Results Reviewed: y  Imaging Personally Reviewed:y  Electrocardiogram Personally Reviewed:n    COORDINATION OF CARE:  Care Discussed with Consultants/Other Providers [Y/N]:y  Prior or Outpatient Records Reviewed [Y/N]:n

## 2022-02-08 NOTE — SWALLOW BEDSIDE ASSESSMENT ADULT - SWALLOW EVAL: DIAGNOSIS
1. Functional oral phase for thin, mildly and moderately thick liquids, and pureed marked by adequate oral aperture, bolus collection and timely A-P transport. 2. Moderate-severe oral dysphagia for soft and bite sized marked by adequate oral aperture, significantly increased mastication time and delayed A-P transport likely due to edentulous status. Piecemeal deglutition noted. 3. Functional pharyngeal phase for mildly and moderately thick liquids, pureed, and soft and bite sized marked by initiation of the pharyngeal swallow with hyolaryngeal elevation and excursion upon digital palpation without evidence of airway invasion. 4. Moderate pharyngeal dysphagia for thin liquids marked by suspected delay in swallow onset with hyolaryngeal elevation and excursion upon palpation. There was an immediate cough after the swallow for thin liquids indicative of laryngeal penetration/aspiration.

## 2022-02-08 NOTE — SWALLOW BEDSIDE ASSESSMENT ADULT - COMMENTS
As per accepting PA chart note "75 YO M with PMHx of Depression, COPD, HRrEF (TTE 8/2011 showed moderate segmental LVSD), CAD s/p CABG, HTN and DM presented from [facility] with abdominal pain and coffee ground emesis found to be anemic to 5.4 and hypotensive requiring pressors and admitted to MICU. Course further complicated by DUSTIN s/p rehydration via transfusions and HAGMA/ DKA s/p insulin GTT. Now transferred to Medicine 2/7."     Chest X-ray 2/3/22 revealed "no focal consolidation". WBC is elevated (12.38) .    Patient seen for bedside swallow assessment. As per RN, patient with coughing episode on minced and moist. Patient alert and oriented, able to follow all necessary directives and make wants and needs known.

## 2022-02-08 NOTE — CHART NOTE - NSCHARTNOTEFT_GEN_A_CORE
Pt failed TOV, unable to void this morning, bladder scan initially completed by night RN ~650cc, repeated by day RN >1000cc, multiple attempts by RN to straight cath, but met resistance each time. Pt seen and examined, A+O x 3, denies suprapubic/abdominal discomfort, increased urgency. Abdomen soft NT/ND, no guarding or rebound tenderness, +suprapubic fullness appreciated, non-tender. Case discussed with Dr. Reese, Pt started on flomax, urology called for assistance with hall placement. Will continue to monitor, Is and Os.

## 2022-02-08 NOTE — PHYSICAL THERAPY INITIAL EVALUATION ADULT - ADDITIONAL COMMENTS
Pt states he has been living in [facility] and has not walked in over 6 months. Pt reports he uses a wheelchair at baseline.

## 2022-02-08 NOTE — PROGRESS NOTE ADULT - ASSESSMENT
76M with PMHx HFrEF (TTE 8/2011 showed moderate segmental LVSD), CAD s/p CABG, HTN, DM, MDD, hx of COPD, hx of CVA with hemiparesis 2011, GERD and GI bleed in the past.   Presented 2-2-2022  from [facility] with  due to coffee ground emesis, bp 70/40mmHg, AMS and  reported he had complained of  right lower quadrant sharp abd pain 1 week duration Admitted to the ICU due to shock  requiring levophed, anemic to Hb 5.6, with hyponatremia 128 , DUSTIN bun 165-3.25 , s/p 3u PRBC. Course complicated by worsening high AG acidosis and DKA, s/p insulin gtt. Pt now off pressors, on midodrine  transferred to medical  floors 2/7/2022    hemorrhagic Shock vs septic  - resolved   UGIB vs infection   - Off levophed   - s/p IVF resuscitation.  - s/p empiric zosyn and vanc by level  - Holding home carvedilol, ASA _81 mg daily, -- was restarted 2/7, as per discussion with GI   - Continue midodrine to 20 TID, monitor for bradycardia      acute upper GI bleed with  Coffee ground emesis DDX stress PUD from dustin     on admission.  Downtrending, 44 today  - Cont Pantoprazole 40mg BID   - Holding off on EGD per GI, as per has no active bleeding and is hemodynamically stable,   - Diet as tolerated__ discussed with GI -- 02/07__ advanced to mechanical soft on 02/07   - Cont Bowel regimen     acute blood loss Anemia   Likely 2/2 UGIB and dustin   - Hb 5.6 on admission, received 3U pRBCs on 2/2. Hb, has remained  relative stable 8-9 since then. Hb today 7.7g/dl  - Hb goal >=8  - CBC daily   -- Heparin SC was restarted, as per discussion with GI,   - Maintain active T&S    DUSTIN - improving  Likely prerenal/hemodynamically related 2/2 hypotension in setting of GIB/ anemia / DKA  - Cr 3.25 on admission, 1.63 -- 02/08  - ---> 35  - Hall discontinued  , but reinserted after failed void trial   - Cont to monitor urine output      #HFrEF, CAD, troponinemia (1320--> 1192)  likely secondary to demand ischemia from hypotension and dustin   . Last documented LVEF in [employer] was 27%  - TTE 2/3:  unable to assess EF/LV function.  moderate mitral stenosis. Calcified trileaflet aortic valve with mildly decreased, Grossly the anterolateral and possibly the mid to distal segments of the anterior wall are hypokinetic. The right ventricle is not well visualized.  - Cardiology consulted, . Likely demand ischemia.    optimizes medical therapy , on aspirin 81mg   in addition was not candidate for anticoagulation or cath due to acute bleed and dustin     - No acute intervention per cardiology now     Hx COPD  - Restarted home Symbicort   - Saturating well on RA- 98 %     Abd pain  Likely 2/2 UGIB. POCUS showed cholelithiasis, CBD not dilated.   Abdominal ultrasound with biliary sludge and cholelithiasis. No acute shellie.    GERD  - on pantoprazole 40 IV BID__ changed it to 40 PO BID   - takes omeprazole at home    - DKA resolved,   HAGMA - resolved  Dm2    restarted Lantus 20 U qhs__ FS-- 84>109,   - On admelog 7u TID and ISS   - Continue to monitor BMP  - Carb consistent diet      Neutrophilic leukocytosis suspected due to  2/2 infection.  Procal 3.21 (2/3) , WBC 13.7 (2/3)-- peaked wbc 17k-> 12.38   - s/p IVFs  - cont empiric Zosyn ( started on 2/3 for 5 day course), last day 02/08  - MRSA screen positive (2/3) ,was given 1 dose of vanc on 2/3 and was started on mupirocin x5 day course.   - BCx 2/3- NGTD, to be finalize  - UA 2/3 large leuks, small ketones,  UCx 2/5- neg   blood and urine culture no growth     #VTE ppx: SCDs  ETHICS: Full code    plan:  Continue to trend CBC bid and signs of bleeding. Transfuse prn to maintain Hgb >8  gently iv hydration  continue Protonix 40mg bid   monitor fingerstick glucose, and continue insulin therapy   cont to monitor urine output and renal function for resolving DUSTIN. Indwelling hall catheter was discontinued on 02/07 and TOV unsuccessful , reinserted 2/8/2022   continue IV Zosyn for now  was recommended to discontinue on  02/08, would continue to complete 7 days course for unspecified possible infectious process.   CT abdomen and pelvis without Iv contrast due to dustin , in order  to rule out intraabdominal infection  F/U PT consult recs   re asess tomorrow

## 2022-02-09 LAB
ANION GAP SERPL CALC-SCNC: 11 MMOL/L — SIGNIFICANT CHANGE UP (ref 7–14)
BASOPHILS # BLD AUTO: 0.01 K/UL — SIGNIFICANT CHANGE UP (ref 0–0.2)
BASOPHILS NFR BLD AUTO: 0.1 % — SIGNIFICANT CHANGE UP (ref 0–2)
BUN SERPL-MCNC: 31 MG/DL — HIGH (ref 7–23)
CALCIUM SERPL-MCNC: 7.9 MG/DL — LOW (ref 8.4–10.5)
CHLORIDE SERPL-SCNC: 107 MMOL/L — SIGNIFICANT CHANGE UP (ref 98–107)
CO2 SERPL-SCNC: 20 MMOL/L — LOW (ref 22–31)
CREAT SERPL-MCNC: 1.62 MG/DL — HIGH (ref 0.5–1.3)
EOSINOPHIL # BLD AUTO: 0.16 K/UL — SIGNIFICANT CHANGE UP (ref 0–0.5)
EOSINOPHIL NFR BLD AUTO: 1.9 % — SIGNIFICANT CHANGE UP (ref 0–6)
GLUCOSE BLDC GLUCOMTR-MCNC: 108 MG/DL — HIGH (ref 70–99)
GLUCOSE BLDC GLUCOMTR-MCNC: 125 MG/DL — HIGH (ref 70–99)
GLUCOSE BLDC GLUCOMTR-MCNC: 133 MG/DL — HIGH (ref 70–99)
GLUCOSE BLDC GLUCOMTR-MCNC: 139 MG/DL — HIGH (ref 70–99)
GLUCOSE BLDC GLUCOMTR-MCNC: 151 MG/DL — HIGH (ref 70–99)
GLUCOSE BLDC GLUCOMTR-MCNC: 62 MG/DL — LOW (ref 70–99)
GLUCOSE BLDC GLUCOMTR-MCNC: 66 MG/DL — LOW (ref 70–99)
GLUCOSE BLDC GLUCOMTR-MCNC: 84 MG/DL — SIGNIFICANT CHANGE UP (ref 70–99)
GLUCOSE BLDC GLUCOMTR-MCNC: 92 MG/DL — SIGNIFICANT CHANGE UP (ref 70–99)
GLUCOSE SERPL-MCNC: 64 MG/DL — LOW (ref 70–99)
HCT VFR BLD CALC: 21.1 % — LOW (ref 39–50)
HCT VFR BLD CALC: 26.6 % — LOW (ref 39–50)
HCT VFR BLD CALC: 29 % — LOW (ref 39–50)
HGB BLD-MCNC: 7 G/DL — CRITICAL LOW (ref 13–17)
HGB BLD-MCNC: 8.6 G/DL — LOW (ref 13–17)
HGB BLD-MCNC: 9.3 G/DL — LOW (ref 13–17)
IANC: 5.38 K/UL — SIGNIFICANT CHANGE UP (ref 1.5–8.5)
IMM GRANULOCYTES NFR BLD AUTO: 0.7 % — SIGNIFICANT CHANGE UP (ref 0–1.5)
LYMPHOCYTES # BLD AUTO: 1.8 K/UL — SIGNIFICANT CHANGE UP (ref 1–3.3)
LYMPHOCYTES # BLD AUTO: 21.8 % — SIGNIFICANT CHANGE UP (ref 13–44)
MAGNESIUM SERPL-MCNC: 1.8 MG/DL — SIGNIFICANT CHANGE UP (ref 1.6–2.6)
MCHC RBC-ENTMCNC: 28.8 PG — SIGNIFICANT CHANGE UP (ref 27–34)
MCHC RBC-ENTMCNC: 29.3 PG — SIGNIFICANT CHANGE UP (ref 27–34)
MCHC RBC-ENTMCNC: 30.2 PG — SIGNIFICANT CHANGE UP (ref 27–34)
MCHC RBC-ENTMCNC: 32.1 GM/DL — SIGNIFICANT CHANGE UP (ref 32–36)
MCHC RBC-ENTMCNC: 32.3 GM/DL — SIGNIFICANT CHANGE UP (ref 32–36)
MCHC RBC-ENTMCNC: 33.2 GM/DL — SIGNIFICANT CHANGE UP (ref 32–36)
MCV RBC AUTO: 89 FL — SIGNIFICANT CHANGE UP (ref 80–100)
MCV RBC AUTO: 90.9 FL — SIGNIFICANT CHANGE UP (ref 80–100)
MCV RBC AUTO: 91.5 FL — SIGNIFICANT CHANGE UP (ref 80–100)
MONOCYTES # BLD AUTO: 0.83 K/UL — SIGNIFICANT CHANGE UP (ref 0–0.9)
MONOCYTES NFR BLD AUTO: 10.1 % — SIGNIFICANT CHANGE UP (ref 2–14)
NEUTROPHILS # BLD AUTO: 5.38 K/UL — SIGNIFICANT CHANGE UP (ref 1.8–7.4)
NEUTROPHILS NFR BLD AUTO: 65.4 % — SIGNIFICANT CHANGE UP (ref 43–77)
NRBC # BLD: 0 /100 WBCS — SIGNIFICANT CHANGE UP
NRBC # FLD: 0 K/UL — SIGNIFICANT CHANGE UP
PHOSPHATE SERPL-MCNC: 3.6 MG/DL — SIGNIFICANT CHANGE UP (ref 2.5–4.5)
PLATELET # BLD AUTO: 135 K/UL — LOW (ref 150–400)
PLATELET # BLD AUTO: 147 K/UL — LOW (ref 150–400)
PLATELET # BLD AUTO: 158 K/UL — SIGNIFICANT CHANGE UP (ref 150–400)
POTASSIUM SERPL-MCNC: 3.7 MMOL/L — SIGNIFICANT CHANGE UP (ref 3.5–5.3)
POTASSIUM SERPL-SCNC: 3.7 MMOL/L — SIGNIFICANT CHANGE UP (ref 3.5–5.3)
RBC # BLD: 2.32 M/UL — LOW (ref 4.2–5.8)
RBC # BLD: 2.99 M/UL — LOW (ref 4.2–5.8)
RBC # BLD: 3.17 M/UL — LOW (ref 4.2–5.8)
RBC # FLD: 17 % — HIGH (ref 10.3–14.5)
RBC # FLD: 17.2 % — HIGH (ref 10.3–14.5)
RBC # FLD: 17.3 % — HIGH (ref 10.3–14.5)
SARS-COV-2 RNA SPEC QL NAA+PROBE: SIGNIFICANT CHANGE UP
SODIUM SERPL-SCNC: 138 MMOL/L — SIGNIFICANT CHANGE UP (ref 135–145)
WBC # BLD: 11.75 K/UL — HIGH (ref 3.8–10.5)
WBC # BLD: 8.24 K/UL — SIGNIFICANT CHANGE UP (ref 3.8–10.5)
WBC # BLD: 9.86 K/UL — SIGNIFICANT CHANGE UP (ref 3.8–10.5)
WBC # FLD AUTO: 11.75 K/UL — HIGH (ref 3.8–10.5)
WBC # FLD AUTO: 8.24 K/UL — SIGNIFICANT CHANGE UP (ref 3.8–10.5)
WBC # FLD AUTO: 9.86 K/UL — SIGNIFICANT CHANGE UP (ref 3.8–10.5)

## 2022-02-09 PROCEDURE — 99232 SBSQ HOSP IP/OBS MODERATE 35: CPT

## 2022-02-09 RX ORDER — DEXTROSE 50 % IN WATER 50 %
25 SYRINGE (ML) INTRAVENOUS ONCE
Refills: 0 | Status: COMPLETED | OUTPATIENT
Start: 2022-02-09 | End: 2022-02-09

## 2022-02-09 RX ORDER — INSULIN GLARGINE 100 [IU]/ML
15 INJECTION, SOLUTION SUBCUTANEOUS AT BEDTIME
Refills: 0 | Status: DISCONTINUED | OUTPATIENT
Start: 2022-02-09 | End: 2022-02-10

## 2022-02-09 RX ORDER — SODIUM CHLORIDE 9 MG/ML
1000 INJECTION INTRAMUSCULAR; INTRAVENOUS; SUBCUTANEOUS
Refills: 0 | Status: DISCONTINUED | OUTPATIENT
Start: 2022-02-09 | End: 2022-02-11

## 2022-02-09 RX ADMIN — PANTOPRAZOLE SODIUM 40 MILLIGRAM(S): 20 TABLET, DELAYED RELEASE ORAL at 17:57

## 2022-02-09 RX ADMIN — TAMSULOSIN HYDROCHLORIDE 0.4 MILLIGRAM(S): 0.4 CAPSULE ORAL at 23:14

## 2022-02-09 RX ADMIN — Medication 25 GRAM(S): at 07:58

## 2022-02-09 RX ADMIN — ATORVASTATIN CALCIUM 40 MILLIGRAM(S): 80 TABLET, FILM COATED ORAL at 12:17

## 2022-02-09 RX ADMIN — MIDODRINE HYDROCHLORIDE 20 MILLIGRAM(S): 2.5 TABLET ORAL at 12:16

## 2022-02-09 RX ADMIN — POLYETHYLENE GLYCOL 3350 17 GRAM(S): 17 POWDER, FOR SOLUTION ORAL at 13:17

## 2022-02-09 RX ADMIN — HEPARIN SODIUM 5000 UNIT(S): 5000 INJECTION INTRAVENOUS; SUBCUTANEOUS at 07:00

## 2022-02-09 RX ADMIN — SENNA PLUS 2 TABLET(S): 8.6 TABLET ORAL at 23:15

## 2022-02-09 RX ADMIN — MUPIROCIN 1 APPLICATION(S): 20 OINTMENT TOPICAL at 17:57

## 2022-02-09 RX ADMIN — MUPIROCIN 1 APPLICATION(S): 20 OINTMENT TOPICAL at 07:00

## 2022-02-09 RX ADMIN — MIDODRINE HYDROCHLORIDE 20 MILLIGRAM(S): 2.5 TABLET ORAL at 01:21

## 2022-02-09 RX ADMIN — CITALOPRAM 10 MILLIGRAM(S): 10 TABLET, FILM COATED ORAL at 12:17

## 2022-02-09 RX ADMIN — MIDODRINE HYDROCHLORIDE 20 MILLIGRAM(S): 2.5 TABLET ORAL at 17:56

## 2022-02-09 RX ADMIN — CHLORHEXIDINE GLUCONATE 1 APPLICATION(S): 213 SOLUTION TOPICAL at 06:57

## 2022-02-09 RX ADMIN — PANTOPRAZOLE SODIUM 40 MILLIGRAM(S): 20 TABLET, DELAYED RELEASE ORAL at 07:00

## 2022-02-09 RX ADMIN — Medication 81 MILLIGRAM(S): at 12:17

## 2022-02-09 NOTE — PROGRESS NOTE ADULT - ASSESSMENT
76M with PMHx HFrEF (TTE 8/2011 showed moderate segmental LVSD), CAD s/p CABG, HTN, DM, MDD, hx of COPD, hx of CVA with hemiparesis 2011, GERD and GI bleed in the past.   Presented 2-2-2022  from [facility] with  due to coffee ground emesis, bp 70/40mmHg, AMS and  reported he had complained of  right lower quadrant sharp abd pain 1 week duration Admitted to the ICU due to shock  requiring levophed, anemic to Hb 5.6, with hyponatremia 128 , DUSTIN bun 165-3.25 , s/p 3u PRBC. Course complicated by worsening high AG acidosis and DKA, s/p insulin gtt. Pt now off pressors, on midodrine  transferred to medical  floors 2/7/2022    hemorrhagic Shock vs septic  - resolved   UGIB vs infection   - Off levophed   - s/p IVF resuscitation.  - s/p empiric zosyn and vanc by level  - Holding home carvedilol, ASA _81 mg daily, -- was restarted 2/7, as per discussion with GI   - Continue midodrine to 20 TID, monitor for bradycardia      acute upper GI bleed with  Coffee ground emesis DDX stress PUD from dustin     on admission.  Downtrending, 31 today  - Cont Pantoprazole 40mg BID   - Holding off on EGD per GI, as per has no active bleeding and is hemodynamically stable,   - Diet as tolerated__ discussed with GI -- 02/07__ advanced to mechanical soft on 02/07   - Cont Bowel regimen     acute blood loss Anemia   Likely 2/2 UGIB and dustin   - Hb 5.6 on admission, received 3U pRBCs on 2/2. Hb,   2-8-2022  hemoglobin 7.0g/dl  s/p 1 prbc    Hb today 9.3g/dl   - Hb goal >=8  - CBC daily  bid   -- Heparin SC was restarted, as per discussion with GI, held  after needing transfusion, i suspect hypovolemic and dilution after giving him IV fluids hemoglobin decreased , no acute bleeding noted  - Maintain active T&S    DUSTIN - improving  Likely prerenal/hemodynamically related 2/2 hypotension in setting of GIB/ anemia / DKA  - Cr 3.25 on admission,  now  Cr 1.62  - ---> 31  - Hall discontinued  , but reinserted after failed void trial   - Cont to monitor urine output      #HFrEF, CAD, troponinemia (1320--> 1192)  likely secondary to demand ischemia from hypotension and dustin   . Last documented LVEF in [employer] was 27%  - TTE 2/3:  unable to assess EF/LV function.  moderate mitral stenosis. Calcified trileaflet aortic valve with mildly decreased, Grossly the anterolateral and possibly the mid to distal segments of the anterior wall are hypokinetic. The right ventricle is not well visualized.  - Cardiology consulted, . Likely demand ischemia.    optimizes medical therapy , on aspirin 81mg   in addition was not candidate for anticoagulation or cath due to acute bleed and dustin     - No acute intervention per cardiology now     Hx COPD  - Restarted home Symbicort   - Saturating well on RA- 98 %     Abd pain  Likely 2/2 UGIB. POCUS showed cholelithiasis, CBD not dilated.   Abdominal ultrasound with biliary sludge and cholelithiasis. No acute shellie.    GERD  - on pantoprazole 40 IV BID__ changed it to 40 PO BID   - takes omeprazole at home    - DKA resolved,   HAGMA - resolved  Dm2    restarted Lantus 20 U qhs__ FS-- 84>109,   - On admelog 7u TID and ISS   - Continue to monitor BMP  - Carb consistent diet      Neutrophilic leukocytosis suspected due to  2/2 infection.  Procal 3.21 (2/3) , WBC 13.7 (2/3)-- peaked wbc 17k-> 12.38   - s/p IVFs  - cont empiric Zosyn ( started on 2/3 for 5 day course), last day 02/08  - MRSA screen positive (2/3) ,was given 1 dose of vanc on 2/3 and was started on mupirocin x5 day course.   - BCx 2/3- NGTD, to be finalize  - UA 2/3 large leuks, small ketones,  UCx 2/5- neg   blood and urine culture no growth     #VTE ppx: SCDs  ETHICS: Full code    plan:  Continue to trend CBC bid and signs of bleeding. Transfuse prn to maintain Hgb >8   iv hydration 70ml/hr, poor urine output and hypotension  continue Protonix 40mg bid   monitor fingerstick glucose, and continue insulin therapy   cont to monitor urine output and renal function for resolving DUSTIN. Indwelling hall catheter was discontinued on 02/07 and TOV unsuccessful , reinserted 2/8/2022   continue IV Zosyn for now  was recommended to discontinue on  02/08, would continue to complete 7 days course for unspecified possible infectious process.   CT abdomen and pelvis without Iv contrast due to dustin , in order  to rule out intraabdominal infection  F/U PT consult recs   re asess tomorrow  76M with PMHx HFrEF (TTE 8/2011 showed moderate segmental LVSD), CAD s/p CABG, HTN, DM, MDD, hx of COPD, hx of CVA with hemiparesis 2011, GERD and GI bleed in the past.   Presented 2-2-2022  from [facility] with  due to coffee ground emesis, bp 70/40mmHg, AMS and  reported he had complained of  right lower quadrant sharp abd pain 1 week duration Admitted to the ICU due to shock  requiring levophed, anemic to Hb 5.6, with hyponatremia 128 , DUSTIN bun 165-3.25 , s/p 3u PRBC. Course complicated by worsening high AG acidosis and DKA, s/p insulin gtt. Pt now off pressors, on midodrine  transferred to medical  floors 2/7/2022    hemorrhagic Shock vs septic  - resolved   UGIB vs infection   - Off levophed   - s/p IVF resuscitation.  - s/p empiric zosyn and vanc by level  - Holding home carvedilol, ASA _81 mg daily, -- was restarted 2/7, as per discussion with GI   - Continue midodrine to 20 TID, monitor for bradycardia      acute upper GI bleed with  Coffee ground emesis DDX stress PUD from dustin     on admission.  Downtrending, 31 today  - Cont Pantoprazole 40mg BID   - Holding off on EGD per GI, as per has no active bleeding and is hemodynamically stable,   - Diet as tolerated__ discussed with GI -- 02/07__ advanced to mechanical soft on 02/07   - Cont Bowel regimen     acute blood loss Anemia   Likely 2/2 UGIB and dustin   - Hb 5.6 on admission, received 3U pRBCs on 2/2. Hb,   2-8-2022  hemoglobin 7.0g/dl  s/p 1 prbc    Hb today 9.3g/dl   - Hb goal >=8  - CBC daily  bid   -- Heparin SC was restarted, as per discussion with GI, held  after needing transfusion, i suspect hypovolemic and dilution after giving him IV fluids hemoglobin decreased , no acute bleeding noted  - Maintain active T&S    DUSTIN - improving  Likely prerenal/hemodynamically related 2/2 hypotension in setting of GIB/ anemia / DKA  - Cr 3.25 on admission,  now  Cr 1.62  - ---> 31  - Hall discontinued  , but reinserted after failed void trial   - Cont to monitor urine output      #HFrEF, CAD, troponinemia (1320--> 1192)  likely secondary to demand ischemia from hypotension and dustin   . Last documented LVEF in [employer] was 27%  - TTE 2/3:  unable to assess EF/LV function.  moderate mitral stenosis. Calcified trileaflet aortic valve with mildly decreased, Grossly the anterolateral and possibly the mid to distal segments of the anterior wall are hypokinetic. The right ventricle is not well visualized.  - Cardiology consulted, . Likely demand ischemia.    optimizes medical therapy , on aspirin 81mg   in addition was not candidate for anticoagulation or cath due to acute bleed and dustin     - No acute intervention per cardiology now     Hx COPD  - Restarted home Symbicort   - Saturating well on RA- 98 %     Abd pain  Likely 2/2 UGIB. POCUS showed cholelithiasis, CBD not dilated.   Abdominal ultrasound with biliary sludge and cholelithiasis. No acute shellie.    GERD  - on pantoprazole 40 IV BID__ changed it to 40 PO BID   - takes omeprazole at home    - DKA resolved,   HAGMA - resolved  Dm2 , asymptmatic hypoglycemia in am   decreased lantus 15 units qhs   - ISS   - Continue to monitor BMP  - Carb consistent diet      Neutrophilic leukocytosis suspected due to  2/2 infection.  Procal 3.21 (2/3) , WBC 13.7 (2/3)-- peaked wbc 17k-> 12.38   - s/p IVFs  - cont empiric Zosyn ( started on 2/3 for 5 day course), last day 02/08  - MRSA screen positive (2/3) ,was given 1 dose of vanc on 2/3 and was started on mupirocin x5 day course.   - BCx 2/3- NGTD, to be finalize  - UA 2/3 large leuks, small ketones,  UCx 2/5- neg   blood and urine culture no growth     #VTE ppx: SCDs  ETHICS: Full code    plan:  Continue to trend CBC bid and signs of bleeding. Transfuse prn to maintain Hgb >8   iv hydration 70ml/hr, poor urine output and hypotension  continue Protonix 40mg bid   monitor fingerstick glucose, and continue insulin therapy   cont to monitor urine output and renal function for resolving DUSTIN. Indwelling hall catheter was discontinued on 02/07 and TOV unsuccessful , reinserted 2/8/2022   continue IV Zosyn for now  was recommended to discontinue on  02/08, would continue to complete 7 days course for unspecified possible infectious process.   CT abdomen and pelvis without Iv contrast due to dustin , in order  to rule out intraabdominal infection  F/U PT consult recs   re asess tomorrow

## 2022-02-09 NOTE — PROGRESS NOTE ADULT - SUBJECTIVE AND OBJECTIVE BOX
PROGRESS NOTE:     Patient is a 76y old  Male who presents with a chief complaint of Abdominal pain (08 Feb 2022 15:33)      SUBJECTIVE / OVERNIGHT EVENTS:  decrease hemoglobin and transfused 1 PRBC   denies pain or nausea     ADDITIONAL REVIEW OF SYSTEMS:  denies fever     MEDICATIONS  (STANDING):  aspirin enteric coated 81 milliGRAM(s) Oral daily  atorvastatin 40 milliGRAM(s) Oral daily  budesonide  80 MICROgram(s)/formoterol 4.5 MICROgram(s) Inhaler 2 Puff(s) Inhalation two times a day  chlorhexidine 4% Liquid 1 Application(s) Topical <User Schedule>  citalopram 10 milliGRAM(s) Oral daily  dextrose 40% Gel 15 Gram(s) Oral once  dextrose 40% Gel 15 Gram(s) Oral once  dextrose 5%. 1000 milliLiter(s) (50 mL/Hr) IV Continuous <Continuous>  dextrose 50% Injectable 25 Gram(s) IV Push once  dextrose 50% Injectable 12.5 Gram(s) IV Push once  dextrose 50% Injectable 25 Gram(s) IV Push once  dextrose 50% Injectable 25 Gram(s) IV Push once  dextrose 50% Injectable 12.5 Gram(s) IV Push once  glucagon  Injectable 1 milliGRAM(s) IntraMuscular once  insulin glargine Injectable (LANTUS) 15 Unit(s) SubCutaneous at bedtime  insulin lispro (ADMELOG) corrective regimen sliding scale   SubCutaneous three times a day before meals  insulin lispro (ADMELOG) corrective regimen sliding scale   SubCutaneous at bedtime  midodrine 20 milliGRAM(s) Oral every 8 hours  mupirocin 2% Ointment 1 Application(s) Topical two times a day  pantoprazole    Tablet 40 milliGRAM(s) Oral two times a day  polyethylene glycol 3350 17 Gram(s) Oral daily  senna 2 Tablet(s) Oral at bedtime  sodium chloride 0.9%. 1000 milliLiter(s) (70 mL/Hr) IV Continuous <Continuous>  tamsulosin 0.4 milliGRAM(s) Oral at bedtime    MEDICATIONS  (PRN):      CAPILLARY BLOOD GLUCOSE      POCT Blood Glucose.: 108 mg/dL (09 Feb 2022 17:49)  POCT Blood Glucose.: 133 mg/dL (09 Feb 2022 11:58)  POCT Blood Glucose.: 139 mg/dL (09 Feb 2022 11:42)  POCT Blood Glucose.: 151 mg/dL (09 Feb 2022 09:58)  POCT Blood Glucose.: 125 mg/dL (09 Feb 2022 08:02)  POCT Blood Glucose.: 62 mg/dL (09 Feb 2022 07:28)  POCT Blood Glucose.: 66 mg/dL (09 Feb 2022 07:27)  POCT Blood Glucose.: 145 mg/dL (08 Feb 2022 21:31)    I&O's Summary    08 Feb 2022 07:01  -  09 Feb 2022 07:00  --------------------------------------------------------  IN: 546 mL / OUT: 1775 mL / NET: -1229 mL    09 Feb 2022 07:01  -  09 Feb 2022 19:56  --------------------------------------------------------  IN: 42 mL / OUT: 690 mL / NET: -648 mL        PHYSICAL EXAM:  Vital Signs Last 24 Hrs  T(C): 36.4 (09 Feb 2022 16:00), Max: 36.8 (08 Feb 2022 20:00)  T(F): 97.6 (09 Feb 2022 16:00), Max: 98.2 (08 Feb 2022 20:00)  HR: 76 (09 Feb 2022 16:00) (60 - 80)  BP: 119/45 (09 Feb 2022 16:00) (90/50 - 119/45)  BP(mean): 63 (09 Feb 2022 16:00) (50 - 77)  RR: 20 (09 Feb 2022 16:00) (13 - 23)  SpO2: 99% (09 Feb 2022 16:00) (98% - 100%)    CONSTITUTIONAL: NAD, well-developed  awake, alert oriented to self   lungs ctab   abdomen soft nontender      LABS:                        9.3    11.75 )-----------( 158      ( 09 Feb 2022 19:14 )             29.0     02-09    138  |  107  |  31<H>  ----------------------------<  64<L>  3.7   |  20<L>  |  1.62<H>    Ca    7.9<L>      09 Feb 2022 03:59  Phos  3.6     02-09  Mg     1.80     02-09        RADIOLOGY & ADDITIONAL TESTS:  Results Reviewed: y  Imaging Personally Reviewed:y  Electrocardiogram Personally Reviewed:n    COORDINATION OF CARE:  Care Discussed with Consultants/Other Providers [Y/N]:y  Prior or Outpatient Records Reviewed [Y/N]:n

## 2022-02-10 LAB
ANION GAP SERPL CALC-SCNC: 10 MMOL/L — SIGNIFICANT CHANGE UP (ref 7–14)
BUN SERPL-MCNC: 23 MG/DL — SIGNIFICANT CHANGE UP (ref 7–23)
CALCIUM SERPL-MCNC: 8 MG/DL — LOW (ref 8.4–10.5)
CHLORIDE SERPL-SCNC: 108 MMOL/L — HIGH (ref 98–107)
CO2 SERPL-SCNC: 19 MMOL/L — LOW (ref 22–31)
CREAT SERPL-MCNC: 1.33 MG/DL — HIGH (ref 0.5–1.3)
GLUCOSE BLDC GLUCOMTR-MCNC: 113 MG/DL — HIGH (ref 70–99)
GLUCOSE BLDC GLUCOMTR-MCNC: 117 MG/DL — HIGH (ref 70–99)
GLUCOSE BLDC GLUCOMTR-MCNC: 73 MG/DL — SIGNIFICANT CHANGE UP (ref 70–99)
GLUCOSE BLDC GLUCOMTR-MCNC: 77 MG/DL — SIGNIFICANT CHANGE UP (ref 70–99)
GLUCOSE BLDC GLUCOMTR-MCNC: 81 MG/DL — SIGNIFICANT CHANGE UP (ref 70–99)
GLUCOSE BLDC GLUCOMTR-MCNC: 87 MG/DL — SIGNIFICANT CHANGE UP (ref 70–99)
GLUCOSE SERPL-MCNC: 70 MG/DL — SIGNIFICANT CHANGE UP (ref 70–99)
HCT VFR BLD CALC: 25.9 % — LOW (ref 39–50)
HGB BLD-MCNC: 8.7 G/DL — LOW (ref 13–17)
MAGNESIUM SERPL-MCNC: 1.7 MG/DL — SIGNIFICANT CHANGE UP (ref 1.6–2.6)
MCHC RBC-ENTMCNC: 30 PG — SIGNIFICANT CHANGE UP (ref 27–34)
MCHC RBC-ENTMCNC: 33.6 GM/DL — SIGNIFICANT CHANGE UP (ref 32–36)
MCV RBC AUTO: 89.3 FL — SIGNIFICANT CHANGE UP (ref 80–100)
NRBC # BLD: 0 /100 WBCS — SIGNIFICANT CHANGE UP
NRBC # FLD: 0 K/UL — SIGNIFICANT CHANGE UP
PHOSPHATE SERPL-MCNC: 3 MG/DL — SIGNIFICANT CHANGE UP (ref 2.5–4.5)
PLATELET # BLD AUTO: 154 K/UL — SIGNIFICANT CHANGE UP (ref 150–400)
POTASSIUM SERPL-MCNC: 3.6 MMOL/L — SIGNIFICANT CHANGE UP (ref 3.5–5.3)
POTASSIUM SERPL-SCNC: 3.6 MMOL/L — SIGNIFICANT CHANGE UP (ref 3.5–5.3)
RBC # BLD: 2.9 M/UL — LOW (ref 4.2–5.8)
RBC # FLD: 17.2 % — HIGH (ref 10.3–14.5)
SODIUM SERPL-SCNC: 137 MMOL/L — SIGNIFICANT CHANGE UP (ref 135–145)
WBC # BLD: 8.94 K/UL — SIGNIFICANT CHANGE UP (ref 3.8–10.5)
WBC # FLD AUTO: 8.94 K/UL — SIGNIFICANT CHANGE UP (ref 3.8–10.5)

## 2022-02-10 PROCEDURE — 99232 SBSQ HOSP IP/OBS MODERATE 35: CPT

## 2022-02-10 PROCEDURE — 74176 CT ABD & PELVIS W/O CONTRAST: CPT | Mod: 26

## 2022-02-10 RX ORDER — INSULIN GLARGINE 100 [IU]/ML
8 INJECTION, SOLUTION SUBCUTANEOUS AT BEDTIME
Refills: 0 | Status: DISCONTINUED | OUTPATIENT
Start: 2022-02-10 | End: 2022-02-11

## 2022-02-10 RX ORDER — MIDODRINE HYDROCHLORIDE 2.5 MG/1
15 TABLET ORAL EVERY 8 HOURS
Refills: 0 | Status: DISCONTINUED | OUTPATIENT
Start: 2022-02-10 | End: 2022-02-11

## 2022-02-10 RX ADMIN — BUDESONIDE AND FORMOTEROL FUMARATE DIHYDRATE 2 PUFF(S): 160; 4.5 AEROSOL RESPIRATORY (INHALATION) at 04:56

## 2022-02-10 RX ADMIN — PANTOPRAZOLE SODIUM 40 MILLIGRAM(S): 20 TABLET, DELAYED RELEASE ORAL at 05:01

## 2022-02-10 RX ADMIN — TAMSULOSIN HYDROCHLORIDE 0.4 MILLIGRAM(S): 0.4 CAPSULE ORAL at 21:54

## 2022-02-10 RX ADMIN — BUDESONIDE AND FORMOTEROL FUMARATE DIHYDRATE 2 PUFF(S): 160; 4.5 AEROSOL RESPIRATORY (INHALATION) at 08:21

## 2022-02-10 RX ADMIN — MIDODRINE HYDROCHLORIDE 20 MILLIGRAM(S): 2.5 TABLET ORAL at 01:12

## 2022-02-10 RX ADMIN — POLYETHYLENE GLYCOL 3350 17 GRAM(S): 17 POWDER, FOR SOLUTION ORAL at 13:43

## 2022-02-10 RX ADMIN — MUPIROCIN 1 APPLICATION(S): 20 OINTMENT TOPICAL at 05:01

## 2022-02-10 RX ADMIN — SENNA PLUS 2 TABLET(S): 8.6 TABLET ORAL at 21:54

## 2022-02-10 RX ADMIN — MUPIROCIN 1 APPLICATION(S): 20 OINTMENT TOPICAL at 17:54

## 2022-02-10 RX ADMIN — MIDODRINE HYDROCHLORIDE 15 MILLIGRAM(S): 2.5 TABLET ORAL at 21:54

## 2022-02-10 RX ADMIN — PANTOPRAZOLE SODIUM 40 MILLIGRAM(S): 20 TABLET, DELAYED RELEASE ORAL at 17:54

## 2022-02-10 RX ADMIN — SODIUM CHLORIDE 70 MILLILITER(S): 9 INJECTION INTRAMUSCULAR; INTRAVENOUS; SUBCUTANEOUS at 01:48

## 2022-02-10 RX ADMIN — CHLORHEXIDINE GLUCONATE 1 APPLICATION(S): 213 SOLUTION TOPICAL at 07:00

## 2022-02-10 RX ADMIN — ATORVASTATIN CALCIUM 40 MILLIGRAM(S): 80 TABLET, FILM COATED ORAL at 13:43

## 2022-02-10 RX ADMIN — CITALOPRAM 10 MILLIGRAM(S): 10 TABLET, FILM COATED ORAL at 13:43

## 2022-02-10 RX ADMIN — MIDODRINE HYDROCHLORIDE 20 MILLIGRAM(S): 2.5 TABLET ORAL at 10:30

## 2022-02-10 NOTE — PROGRESS NOTE ADULT - SUBJECTIVE AND OBJECTIVE BOX
PROGRESS NOTE:     Patient is a 76y old  Male who presents with a chief complaint of Abdominal pain (09 Feb 2022 19:56)      SUBJECTIVE / OVERNIGHT EVENTS:  he denies pain or bleeding     ADDITIONAL REVIEW OF SYSTEMS:  denies nausea     MEDICATIONS  (STANDING):  aspirin enteric coated 81 milliGRAM(s) Oral daily  atorvastatin 40 milliGRAM(s) Oral daily  budesonide  80 MICROgram(s)/formoterol 4.5 MICROgram(s) Inhaler 2 Puff(s) Inhalation two times a day  chlorhexidine 4% Liquid 1 Application(s) Topical <User Schedule>  citalopram 10 milliGRAM(s) Oral daily  dextrose 40% Gel 15 Gram(s) Oral once  dextrose 40% Gel 15 Gram(s) Oral once  dextrose 5%. 1000 milliLiter(s) (50 mL/Hr) IV Continuous <Continuous>  dextrose 50% Injectable 25 Gram(s) IV Push once  dextrose 50% Injectable 12.5 Gram(s) IV Push once  dextrose 50% Injectable 25 Gram(s) IV Push once  dextrose 50% Injectable 25 Gram(s) IV Push once  dextrose 50% Injectable 12.5 Gram(s) IV Push once  glucagon  Injectable 1 milliGRAM(s) IntraMuscular once  insulin glargine Injectable (LANTUS) 8 Unit(s) SubCutaneous at bedtime  insulin lispro (ADMELOG) corrective regimen sliding scale   SubCutaneous three times a day before meals  insulin lispro (ADMELOG) corrective regimen sliding scale   SubCutaneous at bedtime  midodrine 15 milliGRAM(s) Oral every 8 hours  mupirocin 2% Ointment 1 Application(s) Topical two times a day  pantoprazole    Tablet 40 milliGRAM(s) Oral two times a day  polyethylene glycol 3350 17 Gram(s) Oral daily  senna 2 Tablet(s) Oral at bedtime  sodium chloride 0.9%. 1000 milliLiter(s) (70 mL/Hr) IV Continuous <Continuous>  tamsulosin 0.4 milliGRAM(s) Oral at bedtime    MEDICATIONS  (PRN):      CAPILLARY BLOOD GLUCOSE      POCT Blood Glucose.: 113 mg/dL (10 Feb 2022 12:52)  POCT Blood Glucose.: 77 mg/dL (10 Feb 2022 08:44)  POCT Blood Glucose.: 81 mg/dL (10 Feb 2022 07:09)  POCT Blood Glucose.: 73 mg/dL (10 Feb 2022 02:51)  POCT Blood Glucose.: 84 mg/dL (09 Feb 2022 23:13)  POCT Blood Glucose.: 92 mg/dL (09 Feb 2022 21:33)  POCT Blood Glucose.: 108 mg/dL (09 Feb 2022 17:49)    I&O's Summary    09 Feb 2022 07:01  -  10 Feb 2022 07:00  --------------------------------------------------------  IN: 672 mL / OUT: 1140 mL / NET: -468 mL        PHYSICAL EXAM:  Vital Signs Last 24 Hrs  T(C): 36.8 (10 Feb 2022 15:58), Max: 36.8 (10 Feb 2022 15:58)  T(F): 98.3 (10 Feb 2022 15:58), Max: 98.3 (10 Feb 2022 15:58)  HR: 81 (10 Feb 2022 15:58) (68 - 81)  BP: 142/65 (10 Feb 2022 15:58) (106/43 - 142/65)  BP(mean): 77 (10 Feb 2022 08:00) (65 - 77)  RR: 20 (10 Feb 2022 15:58) (16 - 24)  SpO2: 100% (10 Feb 2022 15:58) (94% - 100%)    CONSTITUTIONAL: NAD, well-developed  awake, alert oriented to self   lungs ctab   abdomen soft nontender  hall catheter  present       LABS:                        8.7    8.94  )-----------( 154      ( 10 Feb 2022 03:11 )             25.9     02-10    137  |  108<H>  |  23  ----------------------------<  70  3.6   |  19<L>  |  1.33<H>    Ca    8.0<L>      10 Feb 2022 03:11  Phos  3.0     02-10  Mg     1.70     02-10        RADIOLOGY & ADDITIONAL TESTS:  Results Reviewed: y  Imaging Personally Reviewed:y  Electrocardiogram Personally Reviewed:n    COORDINATION OF CARE:  Care Discussed with Consultants/Other Providers [Y/N]:y  Prior or Outpatient Records Reviewed [Y/N]:n

## 2022-02-10 NOTE — PROGRESS NOTE ADULT - ASSESSMENT
76M with PMHx HFrEF (TTE 8/2011 showed moderate segmental LVSD), CAD s/p CABG, HTN, DM, MDD, hx of COPD, hx of CVA with hemiparesis 2011, GERD and GI bleed in the past.   Presented 2-2-2022  from [facility] with  due to coffee ground emesis, bp 70/40mmHg, AMS and  reported he had complained of  right lower quadrant sharp abd pain 1 week duration Admitted to the ICU due to shock  requiring levophed, anemic to Hb 5.6, with hyponatremia 128 , DUSTIN bun 165-3.25 , s/p 3u PRBC. Course complicated by worsening high AG acidosis and DKA, s/p insulin gtt. Pt now off pressors, on midodrine  transferred to medical  floors 2/7/2022    hemorrhagic Shock vs septic  - resolved   UGIB vs infection   - Off levophed   - s/p IVF resuscitation.  - s/p empiric zosyn and vanc by level  - Holding home carvedilol, ASA _81 mg daily, -- was restarted 2/7, as per discussion with GI   - taper  midodrine to 15mg TID, monitor for bradycardia , may decrease to 10mg, or even stop if bp improves and htn arises.     acute upper GI bleed with  Coffee ground emesis DDX stress PUD from dustin     on admission.  Downtrending, 23 today  - Cont Pantoprazole 40mg BID   - Holding off on EGD per GI, as per has no active bleeding and is hemodynamically stable,   - Diet as tolerated__ discussed with GI -- 02/07__ advanced to mechanical soft on 02/07   - Cont Bowel regimen     acute blood loss Anemia   Likely 2/2 UGIB and dustin   - Hb 5.6 on admission, received 3U pRBCs on 2/2. Hb,   2-8-2022  hemoglobin 7.0g/dl  s/p 1 prbc    Hb today 8.7g/dl   - Hb goal >=8  - CBC daily  bid   -- Heparin SC was restarted, as per discussion with GI, held  after needing transfusion, i suspect hypovolemic and dilution after giving him IV fluids hemoglobin decreased , no acute bleeding noted  - Maintain active T&S    DUSTIN - improving  Likely prerenal/hemodynamically related 2/2 hypotension in setting of GIB/ anemia / DKA  - Cr 3.25 on admission,  now  Cr 1.33  - ---> 23  - Hall discontinued  , but reinserted after failed void trial   - Cont to monitor urine output      #HFrEF, CAD, troponinemia (1320--> 1192)  likely secondary to demand ischemia from hypotension and dustin   . Last documented LVEF in [employer] was 27%  - TTE 2/3:  unable to assess EF/LV function.  moderate mitral stenosis. Calcified trileaflet aortic valve with mildly decreased, Grossly the anterolateral and possibly the mid to distal segments of the anterior wall are hypokinetic. The right ventricle is not well visualized.  - Cardiology consulted, . Likely demand ischemia.    optimizes medical therapy , on aspirin 81mg   in addition was not candidate for anticoagulation or cath due to acute bleed and dustin     - No acute intervention per cardiology now     Hx COPD  - Restarted home Symbicort   - Saturating well on RA- 98 %     Abd pain  Likely 2/2 UGIB. POCUS showed cholelithiasis, CBD not dilated.   Abdominal ultrasound with biliary sludge and cholelithiasis. No acute shellie.    GERD  - on pantoprazole 40 IV BID__ changed it to 40 PO BID   - takes omeprazole at home    - DKA resolved,   HAGMA - resolved  Dm2 , asymptmatic hypoglycemia in am   decreased lantus 15 units qhs   - ISS   - Continue to monitor BMP  - Carb consistent diet      Neutrophilic leukocytosis suspected due to  2/2 infection.  Procal 3.21 (2/3) , WBC 13.7 (2/3)-- peaked wbc 17k-> 12.38   - s/p IVFs  - completed  empiric Zosyn ( started on 2/3 for 5 day course), last day 02/08 , possible had acute Cystitis  - MRSA screen positive (2/3) ,was given 1 dose of vanc on 2/3 and was started on mupirocin x5 day course.   - BCx 2/3- NGTD, to be finalize  - UA 2/3 large leuks, small ketones,  UCx 2/5- neg   blood and urine culture no growth     #VTE ppx: SCDs  ETHICS: Full code    CT abdomen and pelvis without Iv contrast reviewed.   IMPRESSION:  Limited noncontrast exam.  Hall catheter. 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  small Bilateral pleural effusions.no diverticulitis or intraabdominal infection.    plan:  taper midodrine   Continue to trend CBC bid and signs of bleeding. Transfuse prn to maintain Hgb >8  continue iv hydration 70ml/hr may discontinue in the evening , once  oral intake and diet he is on thickened dysphagia, does not drink much thickened liquids.   continue Protonix 40mg bid   monitor fingerstick glucose, and continue insulin therapy   cont to monitor urine output and renal function for resolving DUSTIN. Indwelling hall catheter was discontinued on 02/07 and TOV unsuccessful , reinserted 2/8/2022  F/U PT consult recs and discharge planning in next 24-48hrs   76M with PMHx HFrEF (TTE 8/2011 showed moderate segmental LVSD), CAD s/p CABG, HTN, DM, MDD, hx of COPD, hx of CVA with hemiparesis 2011, GERD and GI bleed in the past.   Presented 2-2-2022  from [facility] with  due to coffee ground emesis, bp 70/40mmHg, AMS and  reported he had complained of  right lower quadrant sharp abd pain 1 week duration Admitted to the ICU due to shock  requiring levophed, anemic to Hb 5.6, with hyponatremia 128 , DUSTIN bun 165-3.25 , s/p 3u PRBC. Course complicated by worsening high AG acidosis and DKA, s/p insulin gtt. Pt now off pressors, on midodrine  transferred to medical  floors 2/7/2022    hemorrhagic Shock vs septic  - resolved   UGIB vs infection   - Off levophed   - s/p IVF resuscitation.  - s/p empiric zosyn and vanc by level  - Holding home carvedilol, ASA _81 mg daily, -- was restarted 2/7, as per discussion with GI   - taper  midodrine to 15mg TID, monitor for bradycardia , may decrease to 10mg, or even stop if bp improves and htn arises.     acute upper GI bleed with  Coffee ground emesis DDX stress PUD from dustin     on admission.  Downtrending, 23 today  - Cont Pantoprazole 40mg BID   - Holding off on EGD per GI, as per has no active bleeding and is hemodynamically stable,   - Diet as tolerated__ discussed with GI -- 02/07__ advanced to mechanical soft on 02/07   - Cont Bowel regimen     acute blood loss Anemia   Likely 2/2 UGIB and dustin   - Hb 5.6 on admission, received 3U pRBCs on 2/2. Hb,   2-8-2022  hemoglobin 7.0g/dl  s/p 1 prbc    Hb today 8.7g/dl   - Hb goal >=8  - CBC daily  bid   -- Heparin SC was restarted, as per discussion with GI, held  after needing transfusion, i suspect hypovolemic and dilution after giving him IV fluids hemoglobin decreased , no acute bleeding noted  - Maintain active T&S    DUSTIN - improving  Likely prerenal/hemodynamically related 2/2 hypotension in setting of GIB/ anemia / DKA  - Cr 3.25 on admission,  now  Cr 1.33  - ---> 23  - Hall discontinued  , but reinserted after failed void trial   - Cont to monitor urine output      #HFrEF, CAD, troponinemia (1320--> 1192)  likely secondary to demand ischemia from hypotension and dustin   . Last documented LVEF in [employer] was 27%  - TTE 2/3:  unable to assess EF/LV function.  moderate mitral stenosis. Calcified trileaflet aortic valve with mildly decreased, Grossly the anterolateral and possibly the mid to distal segments of the anterior wall are hypokinetic. The right ventricle is not well visualized.  - Cardiology consulted, . Likely demand ischemia.    optimizes medical therapy , on aspirin 81mg   in addition was not candidate for anticoagulation or cath due to acute bleed and dustin     - No acute intervention per cardiology now     Hx COPD  - Restarted home Symbicort   - Saturating well on RA- 98 %     Abd pain  Likely 2/2 UGIB. POCUS showed cholelithiasis, CBD not dilated.   Abdominal ultrasound with biliary sludge and cholelithiasis. No acute shellie.    GERD  - on pantoprazole 40 IV BID__ changed it to 40 PO BID   - takes omeprazole at home    - DKA resolved,   HAGMA - resolved  Dm2    asymptomatic hypoglycemia in mornings for second day despite reduction of insulin    decreased lantus from 20 unitsqhs to 15 units, decrease further to  8 units QHS    - ISS   - Continue to monitor BMP  - Carb consistent diet      Neutrophilic leukocytosis suspected due to  2/2 infection.  Procal 3.21 (2/3) , WBC 13.7 (2/3)-- peaked wbc 17k-> 12.38   - s/p IVFs  - completed  empiric Zosyn ( started on 2/3 for 5 day course), last day 02/08 , possible had acute Cystitis  - MRSA screen positive (2/3) ,was given 1 dose of vanc on 2/3 and was started on mupirocin x5 day course.   - BCx 2/3- NGTD, to be finalize  - UA 2/3 large leuks, small ketones,  UCx 2/5- neg   blood and urine culture no growth     #VTE ppx: SCDs  ETHICS: Full code    CT abdomen and pelvis without Iv contrast reviewed.   IMPRESSION:  Limited noncontrast exam.  Ahll catheter. 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  small Bilateral pleural effusions.no diverticulitis or intraabdominal infection.    plan:  decrease lantus to 8 units qhs  taper midodrine   Continue to trend CBC bid and signs of bleeding. Transfuse prn to maintain Hgb >8  continue iv hydration 70ml/hr may discontinue in the evening , once  oral intake and diet he is on thickened dysphagia, does not drink much thickened liquids.   continue Protonix 40mg bid   monitor fingerstick glucose, and continue insulin therapy   cont to monitor urine output and renal function for resolving DUSTIN. Indwelling hall catheter was discontinued on 02/07 and TOV unsuccessful , reinserted 2/8/2022  F/U PT consult recs and discharge planning in next 24-48hrs   76M with PMHx HFrEF (TTE 8/2011 showed moderate segmental LVSD), CAD s/p CABG, HTN, DM, MDD, hx of COPD, hx of CVA with hemiparesis 2011, GERD and GI bleed in the past.   Presented 2-2-2022  from [facility] with  due to coffee ground emesis, bp 70/40mmHg, AMS and  reported he had complained of  right lower quadrant sharp abd pain 1 week duration Admitted to the ICU due to shock  requiring levophed, anemic to Hb 5.6, with hyponatremia 128 , DUSTIN bun 165-3.25 , s/p 3u PRBC. Course complicated by worsening high AG acidosis and DKA, s/p insulin gtt. Pt now off pressors, on midodrine  transferred to medical  floors 2/7/2022    hemorrhagic Shock vs septic  - resolved   UGIB vs infection   - Off levophed   - s/p IVF resuscitation.  - s/p empiric zosyn and vanc by level  - Holding home carvedilol, ASA _81 mg daily, -- was restarted 2/7, as per discussion with GI   - taper  midodrine to 15mg TID, monitor for bradycardia , may decrease to 10mg, or even stop if bp improves and htn arises.     acute upper GI bleed with  Coffee ground emesis DDX stress PUD from dustin     on admission.  Downtrending, 23 today  - Cont Pantoprazole 40mg BID   - Holding off on EGD per GI, as per has no active bleeding and is hemodynamically stable,   - Diet as tolerated__ discussed with GI -- 02/07__ advanced to mechanical soft on 02/07   - Cont Bowel regimen     acute blood loss Anemia   Likely 2/2 UGIB and dustin   - Hb 5.6 on admission, received 3U pRBCs on 2/2. Hb,   2-8-2022  hemoglobin 7.0g/dl  s/p 1 prbc    Hb today 8.7g/dl   - Hb goal >=8  - CBC daily  bid   -- Heparin SC was restarted, as per discussion with GI, held  after needing transfusion, i suspect hypovolemic and dilution after giving him IV fluids hemoglobin decreased , no acute bleeding noted  - Maintain active T&S    DUSTIN - improving  Likely prerenal/hemodynamically related 2/2 hypotension in setting of GIB/ anemia / DKA  - Cr 3.25 on admission,  now  Cr 1.33  - ---> 23  - Hall discontinued  , but reinserted after failed void trial   - Cont to monitor urine output      #HFrEF, CAD, troponinemia (1320--> 1192)  likely secondary to demand ischemia from hypotension and dustin   . Last documented LVEF in [employer] was 27%  - TTE 2/3:  unable to assess EF/LV function.  moderate mitral stenosis. Calcified trileaflet aortic valve with mildly decreased, Grossly the anterolateral and possibly the mid to distal segments of the anterior wall are hypokinetic. The right ventricle is not well visualized.  - Cardiology consulted, . Likely demand ischemia.    optimizes medical therapy , on aspirin 81mg   in addition was not candidate for anticoagulation or cath due to acute bleed and dustin     - No acute intervention per cardiology now     Hx COPD  - Restarted home Symbicort   - Saturating well on RA- 98 %     Abd pain  Likely 2/2 UGIB. POCUS showed cholelithiasis, CBD not dilated.   Abdominal ultrasound with biliary sludge and cholelithiasis. No acute shellie.    GERD  - on pantoprazole 40 IV BID__ changed it to 40 PO BID   - takes omeprazole at home    - DKA resolved,   HAGMA - resolved  Dm2    asymptomatic hypoglycemia in mornings for second day despite reduction of insulin    decreased lantus from 20 unitsqhs to 15 units, decrease further to  8 units QHS    - ISS   - Continue to monitor BMP  - Carb consistent diet      Neutrophilic leukocytosis suspected due to  2/2 infection.  Procal 3.21 (2/3) , WBC 13.7 (2/3)-- peaked wbc 17k-> 12.38   - s/p IVFs  - completed  empiric Zosyn ( started on 2/3 for 5 day course), last day 02/08 , possible had acute Cystitis  - MRSA screen positive (2/3) ,was given 1 dose of vanc on 2/3 and was started on mupirocin x5 day course.   - BCx 2/3- NGTD, to be finalize  - UA 2/3 large leuks, small ketones,  UCx 2/5- neg   blood and urine culture no growth     #VTE ppx: SCDs  ETHICS: Full code    CT abdomen and pelvis without Iv contrast reviewed.   IMPRESSION:  Limited noncontrast exam.  Hall catheter. 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  small Bilateral pleural effusions.no diverticulitis or intraabdominal infection.    plan:  decrease lantus to 8 units qhs  taper midodrine   Continue to trend CBC daily , monitor for  signs of bleeding. Transfuse prn to maintain Hgb >8  continue iv hydration 70ml/hr may discontinue in the evening , once  oral intake and diet he is on thickened dysphagia, does not drink much thickened liquids.   continue Protonix 40mg bid   monitor fingerstick glucose, and continue insulin therapy   cont to monitor urine output and renal function for resolving DUSTIN. Indwelling hall catheter was discontinued on 02/07 and TOV unsuccessful , reinserted 2/8/2022  F/U PT consult recs and discharge planning in next 24-48hrs

## 2022-02-11 LAB
ANION GAP SERPL CALC-SCNC: 9 MMOL/L — SIGNIFICANT CHANGE UP (ref 7–14)
APPEARANCE UR: CLEAR — SIGNIFICANT CHANGE UP
BACTERIA # UR AUTO: ABNORMAL
BILIRUB UR-MCNC: NEGATIVE — SIGNIFICANT CHANGE UP
BUN SERPL-MCNC: 17 MG/DL — SIGNIFICANT CHANGE UP (ref 7–23)
CALCIUM SERPL-MCNC: 8.2 MG/DL — LOW (ref 8.4–10.5)
CHLORIDE SERPL-SCNC: 107 MMOL/L — SIGNIFICANT CHANGE UP (ref 98–107)
CO2 SERPL-SCNC: 21 MMOL/L — LOW (ref 22–31)
COLOR SPEC: SIGNIFICANT CHANGE UP
CREAT SERPL-MCNC: 1.19 MG/DL — SIGNIFICANT CHANGE UP (ref 0.5–1.3)
DIFF PNL FLD: ABNORMAL
EPI CELLS # UR: 0 /HPF — SIGNIFICANT CHANGE UP (ref 0–5)
GLUCOSE BLDC GLUCOMTR-MCNC: 155 MG/DL — HIGH (ref 70–99)
GLUCOSE BLDC GLUCOMTR-MCNC: 166 MG/DL — HIGH (ref 70–99)
GLUCOSE BLDC GLUCOMTR-MCNC: 167 MG/DL — HIGH (ref 70–99)
GLUCOSE BLDC GLUCOMTR-MCNC: 91 MG/DL — SIGNIFICANT CHANGE UP (ref 70–99)
GLUCOSE SERPL-MCNC: 76 MG/DL — SIGNIFICANT CHANGE UP (ref 70–99)
GLUCOSE UR QL: ABNORMAL
HCT VFR BLD CALC: 29.6 % — LOW (ref 39–50)
HGB BLD-MCNC: 9.6 G/DL — LOW (ref 13–17)
HYALINE CASTS # UR AUTO: 1 /LPF — SIGNIFICANT CHANGE UP (ref 0–7)
KETONES UR-MCNC: NEGATIVE — SIGNIFICANT CHANGE UP
LEUKOCYTE ESTERASE UR-ACNC: NEGATIVE — SIGNIFICANT CHANGE UP
MCHC RBC-ENTMCNC: 29.4 PG — SIGNIFICANT CHANGE UP (ref 27–34)
MCHC RBC-ENTMCNC: 32.4 GM/DL — SIGNIFICANT CHANGE UP (ref 32–36)
MCV RBC AUTO: 90.5 FL — SIGNIFICANT CHANGE UP (ref 80–100)
NITRITE UR-MCNC: NEGATIVE — SIGNIFICANT CHANGE UP
NRBC # BLD: 0 /100 WBCS — SIGNIFICANT CHANGE UP
NRBC # FLD: 0 K/UL — SIGNIFICANT CHANGE UP
PH UR: 6.5 — SIGNIFICANT CHANGE UP (ref 5–8)
PLATELET # BLD AUTO: 153 K/UL — SIGNIFICANT CHANGE UP (ref 150–400)
POTASSIUM SERPL-MCNC: 4.5 MMOL/L — SIGNIFICANT CHANGE UP (ref 3.5–5.3)
POTASSIUM SERPL-SCNC: 4.5 MMOL/L — SIGNIFICANT CHANGE UP (ref 3.5–5.3)
PROT UR-MCNC: ABNORMAL
RBC # BLD: 3.27 M/UL — LOW (ref 4.2–5.8)
RBC # FLD: 17.1 % — HIGH (ref 10.3–14.5)
RBC CASTS # UR COMP ASSIST: 10 /HPF — SIGNIFICANT CHANGE UP (ref 0–4)
SODIUM SERPL-SCNC: 137 MMOL/L — SIGNIFICANT CHANGE UP (ref 135–145)
SP GR SPEC: 1.02 — SIGNIFICANT CHANGE UP (ref 1–1.05)
UROBILINOGEN FLD QL: SIGNIFICANT CHANGE UP
WBC # BLD: 8.48 K/UL — SIGNIFICANT CHANGE UP (ref 3.8–10.5)
WBC # FLD AUTO: 8.48 K/UL — SIGNIFICANT CHANGE UP (ref 3.8–10.5)
WBC UR QL: 3 /HPF — SIGNIFICANT CHANGE UP (ref 0–5)

## 2022-02-11 PROCEDURE — 99232 SBSQ HOSP IP/OBS MODERATE 35: CPT

## 2022-02-11 RX ORDER — MIDODRINE HYDROCHLORIDE 2.5 MG/1
5 TABLET ORAL THREE TIMES A DAY
Refills: 0 | Status: COMPLETED | OUTPATIENT
Start: 2022-02-11 | End: 2022-02-12

## 2022-02-11 RX ORDER — INSULIN GLARGINE 100 [IU]/ML
5 INJECTION, SOLUTION SUBCUTANEOUS AT BEDTIME
Refills: 0 | Status: DISCONTINUED | OUTPATIENT
Start: 2022-02-11 | End: 2022-02-18

## 2022-02-11 RX ADMIN — Medication 2: at 17:32

## 2022-02-11 RX ADMIN — INSULIN GLARGINE 5 UNIT(S): 100 INJECTION, SOLUTION SUBCUTANEOUS at 21:37

## 2022-02-11 RX ADMIN — Medication 2: at 12:11

## 2022-02-11 RX ADMIN — PANTOPRAZOLE SODIUM 40 MILLIGRAM(S): 20 TABLET, DELAYED RELEASE ORAL at 17:32

## 2022-02-11 RX ADMIN — PANTOPRAZOLE SODIUM 40 MILLIGRAM(S): 20 TABLET, DELAYED RELEASE ORAL at 05:32

## 2022-02-11 RX ADMIN — MIDODRINE HYDROCHLORIDE 15 MILLIGRAM(S): 2.5 TABLET ORAL at 05:27

## 2022-02-11 RX ADMIN — MIDODRINE HYDROCHLORIDE 5 MILLIGRAM(S): 2.5 TABLET ORAL at 21:37

## 2022-02-11 RX ADMIN — BUDESONIDE AND FORMOTEROL FUMARATE DIHYDRATE 2 PUFF(S): 160; 4.5 AEROSOL RESPIRATORY (INHALATION) at 09:02

## 2022-02-11 RX ADMIN — ATORVASTATIN CALCIUM 40 MILLIGRAM(S): 80 TABLET, FILM COATED ORAL at 21:37

## 2022-02-11 RX ADMIN — BUDESONIDE AND FORMOTEROL FUMARATE DIHYDRATE 2 PUFF(S): 160; 4.5 AEROSOL RESPIRATORY (INHALATION) at 21:38

## 2022-02-11 RX ADMIN — POLYETHYLENE GLYCOL 3350 17 GRAM(S): 17 POWDER, FOR SOLUTION ORAL at 11:54

## 2022-02-11 RX ADMIN — MIDODRINE HYDROCHLORIDE 5 MILLIGRAM(S): 2.5 TABLET ORAL at 14:08

## 2022-02-11 RX ADMIN — TAMSULOSIN HYDROCHLORIDE 0.4 MILLIGRAM(S): 0.4 CAPSULE ORAL at 21:37

## 2022-02-11 RX ADMIN — Medication 81 MILLIGRAM(S): at 11:54

## 2022-02-11 RX ADMIN — CITALOPRAM 10 MILLIGRAM(S): 10 TABLET, FILM COATED ORAL at 11:54

## 2022-02-11 RX ADMIN — CHLORHEXIDINE GLUCONATE 1 APPLICATION(S): 213 SOLUTION TOPICAL at 06:47

## 2022-02-11 NOTE — PROGRESS NOTE ADULT - ASSESSMENT
76M with PMHx HFrEF (TTE 8/2011 showed moderate segmental LVSD), CAD s/p CABG, HTN, DM, MDD, hx of COPD, hx of CVA with hemiparesis 2011, GERD and GI bleed in the past.   Presented 2-2-2022  from [facility] with  due to coffee ground emesis, bp 70/40mmHg, AMS and  reported he had complained of  right lower quadrant sharp abd pain 1 week duration Admitted to the ICU due to shock  requiring levophed, anemic to Hb 5.6, with hyponatremia 128 , DUSTIN bun 165-3.25 , s/p 3u PRBC. Course complicated by worsening high AG acidosis and DKA, s/p insulin gtt. Pt now off pressors, on midodrine  transferred to medical  floors 2/7/2022    hemorrhagic Shock vs septic  - resolved   UGIB vs infection   - Off levophed   - s/p IVF resuscitation.  - s/p empiric zosyn and vanc by level  - Holding home carvedilol, ASA _81 mg daily, -- was restarted 2/7, as per discussion with GI   - continue to  taper  midodrine down from 20  to 10mg TID, monitor for bradycardia ,may  even stop if bp improves and htn arises.     acute upper GI bleed with  Coffee ground emesis DDX stress PUD from dustin     on admission  - Cont Pantoprazole 40mg BID   - Holding off on EGD per GI, as per has no active bleeding and is hemodynamically stable,   - Diet as tolerated__ discussed with GI -- 02/07__ advanced to mechanical soft on 02/07   - Cont Bowel regimen     acute blood loss Anemia   Likely 2/2 UGIB and dustin   - Hb 5.6 on admission, received 3U pRBCs on 2/2. Hb,   2-8-2022  hemoglobin 7.0g/dl  s/p 1 prbc    Hb today 9.6g/dl   - Hb goal >=8  - CBC daily   -- Heparin SC was restarted, as per discussion with GI, held  after needing transfusion, i suspect hypovolemic and dilution after giving him IV fluids hemoglobin decreased , no acute bleeding noted  - Maintain active T&S    DUSTIN - improving  Likely prerenal/hemodynamically related 2/2 hypotension in setting of GIB/ anemia / DKA  - Cr 3.25 on admission,  now  Cr 1.19  - ---> 17  - Hall discontinued  , but reinserted after failed void trial , has been on flomax and completed a course of iv zosyn for possible cystitis   - Cont to monitor urine output  void trial in 7 days on monday   repeat UA negative for infection    #HFrEF, CAD, troponinemia (1320--> 1192)  likely secondary to demand ischemia from hypotension and dustin   . Last documented LVEF in [employer] was 27%  - TTE 2/3:  unable to assess EF/LV function.  moderate mitral stenosis. Calcified trileaflet aortic valve with mildly decreased, Grossly the anterolateral and possibly the mid to distal segments of the anterior wall are hypokinetic. The right ventricle is not well visualized.  - Cardiology consulted, . Likely demand ischemia.    optimizes medical therapy , on aspirin 81mg   in addition was not candidate for anticoagulation or cath due to acute bleed and dustin     - No acute intervention per cardiology now     Hx COPD  - Restarted home Symbicort   - Saturating well on RA- 98 %     Abd pain  Likely 2/2 UGIB. POCUS showed cholelithiasis, CBD not dilated.   Abdominal ultrasound with biliary sludge and cholelithiasis. No acute shellie.    GERD  - on pantoprazole 40 IV BID__ changed it to 40 PO BID   - takes omeprazole at home    - DKA resolved,   HAGMA - resolved  Dm2    asymptomatic hypoglycemia in mornings for second day despite reduction of insulin    decreased lantus from 20 unitsqhs to 15 units, decrease further to  5 units QHS    - ISS   - Continue to monitor BMP  - Carb consistent diet      Neutrophilic leukocytosis suspected due to  2/2 infection.  Procal 3.21 (2/3) , WBC 13.7 (2/3)-- peaked wbc 17k-> 12.38   - s/p IVFs  - completed  empiric Zosyn ( started on 2/3 for 5 day course), last day 02/08 , possible had acute Cystitis vs distension from urine retention on ct imaging lags behind clinical recovery  - MRSA screen positive (2/3) ,was given 1 dose of vanc on 2/3 and was started on mupirocin x5 day course.   - BCx 2/3- NGTD, to be finalize  - UA 2/3 large leuks, small ketones,  UCx 2/5- neg   blood and urine culture no growth     #VTE ppx: SCDs  ETHICS: Full code    CT abdomen and pelvis without Iv contrast reviewed.   IMPRESSION:  Limited noncontrast exam.  Hall catheter. 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  small Bilateral pleural effusions.no diverticulitis or intraabdominal infection.    plan:  decrease lantus to 5 units qhs  taper midodrine further  and consider discontinue if bp stable   Continue to trend CBC daily , monitor for  signs of bleeding. Transfuse prn to maintain Hgb >8  continue Protonix 40mg bid   monitor fingerstick glucose, and may be ok with  insulin sliding scale  therapy and off lantus, can consider oral hypoglycemic once renal function stable  cont to monitor urine output and renal function for resolving DUSTIN. Indwelling hall catheter was discontinued on 02/07 and TOV unsuccessful , reinserted 2/8/2022  void trial monday outpatient urology follow up   F/U PT consult recs and discharge planning  to STR pending placement

## 2022-02-11 NOTE — CHART NOTE - NSCHARTNOTEFT_GEN_A_CORE
NUTRITION FOLLOW-UP:    76M with PMH HFrEF (TTE 8/2011 showed moderate segmental LVSD), CAD s/p CABG, HTN, DM, MDD, hx of COPD, hx of CVA with hemiparesis 2011, GERD and GI bleed in the past. Presented 2-2-2022  from [facility] with  due to coffee ground emesis, bp 70/40mmHg, AMS and  reported he had complained of  right lower quadrant sharp abd pain 1 week duration Admitted to the ICU due to shock  requiring levophed, anemic to Hb 5.6, with hyponatremia 128 , DUSTIN bun 165-3.25 , s/p 3u PRBC. Course complicated by worsening high AG acidosis and DKA, s/p insulin gtt. Pt now off pressors, on midodrine  transferred to medical  floors 2/7/2022    Pt f/u for mild malnutrition, reports >75% intake of meals with No GI distress (nausea/vomiting/diarrhea/constipation.) at present. Noted +2 edema to Lt and Rt arm per RN flow sheet.      Weight: No updated wts.    Labs:  02-11 Na 137 mmol/L Glu 76 mg/dL K+ 4.5 mmol/L Cr 1.19 mg/dL BUN 17 mg/dL Phos n/a    02-11 @ 11:59 POCT 155 mg/dL  02-11 @ 08:08 POCT 91 mg/dL  02-10 @ 20:55 POCT 117 mg/dL  02-10 @ 17:13 POCT 87 mg/dL    Current Diet: Diet, Pureed:   Consistent Carbohydrate {No Snacks} (CSTCHO)  DASH/TLC {Sodium & Cholesterol Restricted} (DASH)  Mildly Thick Liquids (MILDTHICKLIQS) (02-08-22 @ 16:04)    Skin- +2 edema left arm; right arm as per RN flow sheet    Estimated needs:  No changes since previous assessment.     Nutrition Diagnosis:  Ongoing Mild malnutrition    RD to Remain Available:    Additional Recommendations:   Monitor PO intake, weight trends, nutrition related lab values, BMs/GI distress, hydration status, skin integrity.       Barbara Ling RDN #[id number]

## 2022-02-11 NOTE — PROGRESS NOTE ADULT - SUBJECTIVE AND OBJECTIVE BOX
PROGRESS NOTE:     Patient is a 76y old  Male who presents with a chief complaint of Abdominal pain (10 Feb 2022 16:24)      SUBJECTIVE / OVERNIGHT EVENTS:  denies nausea, or vomiting, denies abdominal pain     ADDITIONAL REVIEW OF SYSTEMS:  denies fever      MEDICATIONS  (STANDING):  aspirin enteric coated 81 milliGRAM(s) Oral daily  atorvastatin 40 milliGRAM(s) Oral daily  budesonide  80 MICROgram(s)/formoterol 4.5 MICROgram(s) Inhaler 2 Puff(s) Inhalation two times a day  citalopram 10 milliGRAM(s) Oral daily  dextrose 40% Gel 15 Gram(s) Oral once  dextrose 40% Gel 15 Gram(s) Oral once  dextrose 5%. 1000 milliLiter(s) (50 mL/Hr) IV Continuous <Continuous>  dextrose 50% Injectable 25 Gram(s) IV Push once  dextrose 50% Injectable 12.5 Gram(s) IV Push once  dextrose 50% Injectable 25 Gram(s) IV Push once  dextrose 50% Injectable 25 Gram(s) IV Push once  dextrose 50% Injectable 12.5 Gram(s) IV Push once  glucagon  Injectable 1 milliGRAM(s) IntraMuscular once  insulin glargine Injectable (LANTUS) 5 Unit(s) SubCutaneous at bedtime  insulin lispro (ADMELOG) corrective regimen sliding scale   SubCutaneous three times a day before meals  insulin lispro (ADMELOG) corrective regimen sliding scale   SubCutaneous at bedtime  midodrine 5 milliGRAM(s) Oral three times a day  pantoprazole    Tablet 40 milliGRAM(s) Oral two times a day  polyethylene glycol 3350 17 Gram(s) Oral daily  senna 2 Tablet(s) Oral at bedtime  tamsulosin 0.4 milliGRAM(s) Oral at bedtime    MEDICATIONS  (PRN):      CAPILLARY BLOOD GLUCOSE      POCT Blood Glucose.: 167 mg/dL (2022 21:26)  POCT Blood Glucose.: 166 mg/dL (2022 17:27)  POCT Blood Glucose.: 155 mg/dL (2022 11:59)  POCT Blood Glucose.: 91 mg/dL (2022 08:08)    I&O's Summary    10 Feb 2022 07:01  -  2022 07:00  --------------------------------------------------------  IN: 100 mL / OUT: 700 mL / NET: -600 mL    2022 07:01  -  2022 21:35  --------------------------------------------------------  IN: 720 mL / OUT: 600 mL / NET: 120 mL        PHYSICAL EXAM:  Vital Signs Last 24 Hrs  T(C): 36.6 (2022 14:05), Max: 36.6 (2022 05:00)  T(F): 97.8 (2022 14:05), Max: 97.9 (2022 05:00)  HR: 86 (2022 14:05) (78 - 86)  BP: 130/64 (2022 14:05) (125/49 - 130/64)  BP(mean): --  RR: 18 (2022 14:05) (18 - 18)  SpO2: 100% (2022 14:05) (100% - 100%)    CONSTITUTIONAL: NAD, well-developed  awake, alert oriented to self   lungs ctab   abdomen soft nontender  hall catheter  present    LABS:                        9.6    8.48  )-----------( 153      ( 2022 06:33 )             29.6     02-11    137  |  107  |  17  ----------------------------<  76  4.5   |  21<L>  |  1.19    Ca    8.2<L>      2022 06:33  Phos  3.0     02-10  Mg     1.70     02-10        Urinalysis Basic - ( 2022 17:14 )    Color: Light Yellow / Appearance: Clear / S.016 / pH: x  Gluc: x / Ketone: Negative  / Bili: Negative / Urobili: <2 mg/dL   Blood: x / Protein: 30 mg/dL / Nitrite: Negative   Leuk Esterase: Negative / RBC: 10 /HPF / WBC 3 /HPF   Sq Epi: x / Non Sq Epi: 0 /HPF / Bacteria: Few          RADIOLOGY & ADDITIONAL TESTS:  Results Reviewed: y  Imaging Personally Reviewed:y  Electrocardiogram Personally Reviewed:n    COORDINATION OF CARE:  Care Discussed with Consultants/Other Providers [Y/N]:y  Prior or Outpatient Records Reviewed [Y/N]:n

## 2022-02-12 DIAGNOSIS — I50.42 CHRONIC COMBINED SYSTOLIC (CONGESTIVE) AND DIASTOLIC (CONGESTIVE) HEART FAILURE: ICD-10-CM

## 2022-02-12 DIAGNOSIS — I50.22 CHRONIC SYSTOLIC (CONGESTIVE) HEART FAILURE: ICD-10-CM

## 2022-02-12 DIAGNOSIS — A41.9 SEPSIS, UNSPECIFIED ORGANISM: ICD-10-CM

## 2022-02-12 DIAGNOSIS — Z29.9 ENCOUNTER FOR PROPHYLACTIC MEASURES, UNSPECIFIED: ICD-10-CM

## 2022-02-12 LAB
GLUCOSE BLDC GLUCOMTR-MCNC: 151 MG/DL — HIGH (ref 70–99)
GLUCOSE BLDC GLUCOMTR-MCNC: 179 MG/DL — HIGH (ref 70–99)
GLUCOSE BLDC GLUCOMTR-MCNC: 183 MG/DL — HIGH (ref 70–99)
GLUCOSE BLDC GLUCOMTR-MCNC: 93 MG/DL — SIGNIFICANT CHANGE UP (ref 70–99)

## 2022-02-12 PROCEDURE — 99233 SBSQ HOSP IP/OBS HIGH 50: CPT

## 2022-02-12 RX ORDER — HEPARIN SODIUM 5000 [USP'U]/ML
5000 INJECTION INTRAVENOUS; SUBCUTANEOUS EVERY 12 HOURS
Refills: 0 | Status: DISCONTINUED | OUTPATIENT
Start: 2022-02-12 | End: 2022-02-18

## 2022-02-12 RX ORDER — MIDODRINE HYDROCHLORIDE 2.5 MG/1
5 TABLET ORAL THREE TIMES A DAY
Refills: 0 | Status: DISCONTINUED | OUTPATIENT
Start: 2022-02-13 | End: 2022-02-18

## 2022-02-12 RX ADMIN — Medication 2: at 17:22

## 2022-02-12 RX ADMIN — BUDESONIDE AND FORMOTEROL FUMARATE DIHYDRATE 2 PUFF(S): 160; 4.5 AEROSOL RESPIRATORY (INHALATION) at 21:30

## 2022-02-12 RX ADMIN — CITALOPRAM 10 MILLIGRAM(S): 10 TABLET, FILM COATED ORAL at 11:52

## 2022-02-12 RX ADMIN — Medication 81 MILLIGRAM(S): at 11:52

## 2022-02-12 RX ADMIN — MIDODRINE HYDROCHLORIDE 5 MILLIGRAM(S): 2.5 TABLET ORAL at 21:30

## 2022-02-12 RX ADMIN — POLYETHYLENE GLYCOL 3350 17 GRAM(S): 17 POWDER, FOR SOLUTION ORAL at 11:52

## 2022-02-12 RX ADMIN — MIDODRINE HYDROCHLORIDE 5 MILLIGRAM(S): 2.5 TABLET ORAL at 14:24

## 2022-02-12 RX ADMIN — MIDODRINE HYDROCHLORIDE 5 MILLIGRAM(S): 2.5 TABLET ORAL at 05:53

## 2022-02-12 RX ADMIN — TAMSULOSIN HYDROCHLORIDE 0.4 MILLIGRAM(S): 0.4 CAPSULE ORAL at 21:30

## 2022-02-12 RX ADMIN — ATORVASTATIN CALCIUM 40 MILLIGRAM(S): 80 TABLET, FILM COATED ORAL at 21:30

## 2022-02-12 RX ADMIN — PANTOPRAZOLE SODIUM 40 MILLIGRAM(S): 20 TABLET, DELAYED RELEASE ORAL at 17:52

## 2022-02-12 RX ADMIN — PANTOPRAZOLE SODIUM 40 MILLIGRAM(S): 20 TABLET, DELAYED RELEASE ORAL at 05:53

## 2022-02-12 RX ADMIN — INSULIN GLARGINE 5 UNIT(S): 100 INJECTION, SOLUTION SUBCUTANEOUS at 21:31

## 2022-02-12 RX ADMIN — Medication 2: at 12:22

## 2022-02-12 RX ADMIN — BUDESONIDE AND FORMOTEROL FUMARATE DIHYDRATE 2 PUFF(S): 160; 4.5 AEROSOL RESPIRATORY (INHALATION) at 08:30

## 2022-02-12 NOTE — PROGRESS NOTE ADULT - ASSESSMENT
76M with PMHx HFrEF (TTE 8/2011 showed moderate segmental LVSD), CAD s/p CABG, HTN, DM, MDD, hx of COPD, hx of CVA with hemiparesis 2011, GERD and GI bleed in the past.   Presented 2-2-2022  from [facility] with  due to coffee ground emesis, bp 70/40mmHg, AMS and  reported he had complained of  right lower quadrant sharp abd pain 1 week duration Admitted to the ICU due to shock  requiring levophed, anemic to Hb 5.6, with hyponatremia 128 , DUSTIN bun 165-3.25 , s/p 3u PRBC. Course complicated by worsening high AG acidosis and DKA, s/p insulin gtt. Pt now off pressors, on midodrine  transferred to medical  floors 2/7/2022, now still requiring midodrine to help BP.  Pt pending dc to STR.

## 2022-02-12 NOTE — PROGRESS NOTE ADULT - SUBJECTIVE AND OBJECTIVE BOX
Patient is a 76y old  Male who presents with a chief complaint of Abdominal pain (2022 21:35)      Interval History: Pt denies abdominal pain today, denies nausea, vomiting, melena this morning.      MEDICATIONS  (STANDING):  aspirin enteric coated 81 milliGRAM(s) Oral daily  atorvastatin 40 milliGRAM(s) Oral daily  budesonide  80 MICROgram(s)/formoterol 4.5 MICROgram(s) Inhaler 2 Puff(s) Inhalation two times a day  citalopram 10 milliGRAM(s) Oral daily  dextrose 40% Gel 15 Gram(s) Oral once  dextrose 40% Gel 15 Gram(s) Oral once  dextrose 5%. 1000 milliLiter(s) (50 mL/Hr) IV Continuous <Continuous>  dextrose 50% Injectable 25 Gram(s) IV Push once  dextrose 50% Injectable 12.5 Gram(s) IV Push once  dextrose 50% Injectable 25 Gram(s) IV Push once  dextrose 50% Injectable 25 Gram(s) IV Push once  dextrose 50% Injectable 12.5 Gram(s) IV Push once  glucagon  Injectable 1 milliGRAM(s) IntraMuscular once  insulin glargine Injectable (LANTUS) 5 Unit(s) SubCutaneous at bedtime  insulin lispro (ADMELOG) corrective regimen sliding scale   SubCutaneous three times a day before meals  insulin lispro (ADMELOG) corrective regimen sliding scale   SubCutaneous at bedtime  midodrine 5 milliGRAM(s) Oral three times a day  pantoprazole    Tablet 40 milliGRAM(s) Oral two times a day  polyethylene glycol 3350 17 Gram(s) Oral daily  senna 2 Tablet(s) Oral at bedtime  tamsulosin 0.4 milliGRAM(s) Oral at bedtime    MEDICATIONS  (PRN):      Objective    Vital Signs Last 24 Hrs  T(C): 37.1 (2022 13:52), Max: 37.1 (2022 13:52)  T(F): 98.7 (2022 13:52), Max: 98.7 (2022 13:52)  HR: 95 (2022 13:52) (75 - 95)  BP: 111/58 (2022 13:52) (102/59 - 113/58)  BP(mean): --  RR: 20 (2022 13:52) (18 - 20)  SpO2: 100% (2022 13:52) (98% - 100%)      CAPILLARY BLOOD GLUCOSE      POCT Blood Glucose.: 179 mg/dL (2022 16:47)  POCT Blood Glucose.: 183 mg/dL (2022 11:55)  POCT Blood Glucose.: 93 mg/dL (2022 08:15)  POCT Blood Glucose.: 167 mg/dL (2022 21:26)        22 @ 07:01  -  22 @ 07:00  --------------------------------------------------------  IN: 720 mL / OUT: 1400 mL / NET: -680 mL    22 @ 07:01  -  22 @ 18:27  --------------------------------------------------------  IN: 720 mL / OUT: 120 mL / NET: 600 mL        Appearance: Sitting in bed, NAD  HEENT:   Normal oral mucosa, PERRL, EOMI, anicteric sclera  Cardiovascular: RRR,  No murmurs, gallops or rubs appreciated  Respiratory: Lungs clear to auscultation bilaterally, no wheezes, crackles appreciated  Gastrointestinal:  Soft, nondistended, nontender, +BS	  Skin: No rashes, eccymosis or cyanosis noted	  Extremities: Moving all extremities equally  Vascular: no edema noted.  Psych:  Normal mood and affect, responds to questions appropriately          137  |  107  |  17  ----------------------------<  76  4.5   |  21<L>  |  1.19  02-10    137  |  108<H>  |  23  ----------------------------<  70  3.6   |  19<L>  |  1.33<H>    Ca    8.2<L>      2022 06:33  Ca    8.0<L>      10 Feb 2022 03:11                      Urinalysis Basic - ( 2022 17:14 )    Color: Light Yellow / Appearance: Clear / S.016 / pH: x  Gluc: x / Ketone: Negative  / Bili: Negative / Urobili: <2 mg/dL   Blood: x / Protein: 30 mg/dL / Nitrite: Negative   Leuk Esterase: Negative / RBC: 10 /HPF / WBC 3 /HPF   Sq Epi: x / Non Sq Epi: 0 /HPF / Bacteria: Few                              9.6    8.48  )-----------( 153      ( 2022 06:33 )             29.6                         8.7    8.94  )-----------( 154      ( 10 Feb 2022 03:11 )             25.9                         8.6    9.86  )-----------( 147      ( 2022 20:16 )             26.6     CAPILLARY BLOOD GLUCOSE      POCT Blood Glucose.: 179 mg/dL (2022 16:47)  POCT Blood Glucose.: 183 mg/dL (2022 11:55)  POCT Blood Glucose.: 93 mg/dL (2022 08:15)  POCT Blood Glucose.: 167 mg/dL (2022 21:26)          Radiology & Imaging    Imaging Personally Reviewed:    Consultant Notes Reviewed

## 2022-02-12 NOTE — PROGRESS NOTE ADULT - PROBLEM SELECTOR PLAN 1
Continue current medication  Discussed with Dr. Randhawa regarding use of oral contraceptive pills to help her acne and irregular cycles.  Check total and free testosterone with LH and FSH level today and rest of the labs before follow-up.   - pt with sepsis, now resolved, however continues to be on midodrine  - will wean as tolerated.

## 2022-02-13 LAB
ANION GAP SERPL CALC-SCNC: 10 MMOL/L — SIGNIFICANT CHANGE UP (ref 7–14)
BUN SERPL-MCNC: 11 MG/DL — SIGNIFICANT CHANGE UP (ref 7–23)
CALCIUM SERPL-MCNC: 8 MG/DL — LOW (ref 8.4–10.5)
CHLORIDE SERPL-SCNC: 107 MMOL/L — SIGNIFICANT CHANGE UP (ref 98–107)
CO2 SERPL-SCNC: 19 MMOL/L — LOW (ref 22–31)
CREAT SERPL-MCNC: 1.02 MG/DL — SIGNIFICANT CHANGE UP (ref 0.5–1.3)
GLUCOSE BLDC GLUCOMTR-MCNC: 106 MG/DL — HIGH (ref 70–99)
GLUCOSE BLDC GLUCOMTR-MCNC: 138 MG/DL — HIGH (ref 70–99)
GLUCOSE BLDC GLUCOMTR-MCNC: 151 MG/DL — HIGH (ref 70–99)
GLUCOSE BLDC GLUCOMTR-MCNC: 185 MG/DL — HIGH (ref 70–99)
GLUCOSE SERPL-MCNC: 104 MG/DL — HIGH (ref 70–99)
HCT VFR BLD CALC: 26.8 % — LOW (ref 39–50)
HGB BLD-MCNC: 8.6 G/DL — LOW (ref 13–17)
MAGNESIUM SERPL-MCNC: 1.5 MG/DL — LOW (ref 1.6–2.6)
MCHC RBC-ENTMCNC: 29.3 PG — SIGNIFICANT CHANGE UP (ref 27–34)
MCHC RBC-ENTMCNC: 32.1 GM/DL — SIGNIFICANT CHANGE UP (ref 32–36)
MCV RBC AUTO: 91.2 FL — SIGNIFICANT CHANGE UP (ref 80–100)
NRBC # BLD: 0 /100 WBCS — SIGNIFICANT CHANGE UP
NRBC # FLD: 0 K/UL — SIGNIFICANT CHANGE UP
PHOSPHATE SERPL-MCNC: 2.8 MG/DL — SIGNIFICANT CHANGE UP (ref 2.5–4.5)
PLATELET # BLD AUTO: 118 K/UL — LOW (ref 150–400)
POTASSIUM SERPL-MCNC: 4 MMOL/L — SIGNIFICANT CHANGE UP (ref 3.5–5.3)
POTASSIUM SERPL-SCNC: 4 MMOL/L — SIGNIFICANT CHANGE UP (ref 3.5–5.3)
RBC # BLD: 2.94 M/UL — LOW (ref 4.2–5.8)
RBC # FLD: 17.3 % — HIGH (ref 10.3–14.5)
SODIUM SERPL-SCNC: 136 MMOL/L — SIGNIFICANT CHANGE UP (ref 135–145)
WBC # BLD: 5.41 K/UL — SIGNIFICANT CHANGE UP (ref 3.8–10.5)
WBC # FLD AUTO: 5.41 K/UL — SIGNIFICANT CHANGE UP (ref 3.8–10.5)

## 2022-02-13 PROCEDURE — 99233 SBSQ HOSP IP/OBS HIGH 50: CPT

## 2022-02-13 RX ORDER — MAGNESIUM SULFATE 500 MG/ML
1 VIAL (ML) INJECTION ONCE
Refills: 0 | Status: COMPLETED | OUTPATIENT
Start: 2022-02-13 | End: 2022-02-13

## 2022-02-13 RX ADMIN — BUDESONIDE AND FORMOTEROL FUMARATE DIHYDRATE 2 PUFF(S): 160; 4.5 AEROSOL RESPIRATORY (INHALATION) at 09:19

## 2022-02-13 RX ADMIN — MIDODRINE HYDROCHLORIDE 5 MILLIGRAM(S): 2.5 TABLET ORAL at 05:13

## 2022-02-13 RX ADMIN — MIDODRINE HYDROCHLORIDE 5 MILLIGRAM(S): 2.5 TABLET ORAL at 22:04

## 2022-02-13 RX ADMIN — ATORVASTATIN CALCIUM 40 MILLIGRAM(S): 80 TABLET, FILM COATED ORAL at 11:40

## 2022-02-13 RX ADMIN — POLYETHYLENE GLYCOL 3350 17 GRAM(S): 17 POWDER, FOR SOLUTION ORAL at 11:41

## 2022-02-13 RX ADMIN — PANTOPRAZOLE SODIUM 40 MILLIGRAM(S): 20 TABLET, DELAYED RELEASE ORAL at 18:17

## 2022-02-13 RX ADMIN — TAMSULOSIN HYDROCHLORIDE 0.4 MILLIGRAM(S): 0.4 CAPSULE ORAL at 22:04

## 2022-02-13 RX ADMIN — MIDODRINE HYDROCHLORIDE 5 MILLIGRAM(S): 2.5 TABLET ORAL at 13:06

## 2022-02-13 RX ADMIN — HEPARIN SODIUM 5000 UNIT(S): 5000 INJECTION INTRAVENOUS; SUBCUTANEOUS at 18:17

## 2022-02-13 RX ADMIN — PANTOPRAZOLE SODIUM 40 MILLIGRAM(S): 20 TABLET, DELAYED RELEASE ORAL at 05:13

## 2022-02-13 RX ADMIN — BUDESONIDE AND FORMOTEROL FUMARATE DIHYDRATE 2 PUFF(S): 160; 4.5 AEROSOL RESPIRATORY (INHALATION) at 22:05

## 2022-02-13 RX ADMIN — HEPARIN SODIUM 5000 UNIT(S): 5000 INJECTION INTRAVENOUS; SUBCUTANEOUS at 05:13

## 2022-02-13 RX ADMIN — Medication 100 GRAM(S): at 10:20

## 2022-02-13 RX ADMIN — INSULIN GLARGINE 5 UNIT(S): 100 INJECTION, SOLUTION SUBCUTANEOUS at 22:03

## 2022-02-13 RX ADMIN — Medication 81 MILLIGRAM(S): at 11:40

## 2022-02-13 RX ADMIN — Medication 2: at 18:17

## 2022-02-13 RX ADMIN — Medication 2: at 12:48

## 2022-02-13 RX ADMIN — CITALOPRAM 10 MILLIGRAM(S): 10 TABLET, FILM COATED ORAL at 11:40

## 2022-02-13 NOTE — PROGRESS NOTE ADULT - PROBLEM SELECTOR PLAN 1
- pt with sepsis, now resolved, however continues to be on midodrine  - will wean as tolerated, currently still requiring.

## 2022-02-13 NOTE — PROGRESS NOTE ADULT - SUBJECTIVE AND OBJECTIVE BOX
Patient is a 76y old  Male who presents with a chief complaint of Abdominal pain (2022 18:27)      Interval History:     MEDICATIONS  (STANDING):  aspirin enteric coated 81 milliGRAM(s) Oral daily  atorvastatin 40 milliGRAM(s) Oral daily  budesonide  80 MICROgram(s)/formoterol 4.5 MICROgram(s) Inhaler 2 Puff(s) Inhalation two times a day  citalopram 10 milliGRAM(s) Oral daily  dextrose 40% Gel 15 Gram(s) Oral once  dextrose 40% Gel 15 Gram(s) Oral once  dextrose 5%. 1000 milliLiter(s) (50 mL/Hr) IV Continuous <Continuous>  dextrose 50% Injectable 25 Gram(s) IV Push once  dextrose 50% Injectable 12.5 Gram(s) IV Push once  dextrose 50% Injectable 25 Gram(s) IV Push once  dextrose 50% Injectable 25 Gram(s) IV Push once  dextrose 50% Injectable 12.5 Gram(s) IV Push once  glucagon  Injectable 1 milliGRAM(s) IntraMuscular once  heparin   Injectable 5000 Unit(s) SubCutaneous every 12 hours  insulin glargine Injectable (LANTUS) 5 Unit(s) SubCutaneous at bedtime  insulin lispro (ADMELOG) corrective regimen sliding scale   SubCutaneous three times a day before meals  insulin lispro (ADMELOG) corrective regimen sliding scale   SubCutaneous at bedtime  midodrine 5 milliGRAM(s) Oral three times a day  pantoprazole    Tablet 40 milliGRAM(s) Oral two times a day  polyethylene glycol 3350 17 Gram(s) Oral daily  senna 2 Tablet(s) Oral at bedtime  tamsulosin 0.4 milliGRAM(s) Oral at bedtime    MEDICATIONS  (PRN):      Objective    Vital Signs Last 24 Hrs  T(C): 36.5 (2022 05:11), Max: 37.2 (2022 21:18)  T(F): 97.7 (2022 05:11), Max: 98.9 (2022 21:18)  HR: 97 (2022 05:11) (95 - 98)  BP: 114/60 (2022 05:11) (110/59 - 114/60)  BP(mean): --  RR: 18 (2022 05:11) (18 - 20)  SpO2: 99% (2022 05:11) (99% - 100%)      CAPILLARY BLOOD GLUCOSE      POCT Blood Glucose.: 151 mg/dL (2022 21:01)  POCT Blood Glucose.: 179 mg/dL (2022 16:47)  POCT Blood Glucose.: 183 mg/dL (2022 11:55)        22 @ 07:01  -  22 @ 07:00  --------------------------------------------------------  IN: 956 mL / OUT: 1490 mL / NET: -534 mL        Appearance: Sitting in bed, NAD  HEENT:   Normal oral mucosa, PERRL, EOMI, anicteric sclera  Lymphatic: No lymphadenopathy  Cardiovascular: RRR, No JVD, No murmurs, gallops or rubs appreciated  Respiratory: Lungs clear to auscultation bilaterally, no wheezes, crackles appreciated  Gastrointestinal:  Soft, nondistended, nontender, +BS	  Skin: No rashes, eccymosis or cyanosis noted	  Neurologic: AOx3, CNII-XII grossly intact, motor and sensory function grossly intact  Extremities: Moving all extremities equally  Vascular: palpable dp, pt, femoral and radial pulses 2+ bilaterally  Psych:  Normal mood and affect, responds to questions appropriately          136  |  107  |  11  ----------------------------<  104<H>  4.0   |  19<L>  |  1.02      137  |  107  |  17  ----------------------------<  76  4.5   |  21<L>  |  1.19    Ca    8.0<L>      2022 06:34  Ca    8.2<L>      2022 06:33  Phos  2.8       Mg     1.50                           Urinalysis Basic - ( 2022 17:14 )    Color: Light Yellow / Appearance: Clear / S.016 / pH: x  Gluc: x / Ketone: Negative  / Bili: Negative / Urobili: <2 mg/dL   Blood: x / Protein: 30 mg/dL / Nitrite: Negative   Leuk Esterase: Negative / RBC: 10 /HPF / WBC 3 /HPF   Sq Epi: x / Non Sq Epi: 0 /HPF / Bacteria: Few                              8.6    5.41  )-----------( 118      ( 2022 06:34 )             26.8                         9.6    8.48  )-----------( 153      ( 2022 06:33 )             29.6     CAPILLARY BLOOD GLUCOSE      POCT Blood Glucose.: 151 mg/dL (2022 21:01)  POCT Blood Glucose.: 179 mg/dL (2022 16:47)  POCT Blood Glucose.: 183 mg/dL (2022 11:55)          Radiology & Imaging    Imaging Personally Reviewed:    Consultant Notes Reviewed Patient is a 76y old  Male who presents with a chief complaint of Abdominal pain (2022 18:27)      Interval History: No acute events overnight.  Midodrine renewed yesterday as bp still low  Patient denies fevers, chills,nausea, vomiting, dizziness    MEDICATIONS  (STANDING):  aspirin enteric coated 81 milliGRAM(s) Oral daily  atorvastatin 40 milliGRAM(s) Oral daily  budesonide  80 MICROgram(s)/formoterol 4.5 MICROgram(s) Inhaler 2 Puff(s) Inhalation two times a day  citalopram 10 milliGRAM(s) Oral daily  dextrose 40% Gel 15 Gram(s) Oral once  dextrose 40% Gel 15 Gram(s) Oral once  dextrose 5%. 1000 milliLiter(s) (50 mL/Hr) IV Continuous <Continuous>  dextrose 50% Injectable 25 Gram(s) IV Push once  dextrose 50% Injectable 12.5 Gram(s) IV Push once  dextrose 50% Injectable 25 Gram(s) IV Push once  dextrose 50% Injectable 25 Gram(s) IV Push once  dextrose 50% Injectable 12.5 Gram(s) IV Push once  glucagon  Injectable 1 milliGRAM(s) IntraMuscular once  heparin   Injectable 5000 Unit(s) SubCutaneous every 12 hours  insulin glargine Injectable (LANTUS) 5 Unit(s) SubCutaneous at bedtime  insulin lispro (ADMELOG) corrective regimen sliding scale   SubCutaneous three times a day before meals  insulin lispro (ADMELOG) corrective regimen sliding scale   SubCutaneous at bedtime  midodrine 5 milliGRAM(s) Oral three times a day  pantoprazole    Tablet 40 milliGRAM(s) Oral two times a day  polyethylene glycol 3350 17 Gram(s) Oral daily  senna 2 Tablet(s) Oral at bedtime  tamsulosin 0.4 milliGRAM(s) Oral at bedtime    MEDICATIONS  (PRN):      Objective    Vital Signs Last 24 Hrs  T(C): 36.5 (2022 05:11), Max: 37.2 (2022 21:18)  T(F): 97.7 (2022 05:11), Max: 98.9 (2022 21:18)  HR: 97 (2022 05:11) (95 - 98)  BP: 114/60 (2022 05:11) (110/59 - 114/60)  BP(mean): --  RR: 18 (2022 05:11) (18 - 20)  SpO2: 99% (2022 05:11) (99% - 100%)      CAPILLARY BLOOD GLUCOSE      POCT Blood Glucose.: 151 mg/dL (2022 21:01)  POCT Blood Glucose.: 179 mg/dL (2022 16:47)  POCT Blood Glucose.: 183 mg/dL (2022 11:55)        22 @ 07:01  -  22 @ 07:00  --------------------------------------------------------  IN: 956 mL / OUT: 1490 mL / NET: -534 mL        Appearance: Sitting in bed, NAD  HEENT:   Normal oral mucosa, PERRL, EOMI, anicteric sclera  Cardiovascular: RRR, No murmurs, gallops or rubs appreciated  Respiratory: Lungs clear to auscultation bilaterally, no wheezes, crackles appreciated  Gastrointestinal:  Soft, nondistended, nontender  Skin: No rashes, eccymosis or cyanosis noted	  Neurologic: AOx3  Extremities: Moving all extremities equally  Vascular: no edema noted  Psych:  Normal mood and affect, responds to questions appropriately          136  |  107  |  11  ----------------------------<  104<H>  4.0   |  19<L>  |  1.02      137  |  107  |  17  ----------------------------<  76  4.5   |  21<L>  |  1.19    Ca    8.0<L>      2022 06:34  Ca    8.2<L>      2022 06:33  Phos  2.8       Mg     1.50                           Urinalysis Basic - ( 2022 17:14 )    Color: Light Yellow / Appearance: Clear / S.016 / pH: x  Gluc: x / Ketone: Negative  / Bili: Negative / Urobili: <2 mg/dL   Blood: x / Protein: 30 mg/dL / Nitrite: Negative   Leuk Esterase: Negative / RBC: 10 /HPF / WBC 3 /HPF   Sq Epi: x / Non Sq Epi: 0 /HPF / Bacteria: Few                              8.6    5.41  )-----------( 118      ( 2022 06:34 )             26.8                         9.6    8.48  )-----------( 153      ( 2022 06:33 )             29.6     CAPILLARY BLOOD GLUCOSE      POCT Blood Glucose.: 151 mg/dL (2022 21:01)  POCT Blood Glucose.: 179 mg/dL (2022 16:47)  POCT Blood Glucose.: 183 mg/dL (2022 11:55)          Radiology & Imaging    Imaging Personally Reviewed: No new imaging    Consultant Notes Reviewed

## 2022-02-13 NOTE — PROGRESS NOTE ADULT - ASSESSMENT
76M with PMHx HFrEF (TTE 8/2011 showed moderate segmental LVSD), CAD s/p CABG, HTN, DM, MDD, hx of COPD, hx of CVA with hemiparesis 2011, GERD and GI bleed in the past.   Presented 2-2-2022  from [facility] with  due to coffee ground emesis, bp 70/40mmHg, AMS and  reported he had complained of  right lower quadrant sharp abd pain 1 week duration Admitted to the ICU due to shock  requiring levophed, anemic to Hb 5.6, with hyponatremia 128 , DUSTIN bun 165-3.25 , s/p 3u PRBC. Course complicated by worsening high AG acidosis and DKA, s/p insulin gtt. Pt now off pressors, on midodrine  transferred to medical  floors 2/7/2022, now still requiring midodrine to help BP.  Pt pending dc to STR.

## 2022-02-14 LAB
ANION GAP SERPL CALC-SCNC: 9 MMOL/L — SIGNIFICANT CHANGE UP (ref 7–14)
BUN SERPL-MCNC: 10 MG/DL — SIGNIFICANT CHANGE UP (ref 7–23)
CALCIUM SERPL-MCNC: 8.5 MG/DL — SIGNIFICANT CHANGE UP (ref 8.4–10.5)
CHLORIDE SERPL-SCNC: 105 MMOL/L — SIGNIFICANT CHANGE UP (ref 98–107)
CO2 SERPL-SCNC: 21 MMOL/L — LOW (ref 22–31)
CREAT SERPL-MCNC: 1.04 MG/DL — SIGNIFICANT CHANGE UP (ref 0.5–1.3)
GLUCOSE BLDC GLUCOMTR-MCNC: 140 MG/DL — HIGH (ref 70–99)
GLUCOSE BLDC GLUCOMTR-MCNC: 170 MG/DL — HIGH (ref 70–99)
GLUCOSE BLDC GLUCOMTR-MCNC: 187 MG/DL — HIGH (ref 70–99)
GLUCOSE BLDC GLUCOMTR-MCNC: 89 MG/DL — SIGNIFICANT CHANGE UP (ref 70–99)
GLUCOSE SERPL-MCNC: 84 MG/DL — SIGNIFICANT CHANGE UP (ref 70–99)
HCT VFR BLD CALC: 28 % — LOW (ref 39–50)
HGB BLD-MCNC: 8.8 G/DL — LOW (ref 13–17)
MAGNESIUM SERPL-MCNC: 1.7 MG/DL — SIGNIFICANT CHANGE UP (ref 1.6–2.6)
MCHC RBC-ENTMCNC: 28.8 PG — SIGNIFICANT CHANGE UP (ref 27–34)
MCHC RBC-ENTMCNC: 31.4 GM/DL — LOW (ref 32–36)
MCV RBC AUTO: 91.5 FL — SIGNIFICANT CHANGE UP (ref 80–100)
NRBC # BLD: 0 /100 WBCS — SIGNIFICANT CHANGE UP
NRBC # FLD: 0 K/UL — SIGNIFICANT CHANGE UP
PHOSPHATE SERPL-MCNC: 3.1 MG/DL — SIGNIFICANT CHANGE UP (ref 2.5–4.5)
PLATELET # BLD AUTO: 115 K/UL — LOW (ref 150–400)
POTASSIUM SERPL-MCNC: 4.1 MMOL/L — SIGNIFICANT CHANGE UP (ref 3.5–5.3)
POTASSIUM SERPL-SCNC: 4.1 MMOL/L — SIGNIFICANT CHANGE UP (ref 3.5–5.3)
RBC # BLD: 3.06 M/UL — LOW (ref 4.2–5.8)
RBC # FLD: 17.2 % — HIGH (ref 10.3–14.5)
SODIUM SERPL-SCNC: 135 MMOL/L — SIGNIFICANT CHANGE UP (ref 135–145)
WBC # BLD: 5.62 K/UL — SIGNIFICANT CHANGE UP (ref 3.8–10.5)
WBC # FLD AUTO: 5.62 K/UL — SIGNIFICANT CHANGE UP (ref 3.8–10.5)

## 2022-02-14 PROCEDURE — 99232 SBSQ HOSP IP/OBS MODERATE 35: CPT

## 2022-02-14 RX ADMIN — BUDESONIDE AND FORMOTEROL FUMARATE DIHYDRATE 2 PUFF(S): 160; 4.5 AEROSOL RESPIRATORY (INHALATION) at 10:16

## 2022-02-14 RX ADMIN — BUDESONIDE AND FORMOTEROL FUMARATE DIHYDRATE 2 PUFF(S): 160; 4.5 AEROSOL RESPIRATORY (INHALATION) at 22:15

## 2022-02-14 RX ADMIN — CITALOPRAM 10 MILLIGRAM(S): 10 TABLET, FILM COATED ORAL at 12:06

## 2022-02-14 RX ADMIN — MIDODRINE HYDROCHLORIDE 5 MILLIGRAM(S): 2.5 TABLET ORAL at 13:10

## 2022-02-14 RX ADMIN — ATORVASTATIN CALCIUM 40 MILLIGRAM(S): 80 TABLET, FILM COATED ORAL at 12:06

## 2022-02-14 RX ADMIN — Medication 81 MILLIGRAM(S): at 12:06

## 2022-02-14 RX ADMIN — MIDODRINE HYDROCHLORIDE 5 MILLIGRAM(S): 2.5 TABLET ORAL at 06:10

## 2022-02-14 RX ADMIN — Medication 2: at 12:07

## 2022-02-14 RX ADMIN — HEPARIN SODIUM 5000 UNIT(S): 5000 INJECTION INTRAVENOUS; SUBCUTANEOUS at 06:10

## 2022-02-14 RX ADMIN — MIDODRINE HYDROCHLORIDE 5 MILLIGRAM(S): 2.5 TABLET ORAL at 22:16

## 2022-02-14 RX ADMIN — PANTOPRAZOLE SODIUM 40 MILLIGRAM(S): 20 TABLET, DELAYED RELEASE ORAL at 06:10

## 2022-02-14 RX ADMIN — HEPARIN SODIUM 5000 UNIT(S): 5000 INJECTION INTRAVENOUS; SUBCUTANEOUS at 18:22

## 2022-02-14 RX ADMIN — INSULIN GLARGINE 5 UNIT(S): 100 INJECTION, SOLUTION SUBCUTANEOUS at 22:17

## 2022-02-14 RX ADMIN — TAMSULOSIN HYDROCHLORIDE 0.4 MILLIGRAM(S): 0.4 CAPSULE ORAL at 22:18

## 2022-02-14 RX ADMIN — PANTOPRAZOLE SODIUM 40 MILLIGRAM(S): 20 TABLET, DELAYED RELEASE ORAL at 18:21

## 2022-02-14 NOTE — PROGRESS NOTE ADULT - SUBJECTIVE AND OBJECTIVE BOX
Patient is a 76y old  Male who presents with a chief complaint of Abdominal pain (13 Feb 2022 08:23)      SUBJECTIVE / OVERNIGHT EVENTS: feels ok, offers nor complaints - no bleed    ROS:  No HA/DZ  No Vision changes   No CP, SOB  No N/V/D  No Edema  No Rash  NO weakness, numbness    MEDICATIONS  (STANDING):  aspirin enteric coated 81 milliGRAM(s) Oral daily  atorvastatin 40 milliGRAM(s) Oral daily  budesonide  80 MICROgram(s)/formoterol 4.5 MICROgram(s) Inhaler 2 Puff(s) Inhalation two times a day  citalopram 10 milliGRAM(s) Oral daily  dextrose 40% Gel 15 Gram(s) Oral once  dextrose 40% Gel 15 Gram(s) Oral once  dextrose 5%. 1000 milliLiter(s) (50 mL/Hr) IV Continuous <Continuous>  dextrose 50% Injectable 25 Gram(s) IV Push once  dextrose 50% Injectable 12.5 Gram(s) IV Push once  dextrose 50% Injectable 25 Gram(s) IV Push once  dextrose 50% Injectable 25 Gram(s) IV Push once  dextrose 50% Injectable 12.5 Gram(s) IV Push once  glucagon  Injectable 1 milliGRAM(s) IntraMuscular once  heparin   Injectable 5000 Unit(s) SubCutaneous every 12 hours  insulin glargine Injectable (LANTUS) 5 Unit(s) SubCutaneous at bedtime  insulin lispro (ADMELOG) corrective regimen sliding scale   SubCutaneous three times a day before meals  insulin lispro (ADMELOG) corrective regimen sliding scale   SubCutaneous at bedtime  midodrine 5 milliGRAM(s) Oral three times a day  pantoprazole    Tablet 40 milliGRAM(s) Oral two times a day  polyethylene glycol 3350 17 Gram(s) Oral daily  senna 2 Tablet(s) Oral at bedtime  tamsulosin 0.4 milliGRAM(s) Oral at bedtime    MEDICATIONS  (PRN):      T(C): 36.4 (02-14-22 @ 06:02)  HR: 81 (02-14-22 @ 06:02)  BP: 122/56 (02-14-22 @ 06:02)  RR: 18 (02-14-22 @ 06:02)  SpO2: 100% (02-14-22 @ 06:02)  CAPILLARY BLOOD GLUCOSE      POCT Blood Glucose.: 89 mg/dL (14 Feb 2022 08:10)  POCT Blood Glucose.: 138 mg/dL (13 Feb 2022 21:39)  POCT Blood Glucose.: 151 mg/dL (13 Feb 2022 17:25)  POCT Blood Glucose.: 185 mg/dL (13 Feb 2022 12:07)    I&O's Summary    13 Feb 2022 07:01  -  14 Feb 2022 07:00  --------------------------------------------------------  IN: 690 mL / OUT: 1300 mL / NET: -610 mL        PHYSICAL EXAM:  GENERAL: NAD, well-developed, AOx3  HEAD:  Atraumatic, Normocephalic  EYES: EOMI, PERRL, conjunctiva and sclera clear  NECK: Supple, No JVD  CHEST/LUNG: Clear to auscultation bilaterally  HEART: Regular rate and rhythm; No murmurs, rubs, or gallops, No Edema  ABDOMEN: Soft, Nontender, Nondistended; Bowel sounds present  EXTREMITIES:  2+ Peripheral Pulses, No clubbing, cyanosis  PSYCH: No SI/HI  NEUROLOGY: non-focal  SKIN: No rashes or lesions    LABS:                        8.8    5.62  )-----------( 115      ( 14 Feb 2022 06:26 )             28.0     02-14    135  |  105  |  10  ----------------------------<  84  4.1   |  21<L>  |  1.04    Ca    8.5      14 Feb 2022 06:26  Phos  3.1     02-14  Mg     1.70     02-14                    RADIOLOGY & ADDITIONAL TESTS:    Imaging Personally Reviewed:    Consultant(s) Notes Reviewed:      Care Discussed with Consultants/Other Providers:

## 2022-02-14 NOTE — PROGRESS NOTE ADULT - ASSESSMENT
76M with PMHx HFrEF (TTE 8/2011 showed moderate segmental LVSD), CAD s/p CABG, HTN, DM, MDD, hx of COPD, hx of CVA with hemiparesis 2011, GERD and GI bleed in the past.   Presented 2-2-2022  from [facility] with  due to coffee ground emesis, bp 70/40mmHg, AMS and  reported he had complained of  right lower quadrant sharp abd pain 1 week duration Admitted to the ICU due to shock  requiring levophed, anemic to Hb 5.6, with hyponatremia 128 , DUSTIN bun 165-3.25 , s/p 3u PRBC. Course complicated by worsening high AG acidosis and DKA, s/p insulin gtt. Pt now off pressors, on midodrine  transferred to medical  floors 2/7/2022, now still requiring midodrine to help BP.  Pt pending dc to STR.

## 2022-02-14 NOTE — PROGRESS NOTE ADULT - PROBLEM SELECTOR PLAN 1
hemorrhagic Shock vs urosepsis  - resolved   UGIB vs infection   - Off levophed   - s/p IVF resuscitation.  - s/p empiric zosyn and vanc by level  -  held home carvedilol, ASA _81 mg daily, -- was restarted 2/7, as per discussion with GI   - tapered midodrine to 5mg TID, monitor for bradycardia , may stop if bp improves and htn arises.

## 2022-02-15 ENCOUNTER — TRANSCRIPTION ENCOUNTER (OUTPATIENT)
Age: 77
End: 2022-02-15

## 2022-02-15 LAB
ANION GAP SERPL CALC-SCNC: 10 MMOL/L — SIGNIFICANT CHANGE UP (ref 7–14)
BUN SERPL-MCNC: 11 MG/DL — SIGNIFICANT CHANGE UP (ref 7–23)
CALCIUM SERPL-MCNC: 8.5 MG/DL — SIGNIFICANT CHANGE UP (ref 8.4–10.5)
CHLORIDE SERPL-SCNC: 106 MMOL/L — SIGNIFICANT CHANGE UP (ref 98–107)
CO2 SERPL-SCNC: 19 MMOL/L — LOW (ref 22–31)
CREAT SERPL-MCNC: 1.05 MG/DL — SIGNIFICANT CHANGE UP (ref 0.5–1.3)
GLUCOSE BLDC GLUCOMTR-MCNC: 136 MG/DL — HIGH (ref 70–99)
GLUCOSE BLDC GLUCOMTR-MCNC: 141 MG/DL — HIGH (ref 70–99)
GLUCOSE BLDC GLUCOMTR-MCNC: 171 MG/DL — HIGH (ref 70–99)
GLUCOSE BLDC GLUCOMTR-MCNC: 224 MG/DL — HIGH (ref 70–99)
GLUCOSE BLDC GLUCOMTR-MCNC: 97 MG/DL — SIGNIFICANT CHANGE UP (ref 70–99)
GLUCOSE SERPL-MCNC: 88 MG/DL — SIGNIFICANT CHANGE UP (ref 70–99)
HCT VFR BLD CALC: 27.2 % — LOW (ref 39–50)
HGB BLD-MCNC: 9 G/DL — LOW (ref 13–17)
MAGNESIUM SERPL-MCNC: 1.6 MG/DL — SIGNIFICANT CHANGE UP (ref 1.6–2.6)
MCHC RBC-ENTMCNC: 30.2 PG — SIGNIFICANT CHANGE UP (ref 27–34)
MCHC RBC-ENTMCNC: 33.1 GM/DL — SIGNIFICANT CHANGE UP (ref 32–36)
MCV RBC AUTO: 91.3 FL — SIGNIFICANT CHANGE UP (ref 80–100)
NRBC # BLD: 0 /100 WBCS — SIGNIFICANT CHANGE UP
NRBC # FLD: 0 K/UL — SIGNIFICANT CHANGE UP
PHOSPHATE SERPL-MCNC: 3.2 MG/DL — SIGNIFICANT CHANGE UP (ref 2.5–4.5)
PLATELET # BLD AUTO: 105 K/UL — LOW (ref 150–400)
POTASSIUM SERPL-MCNC: 3.8 MMOL/L — SIGNIFICANT CHANGE UP (ref 3.5–5.3)
POTASSIUM SERPL-SCNC: 3.8 MMOL/L — SIGNIFICANT CHANGE UP (ref 3.5–5.3)
RBC # BLD: 2.98 M/UL — LOW (ref 4.2–5.8)
RBC # FLD: 16.9 % — HIGH (ref 10.3–14.5)
SARS-COV-2 RNA SPEC QL NAA+PROBE: SIGNIFICANT CHANGE UP
SODIUM SERPL-SCNC: 135 MMOL/L — SIGNIFICANT CHANGE UP (ref 135–145)
WBC # BLD: 4.79 K/UL — SIGNIFICANT CHANGE UP (ref 3.8–10.5)
WBC # FLD AUTO: 4.79 K/UL — SIGNIFICANT CHANGE UP (ref 3.8–10.5)

## 2022-02-15 PROCEDURE — 51702 INSERT TEMP BLADDER CATH: CPT

## 2022-02-15 PROCEDURE — 99232 SBSQ HOSP IP/OBS MODERATE 35: CPT

## 2022-02-15 RX ORDER — LIDOCAINE 4 G/100G
1 CREAM TOPICAL ONCE
Refills: 0 | Status: DISCONTINUED | OUTPATIENT
Start: 2022-02-15 | End: 2022-02-16

## 2022-02-15 RX ORDER — LANOLIN ALCOHOL/MO/W.PET/CERES
3 CREAM (GRAM) TOPICAL AT BEDTIME
Refills: 0 | Status: DISCONTINUED | OUTPATIENT
Start: 2022-02-15 | End: 2022-02-18

## 2022-02-15 RX ADMIN — PANTOPRAZOLE SODIUM 40 MILLIGRAM(S): 20 TABLET, DELAYED RELEASE ORAL at 17:38

## 2022-02-15 RX ADMIN — Medication 81 MILLIGRAM(S): at 11:57

## 2022-02-15 RX ADMIN — HEPARIN SODIUM 5000 UNIT(S): 5000 INJECTION INTRAVENOUS; SUBCUTANEOUS at 06:29

## 2022-02-15 RX ADMIN — POLYETHYLENE GLYCOL 3350 17 GRAM(S): 17 POWDER, FOR SOLUTION ORAL at 11:59

## 2022-02-15 RX ADMIN — BUDESONIDE AND FORMOTEROL FUMARATE DIHYDRATE 2 PUFF(S): 160; 4.5 AEROSOL RESPIRATORY (INHALATION) at 23:09

## 2022-02-15 RX ADMIN — INSULIN GLARGINE 5 UNIT(S): 100 INJECTION, SOLUTION SUBCUTANEOUS at 22:05

## 2022-02-15 RX ADMIN — ATORVASTATIN CALCIUM 40 MILLIGRAM(S): 80 TABLET, FILM COATED ORAL at 11:57

## 2022-02-15 RX ADMIN — TAMSULOSIN HYDROCHLORIDE 0.4 MILLIGRAM(S): 0.4 CAPSULE ORAL at 22:04

## 2022-02-15 RX ADMIN — MIDODRINE HYDROCHLORIDE 5 MILLIGRAM(S): 2.5 TABLET ORAL at 06:29

## 2022-02-15 RX ADMIN — MIDODRINE HYDROCHLORIDE 5 MILLIGRAM(S): 2.5 TABLET ORAL at 22:06

## 2022-02-15 RX ADMIN — PANTOPRAZOLE SODIUM 40 MILLIGRAM(S): 20 TABLET, DELAYED RELEASE ORAL at 06:29

## 2022-02-15 RX ADMIN — SENNA PLUS 2 TABLET(S): 8.6 TABLET ORAL at 22:04

## 2022-02-15 RX ADMIN — MIDODRINE HYDROCHLORIDE 5 MILLIGRAM(S): 2.5 TABLET ORAL at 14:37

## 2022-02-15 RX ADMIN — CITALOPRAM 10 MILLIGRAM(S): 10 TABLET, FILM COATED ORAL at 11:59

## 2022-02-15 RX ADMIN — BUDESONIDE AND FORMOTEROL FUMARATE DIHYDRATE 2 PUFF(S): 160; 4.5 AEROSOL RESPIRATORY (INHALATION) at 09:32

## 2022-02-15 RX ADMIN — HEPARIN SODIUM 5000 UNIT(S): 5000 INJECTION INTRAVENOUS; SUBCUTANEOUS at 17:39

## 2022-02-15 NOTE — DISCHARGE NOTE PROVIDER - NSDCCPCAREPLAN_GEN_ALL_CORE_FT
PRINCIPAL DISCHARGE DIAGNOSIS  Diagnosis: Severe hypotension  Assessment and Plan of Treatment: When you arrived to the hospital your blood pressure was low, possibly due to bleeding or infection. You were treated with antibiotics and with medications to help improve your blood pressure. Contniue your medication as prescribed at time of idscharge.      SECONDARY DISCHARGE DIAGNOSES  Diagnosis: Acute blood loss anemia  Assessment and Plan of Treatment: You required 3 units of blood while in the hospital and you were evaluated by the Gastroenterology team. You did not require any procedures and bleeding stopped on its own. Please follow up with your doctors for routine blood work.    Diagnosis: DUSTIN (acute kidney injury)  Assessment and Plan of Treatment: You had elevated kidney numbers when you came to the hospital as a result of your low blood pressure. Your numbers have since improved.    Diagnosis: Acute retention of urine  Assessment and Plan of Treatment: You had a Hung Catheter placed due to retention of urine. Please keep catheter in place and follow up with Urology as an outpatient. Information provided below for a urologist.    Diagnosis: NSTEMI (non-ST elevation myocardial infarction)  Assessment and Plan of Treatment: You had elevated heart enzymes in your blood as a result of your low blood pressure and increased stress on your heart. You did not require any cardiac procedures during your admission.    Diagnosis: DM2 (diabetes mellitus, type 2)  Assessment and Plan of Treatment: - Hypoglycemia Management : Please check your fingersticks every morning or if you are not feeling well.  If your fingerstick is >300 mg/dl x 3 or more readings, please contact your doctor. . If your fingerstick low, <70 mg/dl and/or you have symptoms of very low blood sugar, FIRST drink 1/2 cup of apple juice, (or take 4 glucose tabs/tube of glucose gel) and recheck fingerstick in 15 minutes.  Repeat these steps until blood sugar is above 100 mg/dl, IF NECESSARY.  Then call provider your doctor to discuss low blood sugar.     PRINCIPAL DISCHARGE DIAGNOSIS  Diagnosis: Severe hypotension  Assessment and Plan of Treatment: When you arrived to the hospital your blood pressure was low, possibly due to bleeding or infection. You were treated with antibiotics and with medications to help improve your blood pressure. Contniue Midodrine as prescribe if BP <130, and titrate as tolerated        SECONDARY DISCHARGE DIAGNOSES  Diagnosis: DUSTIN (acute kidney injury)  Assessment and Plan of Treatment: You had elevated kidney numbers when you came to the hospital as a result of your low blood pressure. Your numbers have since improved.  Avoid dehydration or nephrotoxic meds      Diagnosis: NSTEMI (non-ST elevation myocardial infarction)  Assessment and Plan of Treatment: - TTE 2/3:  unable to assess EF/LV function.  moderate mitral stenosis. Calcified trileaflet aortic valve with mildly decreased, Grossly the anterolateral and possibly the mid to distal segments of the anterior wall are hypokinetic. The right ventricle is not well visualized.  You had elevated heart enzymes in your blood as a result of your low blood pressure and increased stress on your heart. You did not require any cardiac procedures during your admission.  Continue daily Aspirin and Atorvastatin 40 mg daily HS      Diagnosis: Acute retention of urine  Assessment and Plan of Treatment: You had a Hung Catheter placed due to retention of urine. Please keep catheter in place and follow up with Urology as an outpatient. Information provided below for a urologist.  Hung to be changed as per rehab policy , continue Flomax  may attemp trial of void    Diagnosis: DM2 (diabetes mellitus, type 2)  Assessment and Plan of Treatment: - Hypoglycemia Management : Please check your fingersticks every morning or if you are not feeling well.  If your fingerstick is >300 mg/dl x 3 or more readings, please contact your doctor. . If your fingerstick low, <70 mg/dl and/or you have symptoms of very low blood sugar, FIRST drink 1/2 cup of apple juice, (or take 4 glucose tabs/tube of glucose gel) and recheck fingerstick in 15 minutes.  Repeat these steps until blood sugar is above 100 mg/dl, IF NECESSARY.  Then call provider your doctor to discuss low blood sugar.  Frank Bianchi/[place] as prescribed    Diagnosis: Acute blood loss anemia  Assessment and Plan of Treatment: You required 3 units of blood while in the hospital and you were evaluated by the Gastroenterology team. You did not require any procedures and bleeding stopped on its own. Please follow up with your doctors for routine blood work.

## 2022-02-15 NOTE — PROCEDURE NOTE - NSINDICATIONS_GEN_A_CORE
Paraphimosis/urinry obstruction or retention
history of difficult urethral catheterization/urinry obstruction or retention

## 2022-02-15 NOTE — PROVIDER CONTACT NOTE (OTHER) - ASSESSMENT
Pt c/o unable to urinate, lower abd slightly distended.
pt A&O2-3, per per " I don't have to pee", facial grimacing when attempt to insert hall, swelling around the tip of penis

## 2022-02-15 NOTE — PROGRESS NOTE ADULT - ASSESSMENT
76M with PMHx HFrEF (TTE 8/2011 showed moderate segmental LVSD), CAD s/p CABG, HTN, DM, MDD, hx of COPD, hx of CVA with hemiparesis 2011, GERD and GI bleed in the past.   Presented 2-2-2022  from [facility] with  due to coffee ground emesis, bp 70/40mmHg, AMS and  reported he had complained of  right lower quadrant sharp abd pain 1 week duration Admitted to the ICU due to shock  requiring levophed, anemic to Hb 5.6, with hyponatremia 128 , DUSTIN bun 165-3.25 , s/p 3u PRBC. Course complicated by worsening high AG acidosis and DKA, s/p insulin gtt. Pt now off pressors, on midodrine  transferred to medical  floors 2/7/2022, now still requiring midodrine to help BP.  Pt pending dc to STR.

## 2022-02-15 NOTE — DISCHARGE NOTE PROVIDER - DETAILS OF MALNUTRITION DIAGNOSIS/DIAGNOSES
This patient has been assessed with a concern for Malnutrition and was treated during this hospitalization for the following Nutrition diagnosis/diagnoses:     -  02/04/2022: Mild protein-calorie malnutrition

## 2022-02-15 NOTE — PROGRESS NOTE ADULT - SUBJECTIVE AND OBJECTIVE BOX
Patient is a 76y old  Male who presents with a chief complaint of Abdominal pain (14 Feb 2022 10:53)      SUBJECTIVE / OVERNIGHT EVENTS: no events, requiring straight cath for TOV for now - no overt bleed, feels well     ROS:  No HA/DZ  No Vision changes   No CP, SOB  No N/V/D  No Edema  No Rash  NO weakness, numbness    MEDICATIONS  (STANDING):  aspirin enteric coated 81 milliGRAM(s) Oral daily  atorvastatin 40 milliGRAM(s) Oral daily  budesonide  80 MICROgram(s)/formoterol 4.5 MICROgram(s) Inhaler 2 Puff(s) Inhalation two times a day  citalopram 10 milliGRAM(s) Oral daily  dextrose 40% Gel 15 Gram(s) Oral once  dextrose 40% Gel 15 Gram(s) Oral once  dextrose 5%. 1000 milliLiter(s) (50 mL/Hr) IV Continuous <Continuous>  dextrose 50% Injectable 25 Gram(s) IV Push once  dextrose 50% Injectable 12.5 Gram(s) IV Push once  dextrose 50% Injectable 25 Gram(s) IV Push once  dextrose 50% Injectable 25 Gram(s) IV Push once  dextrose 50% Injectable 12.5 Gram(s) IV Push once  glucagon  Injectable 1 milliGRAM(s) IntraMuscular once  heparin   Injectable 5000 Unit(s) SubCutaneous every 12 hours  insulin glargine Injectable (LANTUS) 5 Unit(s) SubCutaneous at bedtime  insulin lispro (ADMELOG) corrective regimen sliding scale   SubCutaneous three times a day before meals  insulin lispro (ADMELOG) corrective regimen sliding scale   SubCutaneous at bedtime  lidocaine 2% Gel 1 Application(s) Topical once  midodrine 5 milliGRAM(s) Oral three times a day  pantoprazole    Tablet 40 milliGRAM(s) Oral two times a day  polyethylene glycol 3350 17 Gram(s) Oral daily  senna 2 Tablet(s) Oral at bedtime  tamsulosin 0.4 milliGRAM(s) Oral at bedtime    MEDICATIONS  (PRN):      T(C): 36.7 (02-15-22 @ 06:20)  HR: 89 (02-15-22 @ 06:20)  BP: 112/53 (02-15-22 @ 06:20)  RR: 18 (02-15-22 @ 06:20)  SpO2: 98% (02-15-22 @ 06:20)  CAPILLARY BLOOD GLUCOSE      POCT Blood Glucose.: 97 mg/dL (15 Feb 2022 08:14)  POCT Blood Glucose.: 170 mg/dL (14 Feb 2022 21:54)  POCT Blood Glucose.: 140 mg/dL (14 Feb 2022 17:23)  POCT Blood Glucose.: 187 mg/dL (14 Feb 2022 11:35)    I&O's Summary    14 Feb 2022 07:01  -  15 Feb 2022 07:00  --------------------------------------------------------  IN: 560 mL / OUT: 870 mL / NET: -310 mL        PHYSICAL EXAM:  GENERAL: NAD, well-developed, AOx3  HEAD:  Atraumatic, Normocephalic  EYES: EOMI, PERRL, conjunctiva and sclera clear  NECK: Supple, No JVD  CHEST/LUNG: Clear to auscultation bilaterally  HEART: Regular rate and rhythm; No murmurs, rubs, or gallops, No Edema  ABDOMEN: Soft, Nontender, Nondistended; Bowel sounds present  EXTREMITIES:  2+ Peripheral Pulses, No clubbing, cyanosis  PSYCH: No SI/HI  NEUROLOGY: non-focal  SKIN: No rashes or lesions    LABS:                        9.0    4.79  )-----------( 105      ( 15 Feb 2022 06:30 )             27.2     02-15    135  |  106  |  11  ----------------------------<  88  3.8   |  19<L>  |  1.05    Ca    8.5      15 Feb 2022 06:30  Phos  3.2     02-15  Mg     1.60     02-15                    RADIOLOGY & ADDITIONAL TESTS:    Imaging Personally Reviewed:    Consultant(s) Notes Reviewed:      Care Discussed with Consultants/Other Providers:

## 2022-02-15 NOTE — DISCHARGE NOTE PROVIDER - CARE PROVIDER_API CALL
Cassandra Jain)  Urology  [address] [place] [address]  Phone: [phone]  Fax: [phone]  Follow Up Time:

## 2022-02-15 NOTE — PROCEDURE NOTE - NSURITECHNIQUE_GU_A_CORE
Proper hand hygiene was performed/Sterile gloves were worn for the duration of the procedure/A sterile drape was used to cover all adjacent areas/The site was cleaned with soap/water and sterile solution (betadine)/The catheter was appropriately lubricated/The catheter was secured with a securement device (e.g. StatLock)/The urinary drainage system is closed at the end of the procedure/The collection bag is below the level of the patient and urinary bladder
Proper hand hygiene was performed/Sterile gloves were worn for the duration of the procedure/A sterile drape was used to cover all adjacent areas/The catheter was appropriately lubricated/The catheter was secured with a securement device (e.g. StatLock)

## 2022-02-15 NOTE — PROCEDURE NOTE - ADDITIONAL PROCEDURE DETAILS
Patient with difficult hall due to unretracted foreskin and paraphimosis. After hall insertion, patient had 600 L output.    Plan  - Keep foreskin reduced covering the glans of the penis to prevent necrosis in the setting of existing and known paraphimosis.  - Monitor urine output closely for next 2 hours.  - f/u electrolyte levels K, Na as needed in the setting of increased urine output.
Patient presented with paraphimosis which was reduced at the bedside. After hall insertion, patient had 1 L of urine output.    Plan  - Keep foreskin reduced covering the glans of the penis to prevent necrosis in the setting of paraphimosis.  - Monitor urine output closely for next 2 hours.  - f/u electrolyte levels K, Na in the setting of high urine output post hall insertion.

## 2022-02-15 NOTE — CHART NOTE - NSCHARTNOTEFT_GEN_A_CORE
Pt due to void 8hrs post last void. Bladder scan result 592ml. Straight cath attempt failed x 3 (RN, Manager, ACP). Indwelling double lumen Coude Catheter tip Fr20 inserted by urology, draining to gravity. Pt will monitored for post obstructive diuresis. Pt reports relief.    Raquel Obrien NP  [id number]

## 2022-02-15 NOTE — PROVIDER CONTACT NOTE (OTHER) - REASON
pt retaining about 650cc per bladder scanner, unable to straight pt per provider request. attemptedx3 with severe resistance
bladder scan 592ml by ultrasound

## 2022-02-15 NOTE — PROVIDER CONTACT NOTE (OTHER) - BACKGROUND
admitted for GIB, coarse complicated by DUSTIN, and DKA, now all resolved, off of pressors now traferred to medicine
Pt admit for GI bleed, history of CHF, CAD, CABG, DM. isabel d/c 02/14 12:00noon, pt is unable to void, straight cath inserted on 02/14 at 03:00.

## 2022-02-15 NOTE — CHART NOTE - NSCHARTNOTESELECT_GEN_ALL_CORE
Event Note
Event Note
MAR Accept/Transfer Note
MICU/Transfer Note
Transfer Note
Acceptance Note/Event Note
Nutrition Services
POCUS

## 2022-02-15 NOTE — DISCHARGE NOTE PROVIDER - HOSPITAL COURSE
75 YO M with PMHx of Depression, COPD, HRrEF (TTE 8/2011 showed moderate segmental LVSD), CAD s/p CABG, HTN and DM presented from [facility] with abdominal pain and coffee ground emesis found to be anemic to 5.4 and hypotensive requiring pressors and admitted to MICU. Course further complicated by DUSTIN s/p rehydration via transfusions and HAGMA/ DKA s/p insulin GTT. Now transferred to Medicine 2/7.     Sepsis associated hypotension.   hemorrhagic Shock vs urosepsis. Resolved   UGIB vs infection   - Off levophed   - s/p IVF resuscitation.  - s/p empiric zosyn and vanc by level  -  held home carvedilol, ASA _81 mg daily, -- was restarted 2/7, as per discussion with GI   - tapered midodrine to 5mg TID, monitor for bradycardia , may stop if bp improves and htn arises.      DUSTIN (acute kidney injury).   DUSTIN now resolved - ATN likely in setting of hemorrhagic shock.      Acute retention of urine.   pt now with Hung, after failed TOV attempt   - c/w Flomax  - attempting TOV - if fails again, Uhng to stay and out-pt f/u   - no infx.    Chronic HFrEF (heart failure with reduced ejection fraction).   #HFrEF, CAD, troponinemia (1320--> 1192)  likely secondary to demand ischemia from hypotension and dustin   . Last documented LVEF in [employer] was 27%  - TTE 2/3:  unable to assess EF/LV function.  moderate mitral stenosis. Calcified trileaflet aortic valve with mildly decreased, Grossly the anterolateral and possibly the mid to distal segments of the anterior wall are hypokinetic. The right ventricle is not well visualized.  - Cardiology consulted, . Likely demand ischemia.    optimizes medical therapy , on aspirin 81mg   in addition was not candidate for anticoagulation or cath due to acute bleed and dustin     - No acute intervention per cardiology now.      DM2 (diabetes mellitus, type 2).   c/w lantus 5U qHS  - ISS, FS controlled   - no further hypoglycemic events.    Acute blood loss anemia.    s/p 3 units PRBC - counts now stable and resolved bleed   - c/w pantoprazole BID for GIB  - CBC daily  - no interventions per GI  as bleeding self resolved.      Need for prophylactic measure.   ·  Plan: DVT ppx: SQH and SCDs  Diet: Pureed  Dispo:  PT - rehab  medically stable P ALFRED.   76M with PMHx HFrEF (TTE 8/2011 showed moderate segmental LVSD), CAD s/p CABG, HTN, DM, MDD, hx of COPD, hx of CVA with hemiparesis 2011, GERD and GI bleed in the past.  Presented 2-2-2022  from [facility] with  due to coffee ground emesis, bp 70/40mmHg, AMS and  reported he had complained of  right lower quadrant sharp abd pain 1 week duration Admitted to the ICU due to shock  requiring levophed, anemic to Hb 5.6, with hyponatremia 128 , DUSTIN bun 165-3.25 , s/p 3u PRBC. Course complicated by worsening high AG acidosis and DKA, s/p insulin gtt. Pt now off pressors, on midodrine  transferred to medical  floors 2/7/2022, now still requiring midodrine to help BP.     Problem/Plan - 1:  ·  Problem: Sepsis associated hypotension.   ·  Plan: hemorrhagic Shock vs urosepsis  - resolved   UGIB vs infection  - s/p empiric zosyn and vanc by level  - Initially on levophed; titrated off and started on midodrine which was tapered to 5mg TID, monitor for bradycardia , may stop if bp improves and htn arises.  - s/p IVF resuscitation.  -  held home carvedilol, ASA _81 mg daily, -- was restarted 2/7, as per discussion with GI       Problem/Plan - 2:  ·  Problem: DUSTIN (acute kidney injury).   ·  Plan: DUSTIN now resolved - ATN likely in setting of hemorrhagic shock.    Problem/Plan - 3:  ·  Problem: Acute retention of urine.   ·  Plan: pt now with Hung, after failed TOV attempt x2  - Information for [facility] provided for follow up.   - c/w Flomax  - no infx.    Problem/Plan - 4:  ·  Problem: Chronic HFrEF (heart failure with reduced ejection fraction).   ·  Plan: #HFrEF, CAD, troponinemia (1320--> 1192)  likely secondary to demand ischemia from hypotension and dustin   . Last documented LVEF in [employer] was 27%  - TTE 2/3:  unable to assess EF/LV function.  moderate mitral stenosis. Calcified trileaflet aortic valve with mildly decreased, Grossly the anterolateral and possibly the mid to distal segments of the anterior wall are hypokinetic. The right ventricle is not well visualized.  - Cardiology consulted, . Likely demand ischemia.    optimizes medical therapy , on aspirin 81mg   in addition was not candidate for anticoagulation or cath due to acute bleed and dustin   - No acute intervention per cardiology now.    Problem/Plan - 5:  ·  Problem: DM2 (diabetes mellitus, type 2).   ·  Plan: c/w lantus 5U qHS while admitted to the hospital.   - ISS, FS controlled   - no further hypoglycemic events.    Problem/Plan - 6:  ·  Problem: Acute blood loss anemia.   ·  Plan: - s/p 3 units PRBC - counts now stable and resolved bleed   - c/w pantoprazole BID for GIB  - CBC daily  - no interventions per GI  as bleeding self resolved.      Case discussed with Dr. Salgado.. and Patient cleared for DC on ....     76M with PMHx HFrEF (TTE 8/2011 showed moderate segmental LVSD), CAD s/p CABG, HTN, DM, MDD, hx of COPD, hx of CVA with hemiparesis 2011, GERD and GI bleed in the past.  Presented 2-2-2022  from [facility] with  due to coffee ground emesis, bp 70/40mmHg, AMS and  reported he had complained of  right lower quadrant sharp abd pain 1 week duration Admitted to the ICU due to shock  requiring levophed, anemic to Hb 5.6, with hyponatremia 128 , DUSTIN bun 165-3.25 , s/p 3u PRBC. Course complicated by worsening high AG acidosis and DKA, s/p insulin gtt. Pt now off pressors, on midodrine  transferred to medical  floors 2/7/2022, now still requiring midodrine to help BP.     Sepsis associated hypotension.  - resolved   UGIB vs infection  - s/p empiric zosyn and vanc by level  - Initially on levophed; titrated off and started on midodrine which was tapered to 5mg TID, monitor for bradycardia , may stop if bp improves and htn arises.  - s/p IVF resuscitation.  -  held home carvedilol, ASA _81 mg daily, -- was restarted 2/7, as per discussion with GI     DUSTIN now resolved - ATN likely in setting of hemorrhagic shock.    Acute retention of urine- pt now with Hung, after failed TOV attempt x2  - Information for [facility] provided for follow up.   - c/w Flomax  - no infx.    Chronic HFrEF - #HFrEF, CAD, troponinemia (1320--> 1192)  likely secondary to demand ischemia from hypotension and dustin   . Last documented LVEF in [employer] was 27%  - TTE 2/3:  unable to assess EF/LV function.  moderate mitral stenosis. Calcified trileaflet aortic valve with mildly decreased, Grossly the anterolateral and possibly the mid to distal segments of the anterior wall are hypokinetic. The right ventricle is not well visualized.  - Cardiology consulted, . Likely demand ischemia.    optimizes medical therapy , on aspirin 81mg   in addition was not candidate for anticoagulation or cath due to acute bleed and dustin   - No acute intervention per cardiology now.    DM2 -c/w lantus 5U qHS while admitted to the hospital.   - ISS, FS controlled   - no further hypoglycemic events.    Acute blood loss anemia- s/p 3 units PRBC - counts now stable and resolved bleed   - c/w pantoprazole BID for GIB  - no interventions per GI  as bleeding self resolved.      on 2/18/22 pt is optimized for dc to rehab

## 2022-02-15 NOTE — PROVIDER CONTACT NOTE (OTHER) - SITUATION
bladder scan 592ml by ultrasound at 10:00, unable to insert straight cath, resistant met upon insertion.
pt retaining about 650cc of urine per bladder scanner, unable to straight cath pt per provider request. attemptedx3 with severe resistance

## 2022-02-15 NOTE — DISCHARGE NOTE PROVIDER - NSDCMRMEDTOKEN_GEN_ALL_CORE_FT
atorvastatin 20 mg oral tablet: 1 tab(s) orally once a day  carvedilol 6.25 mg oral tablet: 1 tab(s) orally 2 times a day  citalopram 10 mg oral tablet: 1 tab(s) orally once a day  metFORMIN 500 mg oral tablet: 1 tab(s) orally 2 times a day  omeprazole 20 mg oral delayed release capsule: 1 cap(s) orally once a day  senna 8.6 mg oral tablet: 2 tab(s) orally once a day (at bedtime)  Symbicort 80 mcg-4.5 mcg/inh inhalation aerosol: 2 puff(s) inhaled 2 times a day  Vitamin D3 50 mcg (2000 intl units) oral tablet: 1 tab(s) orally once a day   aspirin 81 mg oral delayed release tablet: 1 tab(s) orally once a day  atorvastatin 40 mg oral tablet: 1 tab(s) orally once a day  citalopram 10 mg oral tablet: 1 tab(s) orally once a day  insulin glargine: 5 unit(s) subcutaneous once a day (at bedtime)  insulin lispro 100 units/mL injectable solution: 2-5 unit(s) injectable 3 times a day before meals    melatonin 3 mg oral tablet: 1 tab(s) orally once a day (at bedtime), As needed, Insomnia  midodrine 5 mg oral tablet: 1 tab(s) orally 3 times a day  Hold &gt;130  omeprazole 20 mg oral delayed release capsule: 1 cap(s) orally once a day  polyethylene glycol 3350 oral powder for reconstitution: 17 gram(s) orally once a day  senna 8.6 mg oral tablet: 2 tab(s) orally once a day (at bedtime)  Symbicort 80 mcg-4.5 mcg/inh inhalation aerosol: 2 puff(s) inhaled 2 times a day  tamsulosin 0.4 mg oral capsule: 1 cap(s) orally once a day (at bedtime)  Vitamin D3 50 mcg (2000 intl units) oral tablet: 1 tab(s) orally once a day

## 2022-02-15 NOTE — PROCEDURE NOTE - NSPROCDETAILS_GEN_ALL_CORE
sterile technique, indwelling urinary device inserted
sterile technique, indwelling urinary device inserted

## 2022-02-16 LAB
ANION GAP SERPL CALC-SCNC: 10 MMOL/L — SIGNIFICANT CHANGE UP (ref 7–14)
BUN SERPL-MCNC: 11 MG/DL — SIGNIFICANT CHANGE UP (ref 7–23)
CALCIUM SERPL-MCNC: 8.7 MG/DL — SIGNIFICANT CHANGE UP (ref 8.4–10.5)
CHLORIDE SERPL-SCNC: 107 MMOL/L — SIGNIFICANT CHANGE UP (ref 98–107)
CO2 SERPL-SCNC: 20 MMOL/L — LOW (ref 22–31)
CREAT SERPL-MCNC: 1.03 MG/DL — SIGNIFICANT CHANGE UP (ref 0.5–1.3)
GLUCOSE BLDC GLUCOMTR-MCNC: 111 MG/DL — HIGH (ref 70–99)
GLUCOSE BLDC GLUCOMTR-MCNC: 134 MG/DL — HIGH (ref 70–99)
GLUCOSE BLDC GLUCOMTR-MCNC: 145 MG/DL — HIGH (ref 70–99)
GLUCOSE BLDC GLUCOMTR-MCNC: 170 MG/DL — HIGH (ref 70–99)
GLUCOSE SERPL-MCNC: 117 MG/DL — HIGH (ref 70–99)
HCT VFR BLD CALC: 28 % — LOW (ref 39–50)
HGB BLD-MCNC: 8.8 G/DL — LOW (ref 13–17)
MAGNESIUM SERPL-MCNC: 1.7 MG/DL — SIGNIFICANT CHANGE UP (ref 1.6–2.6)
MCHC RBC-ENTMCNC: 28.6 PG — SIGNIFICANT CHANGE UP (ref 27–34)
MCHC RBC-ENTMCNC: 31.4 GM/DL — LOW (ref 32–36)
MCV RBC AUTO: 90.9 FL — SIGNIFICANT CHANGE UP (ref 80–100)
NRBC # BLD: 0 /100 WBCS — SIGNIFICANT CHANGE UP
NRBC # FLD: 0 K/UL — SIGNIFICANT CHANGE UP
PHOSPHATE SERPL-MCNC: 3.2 MG/DL — SIGNIFICANT CHANGE UP (ref 2.5–4.5)
PLATELET # BLD AUTO: 102 K/UL — LOW (ref 150–400)
POTASSIUM SERPL-MCNC: 4.3 MMOL/L — SIGNIFICANT CHANGE UP (ref 3.5–5.3)
POTASSIUM SERPL-SCNC: 4.3 MMOL/L — SIGNIFICANT CHANGE UP (ref 3.5–5.3)
RBC # BLD: 3.08 M/UL — LOW (ref 4.2–5.8)
RBC # FLD: 16.8 % — HIGH (ref 10.3–14.5)
SODIUM SERPL-SCNC: 137 MMOL/L — SIGNIFICANT CHANGE UP (ref 135–145)
WBC # BLD: 4.13 K/UL — SIGNIFICANT CHANGE UP (ref 3.8–10.5)
WBC # FLD AUTO: 4.13 K/UL — SIGNIFICANT CHANGE UP (ref 3.8–10.5)

## 2022-02-16 PROCEDURE — 99232 SBSQ HOSP IP/OBS MODERATE 35: CPT

## 2022-02-16 RX ADMIN — BUDESONIDE AND FORMOTEROL FUMARATE DIHYDRATE 2 PUFF(S): 160; 4.5 AEROSOL RESPIRATORY (INHALATION) at 10:09

## 2022-02-16 RX ADMIN — INSULIN GLARGINE 5 UNIT(S): 100 INJECTION, SOLUTION SUBCUTANEOUS at 22:22

## 2022-02-16 RX ADMIN — SENNA PLUS 2 TABLET(S): 8.6 TABLET ORAL at 21:47

## 2022-02-16 RX ADMIN — BUDESONIDE AND FORMOTEROL FUMARATE DIHYDRATE 2 PUFF(S): 160; 4.5 AEROSOL RESPIRATORY (INHALATION) at 21:46

## 2022-02-16 RX ADMIN — ATORVASTATIN CALCIUM 40 MILLIGRAM(S): 80 TABLET, FILM COATED ORAL at 11:16

## 2022-02-16 RX ADMIN — HEPARIN SODIUM 5000 UNIT(S): 5000 INJECTION INTRAVENOUS; SUBCUTANEOUS at 18:00

## 2022-02-16 RX ADMIN — MIDODRINE HYDROCHLORIDE 5 MILLIGRAM(S): 2.5 TABLET ORAL at 14:32

## 2022-02-16 RX ADMIN — MIDODRINE HYDROCHLORIDE 5 MILLIGRAM(S): 2.5 TABLET ORAL at 21:47

## 2022-02-16 RX ADMIN — HEPARIN SODIUM 5000 UNIT(S): 5000 INJECTION INTRAVENOUS; SUBCUTANEOUS at 05:09

## 2022-02-16 RX ADMIN — PANTOPRAZOLE SODIUM 40 MILLIGRAM(S): 20 TABLET, DELAYED RELEASE ORAL at 05:08

## 2022-02-16 RX ADMIN — MIDODRINE HYDROCHLORIDE 5 MILLIGRAM(S): 2.5 TABLET ORAL at 05:08

## 2022-02-16 RX ADMIN — PANTOPRAZOLE SODIUM 40 MILLIGRAM(S): 20 TABLET, DELAYED RELEASE ORAL at 17:59

## 2022-02-16 RX ADMIN — TAMSULOSIN HYDROCHLORIDE 0.4 MILLIGRAM(S): 0.4 CAPSULE ORAL at 21:47

## 2022-02-16 RX ADMIN — Medication 2: at 12:17

## 2022-02-16 RX ADMIN — Medication 81 MILLIGRAM(S): at 11:16

## 2022-02-16 RX ADMIN — CITALOPRAM 10 MILLIGRAM(S): 10 TABLET, FILM COATED ORAL at 11:16

## 2022-02-16 NOTE — PROGRESS NOTE ADULT - SUBJECTIVE AND OBJECTIVE BOX
Patient is a 76y old  Male who presents with a chief complaint of Abdominal pain (15 Feb 2022 17:19)      SUBJECTIVE / OVERNIGHT EVENTS: no events     ROS:  No HA/DZ  No Vision changes   No CP, SOB  No N/V/D  No Edema  No Rash  NO weakness, numbness    MEDICATIONS  (STANDING):  aspirin enteric coated 81 milliGRAM(s) Oral daily  atorvastatin 40 milliGRAM(s) Oral daily  budesonide  80 MICROgram(s)/formoterol 4.5 MICROgram(s) Inhaler 2 Puff(s) Inhalation two times a day  citalopram 10 milliGRAM(s) Oral daily  dextrose 40% Gel 15 Gram(s) Oral once  dextrose 40% Gel 15 Gram(s) Oral once  dextrose 5%. 1000 milliLiter(s) (50 mL/Hr) IV Continuous <Continuous>  dextrose 50% Injectable 25 Gram(s) IV Push once  dextrose 50% Injectable 12.5 Gram(s) IV Push once  dextrose 50% Injectable 25 Gram(s) IV Push once  dextrose 50% Injectable 25 Gram(s) IV Push once  dextrose 50% Injectable 12.5 Gram(s) IV Push once  glucagon  Injectable 1 milliGRAM(s) IntraMuscular once  heparin   Injectable 5000 Unit(s) SubCutaneous every 12 hours  insulin glargine Injectable (LANTUS) 5 Unit(s) SubCutaneous at bedtime  insulin lispro (ADMELOG) corrective regimen sliding scale   SubCutaneous three times a day before meals  insulin lispro (ADMELOG) corrective regimen sliding scale   SubCutaneous at bedtime  lidocaine 2% Gel 1 Application(s) Topical once  midodrine 5 milliGRAM(s) Oral three times a day  pantoprazole    Tablet 40 milliGRAM(s) Oral two times a day  polyethylene glycol 3350 17 Gram(s) Oral daily  senna 2 Tablet(s) Oral at bedtime  tamsulosin 0.4 milliGRAM(s) Oral at bedtime    MEDICATIONS  (PRN):  melatonin 3 milliGRAM(s) Oral at bedtime PRN Insomnia      T(C): 36.5 (02-16-22 @ 05:05)  HR: 86 (02-16-22 @ 05:05)  BP: 113/64 (02-16-22 @ 05:05)  RR: 18 (02-16-22 @ 05:05)  SpO2: 97% (02-16-22 @ 05:05)  CAPILLARY BLOOD GLUCOSE      POCT Blood Glucose.: 111 mg/dL (16 Feb 2022 08:13)  POCT Blood Glucose.: 171 mg/dL (15 Feb 2022 21:58)  POCT Blood Glucose.: 224 mg/dL (15 Feb 2022 21:03)  POCT Blood Glucose.: 141 mg/dL (15 Feb 2022 17:02)  POCT Blood Glucose.: 136 mg/dL (15 Feb 2022 11:45)    I&O's Summary    15 Feb 2022 07:01  -  16 Feb 2022 07:00  --------------------------------------------------------  IN: 360 mL / OUT: 1950 mL / NET: -1590 mL        PHYSICAL EXAM:  GENERAL: NAD, well-developed, AOx3  HEAD:  Atraumatic, Normocephalic  EYES: EOMI, PERRL, conjunctiva and sclera clear  NECK: Supple, No JVD  CHEST/LUNG: Clear to auscultation bilaterally  HEART: Regular rate and rhythm; No murmurs, rubs, or gallops, No Edema  ABDOMEN: Soft, Nontender, Nondistended; Bowel sounds present  EXTREMITIES:  2+ Peripheral Pulses, No clubbing, cyanosis  PSYCH: No SI/HI  NEUROLOGY: non-focal  SKIN: No rashes or lesions    LABS:                        8.8    4.13  )-----------( 102      ( 16 Feb 2022 06:25 )             28.0     02-16    137  |  107  |  11  ----------------------------<  117<H>  4.3   |  20<L>  |  1.03    Ca    8.7      16 Feb 2022 06:25  Phos  3.2     02-16  Mg     1.70     02-16                    RADIOLOGY & ADDITIONAL TESTS:    Imaging Personally Reviewed:    Consultant(s) Notes Reviewed:      Care Discussed with Consultants/Other Providers:

## 2022-02-16 NOTE — PROGRESS NOTE ADULT - ASSESSMENT
76M with PMHx HFrEF (TTE 8/2011 showed moderate segmental LVSD), CAD s/p CABG, HTN, DM, MDD, hx of COPD, hx of CVA with hemiparesis 2011, GERD and GI bleed in the past.   Presented 2-2-2022  from [facility] with  due to coffee ground emesis, bp 70/40mmHg, AMS and  reported he had complained of  right lower quadrant sharp abd pain 1 week duration Admitted to the ICU due to shock  requiring levophed, anemic to Hb 5.6, with hyponatremia 128 , DUSTIN bun 165-3.25 , s/p 3u PRBC. Course complicated by worsening high AG acidosis and DKA, s/p insulin gtt. Pt now off pressors, on midodrine  transferred to medical  floors 2/7/2022, now still requiring midodrine to help BP.  Pt pending dc to STR.

## 2022-02-17 LAB
ANION GAP SERPL CALC-SCNC: 11 MMOL/L — SIGNIFICANT CHANGE UP (ref 7–14)
BUN SERPL-MCNC: 12 MG/DL — SIGNIFICANT CHANGE UP (ref 7–23)
CALCIUM SERPL-MCNC: 8.7 MG/DL — SIGNIFICANT CHANGE UP (ref 8.4–10.5)
CHLORIDE SERPL-SCNC: 105 MMOL/L — SIGNIFICANT CHANGE UP (ref 98–107)
CO2 SERPL-SCNC: 20 MMOL/L — LOW (ref 22–31)
CREAT SERPL-MCNC: 1.02 MG/DL — SIGNIFICANT CHANGE UP (ref 0.5–1.3)
GLUCOSE BLDC GLUCOMTR-MCNC: 106 MG/DL — HIGH (ref 70–99)
GLUCOSE BLDC GLUCOMTR-MCNC: 125 MG/DL — HIGH (ref 70–99)
GLUCOSE BLDC GLUCOMTR-MCNC: 161 MG/DL — HIGH (ref 70–99)
GLUCOSE BLDC GLUCOMTR-MCNC: 168 MG/DL — HIGH (ref 70–99)
GLUCOSE SERPL-MCNC: 100 MG/DL — HIGH (ref 70–99)
HCT VFR BLD CALC: 28.3 % — LOW (ref 39–50)
HGB BLD-MCNC: 9 G/DL — LOW (ref 13–17)
MAGNESIUM SERPL-MCNC: 1.7 MG/DL — SIGNIFICANT CHANGE UP (ref 1.6–2.6)
MCHC RBC-ENTMCNC: 29.2 PG — SIGNIFICANT CHANGE UP (ref 27–34)
MCHC RBC-ENTMCNC: 31.8 GM/DL — LOW (ref 32–36)
MCV RBC AUTO: 91.9 FL — SIGNIFICANT CHANGE UP (ref 80–100)
NRBC # BLD: 0 /100 WBCS — SIGNIFICANT CHANGE UP
NRBC # FLD: 0 K/UL — SIGNIFICANT CHANGE UP
PHOSPHATE SERPL-MCNC: 3.2 MG/DL — SIGNIFICANT CHANGE UP (ref 2.5–4.5)
PLATELET # BLD AUTO: 96 K/UL — LOW (ref 150–400)
POTASSIUM SERPL-MCNC: 4.1 MMOL/L — SIGNIFICANT CHANGE UP (ref 3.5–5.3)
POTASSIUM SERPL-SCNC: 4.1 MMOL/L — SIGNIFICANT CHANGE UP (ref 3.5–5.3)
RBC # BLD: 3.08 M/UL — LOW (ref 4.2–5.8)
RBC # FLD: 16.8 % — HIGH (ref 10.3–14.5)
SODIUM SERPL-SCNC: 136 MMOL/L — SIGNIFICANT CHANGE UP (ref 135–145)
WBC # BLD: 3.89 K/UL — SIGNIFICANT CHANGE UP (ref 3.8–10.5)
WBC # FLD AUTO: 3.89 K/UL — SIGNIFICANT CHANGE UP (ref 3.8–10.5)

## 2022-02-17 PROCEDURE — 99232 SBSQ HOSP IP/OBS MODERATE 35: CPT

## 2022-02-17 RX ADMIN — BUDESONIDE AND FORMOTEROL FUMARATE DIHYDRATE 2 PUFF(S): 160; 4.5 AEROSOL RESPIRATORY (INHALATION) at 21:28

## 2022-02-17 RX ADMIN — MIDODRINE HYDROCHLORIDE 5 MILLIGRAM(S): 2.5 TABLET ORAL at 15:28

## 2022-02-17 RX ADMIN — PANTOPRAZOLE SODIUM 40 MILLIGRAM(S): 20 TABLET, DELAYED RELEASE ORAL at 05:37

## 2022-02-17 RX ADMIN — MIDODRINE HYDROCHLORIDE 5 MILLIGRAM(S): 2.5 TABLET ORAL at 21:30

## 2022-02-17 RX ADMIN — SENNA PLUS 2 TABLET(S): 8.6 TABLET ORAL at 21:28

## 2022-02-17 RX ADMIN — Medication 81 MILLIGRAM(S): at 11:07

## 2022-02-17 RX ADMIN — Medication 2: at 17:28

## 2022-02-17 RX ADMIN — HEPARIN SODIUM 5000 UNIT(S): 5000 INJECTION INTRAVENOUS; SUBCUTANEOUS at 05:37

## 2022-02-17 RX ADMIN — MIDODRINE HYDROCHLORIDE 5 MILLIGRAM(S): 2.5 TABLET ORAL at 05:37

## 2022-02-17 RX ADMIN — TAMSULOSIN HYDROCHLORIDE 0.4 MILLIGRAM(S): 0.4 CAPSULE ORAL at 21:30

## 2022-02-17 RX ADMIN — HEPARIN SODIUM 5000 UNIT(S): 5000 INJECTION INTRAVENOUS; SUBCUTANEOUS at 17:30

## 2022-02-17 RX ADMIN — PANTOPRAZOLE SODIUM 40 MILLIGRAM(S): 20 TABLET, DELAYED RELEASE ORAL at 17:29

## 2022-02-17 RX ADMIN — CITALOPRAM 10 MILLIGRAM(S): 10 TABLET, FILM COATED ORAL at 11:07

## 2022-02-17 RX ADMIN — ATORVASTATIN CALCIUM 40 MILLIGRAM(S): 80 TABLET, FILM COATED ORAL at 11:06

## 2022-02-17 RX ADMIN — BUDESONIDE AND FORMOTEROL FUMARATE DIHYDRATE 2 PUFF(S): 160; 4.5 AEROSOL RESPIRATORY (INHALATION) at 11:06

## 2022-02-17 RX ADMIN — INSULIN GLARGINE 5 UNIT(S): 100 INJECTION, SOLUTION SUBCUTANEOUS at 22:25

## 2022-02-17 NOTE — PROGRESS NOTE ADULT - ASSESSMENT
76M with PMHx HFrEF (TTE 8/2011 showed moderate segmental LVSD), CAD s/p CABG, HTN, DM, MDD, hx of COPD, hx of CVA with hemiparesis 2011, GERD and GI bleed in the past.   Presented 2-2-2022  from [facility] with  due to coffee ground emesis, bp 70/40mmHg, AMS and  reported he had complained of  right lower quadrant sharp abd pain 1 week duration Admitted to the ICU due to shock  requiring levophed, anemic to Hb 5.6, with hyponatremia 128 , DUSTIN bun 165-3.25 , s/p 3u PRBC. Course complicated by worsening high AG acidosis and DKA, s/p insulin gtt. Pt now off pressors, on midodrine  transferred to medical  floors 2/7/2022, now still requiring midodrine to help BP.  Pt pending dc to STR.

## 2022-02-17 NOTE — PROGRESS NOTE ADULT - SUBJECTIVE AND OBJECTIVE BOX
Patient is a 76y old  Male who presents with a chief complaint of Abdominal pain (16 Feb 2022 10:04)      SUBJECTIVE / OVERNIGHT EVENTS: no events - feels well, no overt bleed     ROS:  No HA/DZ  No Vision changes   No CP, SOB  No N/V/D  No Edema  No Rash  NO weakness, numbness    MEDICATIONS  (STANDING):  aspirin enteric coated 81 milliGRAM(s) Oral daily  atorvastatin 40 milliGRAM(s) Oral daily  budesonide  80 MICROgram(s)/formoterol 4.5 MICROgram(s) Inhaler 2 Puff(s) Inhalation two times a day  citalopram 10 milliGRAM(s) Oral daily  dextrose 40% Gel 15 Gram(s) Oral once  dextrose 40% Gel 15 Gram(s) Oral once  dextrose 5%. 1000 milliLiter(s) (50 mL/Hr) IV Continuous <Continuous>  dextrose 50% Injectable 25 Gram(s) IV Push once  dextrose 50% Injectable 12.5 Gram(s) IV Push once  dextrose 50% Injectable 25 Gram(s) IV Push once  dextrose 50% Injectable 25 Gram(s) IV Push once  dextrose 50% Injectable 12.5 Gram(s) IV Push once  glucagon  Injectable 1 milliGRAM(s) IntraMuscular once  heparin   Injectable 5000 Unit(s) SubCutaneous every 12 hours  insulin glargine Injectable (LANTUS) 5 Unit(s) SubCutaneous at bedtime  insulin lispro (ADMELOG) corrective regimen sliding scale   SubCutaneous three times a day before meals  insulin lispro (ADMELOG) corrective regimen sliding scale   SubCutaneous at bedtime  midodrine 5 milliGRAM(s) Oral three times a day  pantoprazole    Tablet 40 milliGRAM(s) Oral two times a day  polyethylene glycol 3350 17 Gram(s) Oral daily  senna 2 Tablet(s) Oral at bedtime  tamsulosin 0.4 milliGRAM(s) Oral at bedtime    MEDICATIONS  (PRN):  melatonin 3 milliGRAM(s) Oral at bedtime PRN Insomnia      T(C): 36.6 (02-17-22 @ 05:43)  HR: 85 (02-17-22 @ 05:43)  BP: 110/63 (02-17-22 @ 05:43)  RR: 17 (02-17-22 @ 05:43)  SpO2: 99% (02-17-22 @ 05:43)  CAPILLARY BLOOD GLUCOSE      POCT Blood Glucose.: 106 mg/dL (17 Feb 2022 08:31)  POCT Blood Glucose.: 134 mg/dL (16 Feb 2022 22:13)  POCT Blood Glucose.: 145 mg/dL (16 Feb 2022 17:12)  POCT Blood Glucose.: 170 mg/dL (16 Feb 2022 11:51)    I&O's Summary      PHYSICAL EXAM:  GENERAL: NAD, well-developed, AOx3  HEAD:  Atraumatic, Normocephalic  EYES: EOMI, PERRL, conjunctiva and sclera clear  NECK: Supple, No JVD  CHEST/LUNG: Clear to auscultation bilaterally  HEART: Regular rate and rhythm; No murmurs, rubs, or gallops, No Edema  ABDOMEN: Soft, Nontender, Nondistended; Bowel sounds present  EXTREMITIES:  2+ Peripheral Pulses, No clubbing, cyanosis  PSYCH: No SI/HI  NEUROLOGY: non-focal  SKIN: No rashes or lesions    LABS:                        9.0    3.89  )-----------( 96       ( 17 Feb 2022 07:02 )             28.3     02-17    136  |  105  |  12  ----------------------------<  100<H>  4.1   |  20<L>  |  1.02    Ca    8.7      17 Feb 2022 07:02  Phos  3.2     02-17  Mg     1.70     02-17                    RADIOLOGY & ADDITIONAL TESTS:    Imaging Personally Reviewed:    Consultant(s) Notes Reviewed:      Care Discussed with Consultants/Other Providers:

## 2022-02-18 ENCOUNTER — TRANSCRIPTION ENCOUNTER (OUTPATIENT)
Age: 77
End: 2022-02-18

## 2022-02-18 VITALS
DIASTOLIC BLOOD PRESSURE: 58 MMHG | HEART RATE: 72 BPM | OXYGEN SATURATION: 100 % | TEMPERATURE: 98 F | RESPIRATION RATE: 17 BRPM | SYSTOLIC BLOOD PRESSURE: 118 MMHG

## 2022-02-18 LAB
ANION GAP SERPL CALC-SCNC: 10 MMOL/L — SIGNIFICANT CHANGE UP (ref 7–14)
BUN SERPL-MCNC: 12 MG/DL — SIGNIFICANT CHANGE UP (ref 7–23)
CALCIUM SERPL-MCNC: 8.8 MG/DL — SIGNIFICANT CHANGE UP (ref 8.4–10.5)
CHLORIDE SERPL-SCNC: 106 MMOL/L — SIGNIFICANT CHANGE UP (ref 98–107)
CO2 SERPL-SCNC: 21 MMOL/L — LOW (ref 22–31)
CREAT SERPL-MCNC: 1 MG/DL — SIGNIFICANT CHANGE UP (ref 0.5–1.3)
GLUCOSE BLDC GLUCOMTR-MCNC: 111 MG/DL — HIGH (ref 70–99)
GLUCOSE BLDC GLUCOMTR-MCNC: 148 MG/DL — HIGH (ref 70–99)
GLUCOSE SERPL-MCNC: 103 MG/DL — HIGH (ref 70–99)
HCT VFR BLD CALC: 29.2 % — LOW (ref 39–50)
HGB BLD-MCNC: 9.1 G/DL — LOW (ref 13–17)
MAGNESIUM SERPL-MCNC: 1.6 MG/DL — SIGNIFICANT CHANGE UP (ref 1.6–2.6)
MCHC RBC-ENTMCNC: 28.9 PG — SIGNIFICANT CHANGE UP (ref 27–34)
MCHC RBC-ENTMCNC: 31.2 GM/DL — LOW (ref 32–36)
MCV RBC AUTO: 92.7 FL — SIGNIFICANT CHANGE UP (ref 80–100)
NRBC # BLD: 0 /100 WBCS — SIGNIFICANT CHANGE UP
NRBC # FLD: 0 K/UL — SIGNIFICANT CHANGE UP
PHOSPHATE SERPL-MCNC: 3.2 MG/DL — SIGNIFICANT CHANGE UP (ref 2.5–4.5)
PLATELET # BLD AUTO: 90 K/UL — LOW (ref 150–400)
POTASSIUM SERPL-MCNC: 4.2 MMOL/L — SIGNIFICANT CHANGE UP (ref 3.5–5.3)
POTASSIUM SERPL-SCNC: 4.2 MMOL/L — SIGNIFICANT CHANGE UP (ref 3.5–5.3)
RBC # BLD: 3.15 M/UL — LOW (ref 4.2–5.8)
RBC # FLD: 16.5 % — HIGH (ref 10.3–14.5)
SARS-COV-2 RNA SPEC QL NAA+PROBE: SIGNIFICANT CHANGE UP
SODIUM SERPL-SCNC: 137 MMOL/L — SIGNIFICANT CHANGE UP (ref 135–145)
WBC # BLD: 3.35 K/UL — LOW (ref 3.8–10.5)
WBC # FLD AUTO: 3.35 K/UL — LOW (ref 3.8–10.5)

## 2022-02-18 PROCEDURE — 99239 HOSP IP/OBS DSCHRG MGMT >30: CPT

## 2022-02-18 RX ORDER — ATORVASTATIN CALCIUM 80 MG/1
1 TABLET, FILM COATED ORAL
Qty: 0 | Refills: 0 | DISCHARGE

## 2022-02-18 RX ORDER — CARVEDILOL PHOSPHATE 80 MG/1
1 CAPSULE, EXTENDED RELEASE ORAL
Qty: 0 | Refills: 0 | DISCHARGE

## 2022-02-18 RX ORDER — LANOLIN ALCOHOL/MO/W.PET/CERES
1 CREAM (GRAM) TOPICAL
Qty: 0 | Refills: 0 | DISCHARGE
Start: 2022-02-18

## 2022-02-18 RX ORDER — TAMSULOSIN HYDROCHLORIDE 0.4 MG/1
1 CAPSULE ORAL
Qty: 0 | Refills: 0 | DISCHARGE
Start: 2022-02-18

## 2022-02-18 RX ORDER — METFORMIN HYDROCHLORIDE 850 MG/1
1 TABLET ORAL
Qty: 0 | Refills: 0 | DISCHARGE

## 2022-02-18 RX ORDER — INSULIN LISPRO 100/ML
2 VIAL (ML) SUBCUTANEOUS
Qty: 0 | Refills: 0 | DISCHARGE
Start: 2022-02-18

## 2022-02-18 RX ORDER — POLYETHYLENE GLYCOL 3350 17 G/17G
17 POWDER, FOR SOLUTION ORAL
Qty: 0 | Refills: 0 | DISCHARGE
Start: 2022-02-18

## 2022-02-18 RX ORDER — MIDODRINE HYDROCHLORIDE 2.5 MG/1
1 TABLET ORAL
Qty: 0 | Refills: 0 | DISCHARGE
Start: 2022-02-18

## 2022-02-18 RX ORDER — ASPIRIN/CALCIUM CARB/MAGNESIUM 324 MG
1 TABLET ORAL
Qty: 0 | Refills: 0 | DISCHARGE
Start: 2022-02-18

## 2022-02-18 RX ORDER — ATORVASTATIN CALCIUM 80 MG/1
1 TABLET, FILM COATED ORAL
Qty: 0 | Refills: 0 | DISCHARGE
Start: 2022-02-18

## 2022-02-18 RX ORDER — INSULIN GLARGINE 100 [IU]/ML
5 INJECTION, SOLUTION SUBCUTANEOUS
Qty: 0 | Refills: 0 | DISCHARGE
Start: 2022-02-18

## 2022-02-18 RX ADMIN — CITALOPRAM 10 MILLIGRAM(S): 10 TABLET, FILM COATED ORAL at 13:29

## 2022-02-18 RX ADMIN — MIDODRINE HYDROCHLORIDE 5 MILLIGRAM(S): 2.5 TABLET ORAL at 06:02

## 2022-02-18 RX ADMIN — BUDESONIDE AND FORMOTEROL FUMARATE DIHYDRATE 2 PUFF(S): 160; 4.5 AEROSOL RESPIRATORY (INHALATION) at 10:10

## 2022-02-18 RX ADMIN — HEPARIN SODIUM 5000 UNIT(S): 5000 INJECTION INTRAVENOUS; SUBCUTANEOUS at 06:02

## 2022-02-18 RX ADMIN — MIDODRINE HYDROCHLORIDE 5 MILLIGRAM(S): 2.5 TABLET ORAL at 13:29

## 2022-02-18 RX ADMIN — POLYETHYLENE GLYCOL 3350 17 GRAM(S): 17 POWDER, FOR SOLUTION ORAL at 13:28

## 2022-02-18 RX ADMIN — Medication 81 MILLIGRAM(S): at 13:28

## 2022-02-18 RX ADMIN — PANTOPRAZOLE SODIUM 40 MILLIGRAM(S): 20 TABLET, DELAYED RELEASE ORAL at 06:02

## 2022-02-18 RX ADMIN — ATORVASTATIN CALCIUM 40 MILLIGRAM(S): 80 TABLET, FILM COATED ORAL at 13:29

## 2022-02-18 NOTE — PROGRESS NOTE ADULT - PROBLEM SELECTOR PROBLEM 3
Acute retention of urine
DM2 (diabetes mellitus, type 2)
Acute retention of urine
DM2 (diabetes mellitus, type 2)
DM2 (diabetes mellitus, type 2)
Hypophosphatemia
DM2 (diabetes mellitus, type 2)
Acute retention of urine

## 2022-02-18 NOTE — PROGRESS NOTE ADULT - PROBLEM SELECTOR PLAN 5
c/w lantus 5U qHS  - ISS, FS controlled   - no further hypoglycemic events
c/w lantus 5U qHS  - ISS
c/w lantus 5U qHS  - ISS
c/w lantus 5U qHS  - ISS, FS controlled   - no further hypoglycemic events

## 2022-02-18 NOTE — DISCHARGE NOTE NURSING/CASE MANAGEMENT/SOCIAL WORK - PATIENT PORTAL LINK FT
You can access the FollowMyHealth Patient Portal offered by [facility] by registering at the following website: [email or web address]. By joining [email or web address] FollowMyHealth portal, you will also be able to view your health information using other applications (apps) compatible with our system.

## 2022-02-18 NOTE — PROGRESS NOTE ADULT - PROBLEM SELECTOR PROBLEM 7
Need for prophylactic measure
[phone]

## 2022-02-18 NOTE — PROGRESS NOTE ADULT - PROBLEM SELECTOR PROBLEM 1
DUSTIN (acute kidney injury)
Sepsis associated hypotension
DUSTIN (acute kidney injury)
Sepsis associated hypotension
Sepsis associated hypotension
DUSTIN (acute kidney injury)
Sepsis associated hypotension
Sepsis associated hypotension

## 2022-02-18 NOTE — PROGRESS NOTE ADULT - PROBLEM SELECTOR PROBLEM 4
Acute blood loss anemia
Chronic HFrEF (heart failure with reduced ejection fraction)
Acute blood loss anemia
Acute blood loss anemia
Chronic HFrEF (heart failure with reduced ejection fraction)
Chronic HFrEF (heart failure with reduced ejection fraction)
Acute blood loss anemia
Chronic HFrEF (heart failure with reduced ejection fraction)

## 2022-02-18 NOTE — PROGRESS NOTE ADULT - PROBLEM SELECTOR PLAN 7
DVT ppx: SQH and SCDs  Diet: Pureed  Dispo:  PT - rehab  medically stable P ALFRED
DVT ppx: SQH and SCDs  Diet: Pureed  Dispo:  PT - rehab  medically stable P [facility]FRED
DVT ppx: SQH and SCDs  Diet: Pureed  Dispo:  pending PT    Attempted to call wife to update however did not .
DVT ppx: SQH and SCDs  Diet: Pureed  Dispo:  PT - rehab  medically stable P ALFRED - has bed, transport arriving at 12 pm today   Time spent 35 min  dw ARISTEO, CM, ACP
DVT ppx: SQH and SCDs  Diet: Pureed  Dispo:  PT - rehab  medically stable P ALFRED  Time spent 35 min  dw SW, CM, ACP
DVT ppx: SQH and SCDs  Diet: Pureed  Dispo:  PT - rehab  medically stable P ALFRED
DVT ppx: SQH and SCDs  Diet: Pureed  Dispo:  pending PT

## 2022-02-18 NOTE — DISCHARGE NOTE NURSING/CASE MANAGEMENT/SOCIAL WORK - NSDCPEFALRISK_GEN_ALL_CORE
For information on Fall & Injury Prevention, visit: [email or web address] OR  [email or web address] OR  https://www.cdc.gov/steadi/patient.html

## 2022-02-18 NOTE — PROGRESS NOTE ADULT - PROVIDER SPECIALTY LIST ADULT
Hospitalist
Internal Medicine
MICU
Nephrology
Gastroenterology
MICU
Gastroenterology
Gastroenterology
Critical Care
MICU
MICU
Nephrology
Hospitalist
Internal Medicine
Hospitalist
Hospitalist

## 2022-02-18 NOTE — PROGRESS NOTE ADULT - PROBLEM SELECTOR PLAN 4
#HFrEF, CAD, troponinemia (1320--> 1192)  likely secondary to demand ischemia from hypotension and marcus   . Last documented LVEF in [employer] was 27%  - TTE 2/3:  unable to assess EF/LV function.  moderate mitral stenosis. Calcified trileaflet aortic valve with mildly decreased, Grossly the anterolateral and possibly the mid to distal segments of the anterior wall are hypokinetic. The right ventricle is not well visualized.  - Cardiology consulted, . Likely demand ischemia.    optimizes medical therapy , on aspirin 81mg   in addition was not candidate for anticoagulation or cath due to acute bleed and marcus     - No acute intervention per cardiology now
monitor hemoglobin
monitor hemoglobin
#HFrEF, CAD, troponinemia (1320--> 1192)  likely secondary to demand ischemia from hypotension and marcus   . Last documented LVEF in [employer] was 27%  - TTE 2/3:  unable to assess EF/LV function.  moderate mitral stenosis. Calcified trileaflet aortic valve with mildly decreased, Grossly the anterolateral and possibly the mid to distal segments of the anterior wall are hypokinetic. The right ventricle is not well visualized.  - Cardiology consulted, . Likely demand ischemia.    optimizes medical therapy , on aspirin 81mg   in addition was not candidate for anticoagulation or cath due to acute bleed and marcus     - No acute intervention per cardiology now
#HFrEF, CAD, troponinemia (1320--> 1192)  likely secondary to demand ischemia from hypotension and marcus   . Last documented LVEF in [employer] was 27%  - TTE 2/3:  unable to assess EF/LV function.  moderate mitral stenosis. Calcified trileaflet aortic valve with mildly decreased, Grossly the anterolateral and possibly the mid to distal segments of the anterior wall are hypokinetic. The right ventricle is not well visualized.  - Cardiology consulted, . Likely demand ischemia.    optimizes medical therapy , on aspirin 81mg   in addition was not candidate for anticoagulation or cath due to acute bleed and marcus     - No acute intervention per cardiology now
monitor hemoglobin
TTE 2/3:  unable to assess EF/LV function.  moderate mitral stenosis. Calcified trileaflet aortic valve with mildly decreased, Grossly the anterolateral and possibly the mid to distal segments of the anterior wall are hypokinetic. The right ventricle is not well visualized.  - no acute interventions at this time.  - c/w atorvastatin  - c/w ASA
monitor hemoglobin
#HFrEF, CAD, troponinemia (1320--> 1192)  likely secondary to demand ischemia from hypotension and marcus   . Last documented LVEF in [employer] was 27%  - TTE 2/3:  unable to assess EF/LV function.  moderate mitral stenosis. Calcified trileaflet aortic valve with mildly decreased, Grossly the anterolateral and possibly the mid to distal segments of the anterior wall are hypokinetic. The right ventricle is not well visualized.  - Cardiology consulted, . Likely demand ischemia.    optimizes medical therapy , on aspirin 81mg   in addition was not candidate for anticoagulation or cath due to acute bleed and marcus     - No acute intervention per cardiology now
#HFrEF, CAD, troponinemia (1320--> 1192)  likely secondary to demand ischemia from hypotension and marcus   . Last documented LVEF in [employer] was 27%  - TTE 2/3:  unable to assess EF/LV function.  moderate mitral stenosis. Calcified trileaflet aortic valve with mildly decreased, Grossly the anterolateral and possibly the mid to distal segments of the anterior wall are hypokinetic. The right ventricle is not well visualized.  - Cardiology consulted, . Likely demand ischemia.    optimizes medical therapy , on aspirin 81mg   in addition was not candidate for anticoagulation or cath due to acute bleed and marcus     - No acute intervention per cardiology now
TTE 2/3:  unable to assess EF/LV function.  moderate mitral stenosis. Calcified trileaflet aortic valve with mildly decreased, Grossly the anterolateral and possibly the mid to distal segments of the anterior wall are hypokinetic. The right ventricle is not well visualized.  - no acute interventions at this time.  - c/w atorvastatin  - c/w ASA

## 2022-02-18 NOTE — PROGRESS NOTE ADULT - PROBLEM SELECTOR PLAN 3
monitor glucose fingerstick
Phos 2.3  Replace phos      Will sign off. Please call us if any questions.    If you have any questions, please feel free to contact me  Anjelica Paulino  Nephrology Fellow  Pager [facility]: [phone]/ [facility]: [phone]    (After 5 pm or on weekends please page the on-call fellow, can check [email or web address] for schedule. Login is michell scott, schedule under [facility] medicine, psych, derm)
pt now with Hung, after failed TOV attempt   - c/w Flomax  - attempting TOV - if fails again, Hung to stay and out-pt f/u   - no infx
monitor glucose fingerstick
monitor glucose fingerstick
pt now with isabel, will continue x 7 days  - c/w flomax  - will attempt TOV on 2/15
monitor glucose fingerstick
pt now with hall, will continue x 7 days  - c/w flomax
pt now with Abhilash, after failed TOV attempt x 2  - c/w Flomax  - out-pt f.u
pt now with Hung, after failed TOV attempt   - c/w Flomax  - will attempt TOV agaibn today  - no infx
pt now with Abhilash, after failed TOV attempt x 2  - c/w Flomax  - out-pt f.u
pt now with Abhilash, after failed TOV attempt x 2  - c/w Flomax  - out-pt f.u

## 2022-02-18 NOTE — PROGRESS NOTE ADULT - ASSESSMENT
76M with PMHx HFrEF (TTE 8/2011 showed moderate segmental LVSD), CAD s/p CABG, HTN, DM, MDD, hx of COPD, hx of CVA with hemiparesis 2011, GERD and GI bleed in the past.   Presented 2-2-2022  from [facility] with  due to coffee ground emesis, bp 70/40mmHg, AMS and  reported he had complained of  right lower quadrant sharp abd pain 1 week duration Admitted to the ICU due to shock  requiring levophed, anemic to Hb 5.6, with hyponatremia 128 , DUSTIN bun 165-3.25 , s/p 3u PRBC. Course complicated by worsening high AG acidosis and DKA, s/p insulin gtt. Pt now off pressors, on midodrine  transferred to medical  floors 2/7/2022, now still requiring midodrine to help BP.  Pt pending dc to STR.

## 2022-02-18 NOTE — PROGRESS NOTE ADULT - PROBLEM SELECTOR PROBLEM 2
DUSTIN (acute kidney injury)
Metabolic acidosis
Acute retention of urine
Acute retention of urine
Respiratory alkalosis
DUSTIN (acute kidney injury)
DUSTIN (acute kidney injury)
Acute retention of urine
Acute retention of urine
DUSTIN (acute kidney injury)

## 2022-02-18 NOTE — PROGRESS NOTE ADULT - NUTRITIONAL ASSESSMENT
This patient has been assessed with a concern for Malnutrition and has been determined to have a diagnosis/diagnoses of Mild protein-calorie malnutrition.    This patient is being managed with:   Diet Clear Liquid-  Consistent Carbohydrate {No Snacks} (CSTCHO)  Low Sodium  Entered: Feb 5 2022  8:23AM    
This patient has been assessed with a concern for Malnutrition and has been determined to have a diagnosis/diagnoses of Mild protein-calorie malnutrition.    This patient is being managed with:   Diet Pureed-  Consistent Carbohydrate {No Snacks} (CSTCHO)  DASH/TLC {Sodium & Cholesterol Restricted} (DASH)  Mildly Thick Liquids (MILDTHICKLIQS)  Entered: Feb 8 2022  4:04PM    
This patient has been assessed with a concern for Malnutrition and has been determined to have a diagnosis/diagnoses of Mild protein-calorie malnutrition.    This patient is being managed with:   Diet Pureed-  Entered: Feb 7 2022  2:04PM    
This patient has been assessed with a concern for Malnutrition and has been determined to have a diagnosis/diagnoses of Mild protein-calorie malnutrition.    This patient is being managed with:   Diet Pureed-  Consistent Carbohydrate {No Snacks} (CSTCHO)  DASH/TLC {Sodium & Cholesterol Restricted} (DASH)  Mildly Thick Liquids (MILDTHICKLIQS)  Entered: Feb 8 2022  4:04PM    

## 2022-02-18 NOTE — PROGRESS NOTE ADULT - SUBJECTIVE AND OBJECTIVE BOX
Patient is a 76y old  Male who presents with a chief complaint of Abdominal pain (17 Feb 2022 09:44)      SUBJECTIVE / OVERNIGHT EVENTS: no events, no bleed, feels well     ROS:  No HA/DZ  No Vision changes   No CP, SOB  No N/V/D  No Edema  No Rash  NO weakness, numbness    MEDICATIONS  (STANDING):  aspirin enteric coated 81 milliGRAM(s) Oral daily  atorvastatin 40 milliGRAM(s) Oral daily  budesonide  80 MICROgram(s)/formoterol 4.5 MICROgram(s) Inhaler 2 Puff(s) Inhalation two times a day  citalopram 10 milliGRAM(s) Oral daily  dextrose 40% Gel 15 Gram(s) Oral once  dextrose 40% Gel 15 Gram(s) Oral once  dextrose 5%. 1000 milliLiter(s) (50 mL/Hr) IV Continuous <Continuous>  dextrose 50% Injectable 25 Gram(s) IV Push once  dextrose 50% Injectable 12.5 Gram(s) IV Push once  dextrose 50% Injectable 25 Gram(s) IV Push once  dextrose 50% Injectable 25 Gram(s) IV Push once  dextrose 50% Injectable 12.5 Gram(s) IV Push once  glucagon  Injectable 1 milliGRAM(s) IntraMuscular once  heparin   Injectable 5000 Unit(s) SubCutaneous every 12 hours  insulin glargine Injectable (LANTUS) 5 Unit(s) SubCutaneous at bedtime  insulin lispro (ADMELOG) corrective regimen sliding scale   SubCutaneous three times a day before meals  insulin lispro (ADMELOG) corrective regimen sliding scale   SubCutaneous at bedtime  midodrine 5 milliGRAM(s) Oral three times a day  pantoprazole    Tablet 40 milliGRAM(s) Oral two times a day  polyethylene glycol 3350 17 Gram(s) Oral daily  senna 2 Tablet(s) Oral at bedtime  tamsulosin 0.4 milliGRAM(s) Oral at bedtime    MEDICATIONS  (PRN):  melatonin 3 milliGRAM(s) Oral at bedtime PRN Insomnia      T(C): 36.6 (02-18-22 @ 06:09)  HR: 84 (02-18-22 @ 06:09)  BP: 118/62 (02-18-22 @ 06:09)  RR: 17 (02-18-22 @ 06:09)  SpO2: 100% (02-18-22 @ 06:09)  CAPILLARY BLOOD GLUCOSE      POCT Blood Glucose.: 111 mg/dL (18 Feb 2022 08:32)  POCT Blood Glucose.: 168 mg/dL (17 Feb 2022 22:17)  POCT Blood Glucose.: 161 mg/dL (17 Feb 2022 17:15)  POCT Blood Glucose.: 125 mg/dL (17 Feb 2022 12:04)    I&O's Summary    17 Feb 2022 07:01  -  18 Feb 2022 07:00  --------------------------------------------------------  IN: 0 mL / OUT: 500 mL / NET: -500 mL    18 Feb 2022 07:01  -  18 Feb 2022 09:51  --------------------------------------------------------  IN: 0 mL / OUT: 800 mL / NET: -800 mL        PHYSICAL EXAM:  GENERAL: NAD, well-developed, AOx3  HEAD:  Atraumatic, Normocephalic  EYES: EOMI, PERRL, conjunctiva and sclera clear  NECK: Supple, No JVD  CHEST/LUNG: Clear to auscultation bilaterally  HEART: Regular rate and rhythm; No murmurs, rubs, or gallops, No Edema  ABDOMEN: Soft, Nontender, Nondistended; Bowel sounds present  EXTREMITIES:  2+ Peripheral Pulses, No clubbing, cyanosis  PSYCH: No SI/HI  NEUROLOGY: non-focal  SKIN: No rashes or lesions    LABS:                        9.1    3.35  )-----------( 90       ( 18 Feb 2022 08:27 )             29.2     02-18    137  |  106  |  12  ----------------------------<  103<H>  4.2   |  21<L>  |  1.00    Ca    8.8      18 Feb 2022 08:27  Phos  3.2     02-18  Mg     1.60     02-18                    RADIOLOGY & ADDITIONAL TESTS:    Imaging Personally Reviewed:    Consultant(s) Notes Reviewed:      Care Discussed with Consultants/Other Providers:

## 2022-02-18 NOTE — PROGRESS NOTE ADULT - PROBLEM SELECTOR PLAN 6
monitor hemoglobin, now resolved, no need for transfusion at this time.  - c/w pantoprazole BID for GIB  - CBC daily
- s/p 3 units PRBC - counts now stable and resolved bleed   - c/w pantoprazole BID for GIB  - CBC daily  - no interventions per GI  as bleeding self resolved
monitor hemoglobin, now resolved, no need for transfusion at this time.  - c/w pantoprazole BID for GIB  - CBC daily

## 2022-05-02 ENCOUNTER — NON-APPOINTMENT (OUTPATIENT)
Age: 77
End: 2022-05-02

## 2022-05-02 ENCOUNTER — APPOINTMENT (OUTPATIENT)
Dept: RADIOLOGY | Facility: HOSPITAL | Age: 77
End: 2022-05-02

## 2022-05-02 ENCOUNTER — APPOINTMENT (OUTPATIENT)
Dept: SPEECH THERAPY | Facility: HOSPITAL | Age: 77
End: 2022-05-02

## 2022-05-02 ENCOUNTER — OUTPATIENT (OUTPATIENT)
Dept: OUTPATIENT SERVICES | Facility: HOSPITAL | Age: 77
LOS: 1 days | End: 2022-05-02
Payer: MEDICARE

## 2022-05-02 ENCOUNTER — OUTPATIENT (OUTPATIENT)
Dept: OUTPATIENT SERVICES | Facility: HOSPITAL | Age: 77
LOS: 1 days | Discharge: HOME | End: 2022-05-02

## 2022-05-02 DIAGNOSIS — R13.10 DYSPHAGIA, UNSPECIFIED: ICD-10-CM

## 2022-05-02 PROCEDURE — 74230 X-RAY XM SWLNG FUNCJ C+: CPT | Mod: 26

## 2022-05-04 DIAGNOSIS — R13.12 DYSPHAGIA, OROPHARYNGEAL PHASE: ICD-10-CM

## 2023-12-26 NOTE — PROGRESS NOTE ADULT - CRITICAL CARE SERVICES PROVIDED
Critical care services provided
show

## 2025-05-20 ENCOUNTER — APPOINTMENT (OUTPATIENT)
Dept: RADIOLOGY | Facility: HOSPITAL | Age: 80
End: 2025-05-20

## 2025-05-20 ENCOUNTER — APPOINTMENT (OUTPATIENT)
Dept: SPEECH THERAPY | Facility: HOSPITAL | Age: 80
End: 2025-05-20